# Patient Record
Sex: FEMALE | Race: WHITE | Employment: UNEMPLOYED
[De-identification: names, ages, dates, MRNs, and addresses within clinical notes are randomized per-mention and may not be internally consistent; named-entity substitution may affect disease eponyms.]

---

## 2017-02-13 ENCOUNTER — TELEPHONE (OUTPATIENT)
Dept: FAMILY MEDICINE CLINIC | Facility: CLINIC | Age: 57
End: 2017-02-13

## 2017-02-13 DIAGNOSIS — J45.21 MILD INTERMITTENT ASTHMA WITH ACUTE EXACERBATION: Primary | ICD-10-CM

## 2017-02-13 RX ORDER — ALBUTEROL SULFATE 90 UG/1
2 AEROSOL, METERED RESPIRATORY (INHALATION) EVERY 6 HOURS PRN
Qty: 18 G | Refills: 11 | Status: SHIPPED | OUTPATIENT
Start: 2017-02-13 | End: 2017-08-17 | Stop reason: SDUPTHER

## 2017-02-24 RX ORDER — ALPRAZOLAM 0.5 MG/1
0.5 TABLET ORAL 3 TIMES DAILY PRN
Qty: 60 TABLET | Refills: 3 | Status: SHIPPED | OUTPATIENT
Start: 2017-02-24 | End: 2017-06-20 | Stop reason: SDUPTHER

## 2017-03-20 RX ORDER — ENALAPRIL MALEATE AND HYDROCHLOROTHIAZIDE 10; 25 MG/1; MG/1
1 TABLET ORAL DAILY
Qty: 30 TABLET | Refills: 3 | Status: SHIPPED | OUTPATIENT
Start: 2017-03-20 | End: 2017-07-25 | Stop reason: SDUPTHER

## 2017-03-24 RX ORDER — MONTELUKAST SODIUM 10 MG/1
10 TABLET ORAL NIGHTLY
Qty: 30 TABLET | Refills: 11 | Status: SHIPPED | OUTPATIENT
Start: 2017-03-24 | End: 2017-06-20 | Stop reason: SDUPTHER

## 2017-06-15 ENCOUNTER — OFFICE VISIT (OUTPATIENT)
Dept: PULMONOLOGY | Age: 57
End: 2017-06-15
Payer: COMMERCIAL

## 2017-06-15 VITALS
RESPIRATION RATE: 16 BRPM | HEART RATE: 94 BPM | HEIGHT: 63 IN | TEMPERATURE: 97.7 F | OXYGEN SATURATION: 96 % | BODY MASS INDEX: 29.59 KG/M2 | DIASTOLIC BLOOD PRESSURE: 88 MMHG | SYSTOLIC BLOOD PRESSURE: 144 MMHG | WEIGHT: 167 LBS

## 2017-06-15 DIAGNOSIS — I10 ESSENTIAL HYPERTENSION: ICD-10-CM

## 2017-06-15 DIAGNOSIS — R09.82 POST-NASAL DRIP: ICD-10-CM

## 2017-06-15 DIAGNOSIS — K21.9 GASTROESOPHAGEAL REFLUX DISEASE, ESOPHAGITIS PRESENCE NOT SPECIFIED: ICD-10-CM

## 2017-06-15 DIAGNOSIS — J45.40 MODERATE PERSISTENT ASTHMA WITHOUT COMPLICATION: Primary | Chronic | ICD-10-CM

## 2017-06-15 PROCEDURE — S9441 ASTHMA EDUCATION: HCPCS | Performed by: INTERNAL MEDICINE

## 2017-06-15 PROCEDURE — 99245 OFF/OP CONSLTJ NEW/EST HI 55: CPT | Performed by: INTERNAL MEDICINE

## 2017-06-15 RX ORDER — FLUTICASONE PROPIONATE 50 MCG
2 SPRAY, SUSPENSION (ML) NASAL DAILY
Qty: 1 BOTTLE | Refills: 11 | Status: SHIPPED | OUTPATIENT
Start: 2017-06-15 | End: 2020-11-16

## 2017-06-20 RX ORDER — ALPRAZOLAM 0.5 MG/1
0.5 TABLET ORAL 3 TIMES DAILY PRN
Qty: 60 TABLET | Refills: 3 | Status: SHIPPED | OUTPATIENT
Start: 2017-06-20 | End: 2017-09-26 | Stop reason: SDUPTHER

## 2017-06-20 RX ORDER — ALBUTEROL SULFATE 2.5 MG/3ML
2.5 SOLUTION RESPIRATORY (INHALATION) EVERY 6 HOURS PRN
Qty: 120 EACH | Refills: 5 | Status: SHIPPED | OUTPATIENT
Start: 2017-06-20 | End: 2018-05-07 | Stop reason: SDUPTHER

## 2017-06-20 RX ORDER — MONTELUKAST SODIUM 10 MG/1
10 TABLET ORAL NIGHTLY
Qty: 30 TABLET | Refills: 11 | Status: SHIPPED | OUTPATIENT
Start: 2017-06-20 | End: 2018-07-11 | Stop reason: SDUPTHER

## 2017-06-23 ENCOUNTER — TELEPHONE (OUTPATIENT)
Dept: PULMONOLOGY | Age: 57
End: 2017-06-23

## 2017-06-24 DIAGNOSIS — J45.40 MODERATE PERSISTENT ASTHMA WITHOUT COMPLICATION: Primary | Chronic | ICD-10-CM

## 2017-06-24 RX ORDER — PREDNISONE 10 MG/1
TABLET ORAL
Qty: 30 TABLET | Refills: 1 | Status: SHIPPED | OUTPATIENT
Start: 2017-06-24 | End: 2017-07-11 | Stop reason: ALTCHOICE

## 2017-07-11 ENCOUNTER — OFFICE VISIT (OUTPATIENT)
Dept: FAMILY MEDICINE CLINIC | Age: 57
End: 2017-07-11
Payer: COMMERCIAL

## 2017-07-11 VITALS
HEART RATE: 93 BPM | SYSTOLIC BLOOD PRESSURE: 130 MMHG | DIASTOLIC BLOOD PRESSURE: 82 MMHG | HEIGHT: 64 IN | BODY MASS INDEX: 27.86 KG/M2 | WEIGHT: 163.2 LBS | OXYGEN SATURATION: 98 %

## 2017-07-11 DIAGNOSIS — R07.89 OTHER CHEST PAIN: Primary | ICD-10-CM

## 2017-07-11 PROCEDURE — 93000 ELECTROCARDIOGRAM COMPLETE: CPT | Performed by: FAMILY MEDICINE

## 2017-07-11 PROCEDURE — 99214 OFFICE O/P EST MOD 30 MIN: CPT | Performed by: FAMILY MEDICINE

## 2017-07-11 RX ORDER — NITROGLYCERIN 0.4 MG/1
0.4 TABLET SUBLINGUAL EVERY 5 MIN PRN
Qty: 25 TABLET | Refills: 3 | Status: SHIPPED | OUTPATIENT
Start: 2017-07-11 | End: 2018-11-26 | Stop reason: SDUPTHER

## 2017-07-11 ASSESSMENT — PATIENT HEALTH QUESTIONNAIRE - PHQ9
SUM OF ALL RESPONSES TO PHQ9 QUESTIONS 1 & 2: 0
SUM OF ALL RESPONSES TO PHQ QUESTIONS 1-9: 0
2. FEELING DOWN, DEPRESSED OR HOPELESS: 0
1. LITTLE INTEREST OR PLEASURE IN DOING THINGS: 0

## 2017-07-17 RX ORDER — OMEPRAZOLE 20 MG/1
20 CAPSULE, DELAYED RELEASE ORAL EVERY 12 HOURS
Qty: 90 CAPSULE | Refills: 3 | Status: SHIPPED | OUTPATIENT
Start: 2017-07-17 | End: 2018-01-02 | Stop reason: SDUPTHER

## 2017-07-25 RX ORDER — ENALAPRIL MALEATE AND HYDROCHLOROTHIAZIDE 10; 25 MG/1; MG/1
1 TABLET ORAL DAILY
Qty: 30 TABLET | Refills: 3 | Status: SHIPPED | OUTPATIENT
Start: 2017-07-25 | End: 2017-12-01 | Stop reason: SDUPTHER

## 2017-08-03 DIAGNOSIS — R07.89 OTHER CHEST PAIN: ICD-10-CM

## 2017-08-17 RX ORDER — ALBUTEROL SULFATE 90 UG/1
2 AEROSOL, METERED RESPIRATORY (INHALATION) EVERY 6 HOURS PRN
Qty: 18 G | Refills: 11 | Status: SHIPPED | OUTPATIENT
Start: 2017-08-17 | End: 2017-11-06 | Stop reason: SDUPTHER

## 2017-09-05 ENCOUNTER — TELEPHONE (OUTPATIENT)
Dept: FAMILY MEDICINE CLINIC | Age: 57
End: 2017-09-05

## 2017-09-27 RX ORDER — ALPRAZOLAM 0.5 MG/1
0.5 TABLET ORAL 2 TIMES DAILY PRN
Qty: 60 TABLET | Refills: 0 | Status: SHIPPED | OUTPATIENT
Start: 2017-09-27 | End: 2017-10-26 | Stop reason: SDUPTHER

## 2017-10-26 RX ORDER — ALPRAZOLAM 0.5 MG/1
0.5 TABLET ORAL 2 TIMES DAILY PRN
Qty: 60 TABLET | Refills: 0 | Status: SHIPPED | OUTPATIENT
Start: 2017-10-26 | End: 2017-11-24 | Stop reason: SDUPTHER

## 2017-10-26 NOTE — TELEPHONE ENCOUNTER
From: Christiana Vick  Sent: 10/26/2017 10:40 AM EDT  Subject: Medication Renewal Request    Christiana Vick would like a refill of the following medications:  ALPRAZolam Kurtis Batista) 0.5 MG tablet Shara Padilla MD]    Preferred pharmacy: [facility] [address] [place] [address] [phone] - F [phone]    Comment:

## 2017-10-26 NOTE — TELEPHONE ENCOUNTER
Lv, 07/11/2017  Oarrs, 09/27/2017    Next Visit Date:  Future Appointments  Date Time Provider Nathalia Cornell   12/14/2017 4:00 PM Doron Mueller  [address] Maintenance   Topic Date Due    Hepatitis C screen  1960    HIV screen  07/15/1976    Cervical cancer screen  07/15/1982    Breast cancer screen  07/15/2011    Colon cancer screen colonoscopy  07/15/2011    Flu vaccine (1) 09/01/2017    Lipid screen  09/15/2021    DTaP/Tdap/Td vaccine (2 - Td) 11/24/2025    Pneumococcal med risk  Completed       No results found for: LABA1C          ( goal A1C is < 7)   No results found for: LABMICR  LDL Calculated (mg/dL)   Date Value   09/15/2016 114       (goal LDL is <100)   AST (IU/L)   Date Value   07/03/2017 22     ALT (IU/L)   Date Value   07/03/2017 17     BUN (mg/dL)   Date Value   07/03/2017 14     BP Readings from Last 3 Encounters:   07/11/17 130/82   06/15/17 (!) 144/88   07/26/16 140/90          (goal 120/80)    All Future Testing planned in [facility]  Lab Frequency Next Occurrence               Patient Active Problem List:     Right sided sciatica     Asthma     Hypertension

## 2017-11-06 RX ORDER — ALBUTEROL SULFATE 90 UG/1
2 AEROSOL, METERED RESPIRATORY (INHALATION) EVERY 6 HOURS PRN
Qty: 18 G | Refills: 11 | Status: SHIPPED | OUTPATIENT
Start: 2017-11-06 | End: 2018-12-27 | Stop reason: SDUPTHER

## 2017-11-06 NOTE — TELEPHONE ENCOUNTER
From: Jonathan Capps  Sent: 11/5/2017 9:20 AM EST  Subject: Medication Renewal Request    Jonathan Capps would like a refill of the following medications:  albuterol sulfate HFA (VENTOLIN HFA) 108 (90 Base) MCG/ACT inhaler Yolande Mccormack MD]    Preferred pharmacy: [facility] [address] [phone] - F [phone]    Comment:

## 2017-11-06 NOTE — TELEPHONE ENCOUNTER
Next Visit Date:  Future Appointments  Date Time Provider Nathalia Cornell   12/14/2017 4:00 PM Anny Mina MD Resp Spec [address] Maintenance   Topic Date Due    Hepatitis C screen  1960    HIV screen  07/15/1976    Cervical cancer screen  07/15/1982    Breast cancer screen  07/15/2011    Colon cancer screen colonoscopy  07/15/2011    Lipid screen  09/15/2021    DTaP/Tdap/Td vaccine (2 - Td) 11/24/2025    Flu vaccine  Completed    Pneumococcal med risk  Completed       No results found for: LABA1C          ( goal A1C is < 7)   No results found for: LABMICR  LDL Calculated (mg/dL)   Date Value   09/15/2016 114       (goal LDL is <100)   AST (IU/L)   Date Value   07/03/2017 22     ALT (IU/L)   Date Value   07/03/2017 17     BUN (mg/dL)   Date Value   07/03/2017 14     BP Readings from Last 3 Encounters:   07/11/17 130/82   06/15/17 (!) 144/88   07/26/16 140/90          (goal 120/80)    All Future Testing planned in [facility]  Lab Frequency Next Occurrence               Patient Active Problem List:     Right sided sciatica     Asthma     Hypertension

## 2017-11-17 ENCOUNTER — TELEPHONE (OUTPATIENT)
Dept: PULMONOLOGY | Age: 57
End: 2017-11-17

## 2017-11-24 RX ORDER — ALPRAZOLAM 0.5 MG/1
0.5 TABLET ORAL 2 TIMES DAILY PRN
Qty: 60 TABLET | Refills: 0 | Status: SHIPPED | OUTPATIENT
Start: 2017-11-24 | End: 2017-12-22 | Stop reason: SDUPTHER

## 2017-11-24 NOTE — TELEPHONE ENCOUNTER
From: Renetta Reyes  Sent: 11/24/2017 9:05 AM EST  Subject: Medication Renewal Request    Renetta Reyes would like a refill of the following medications:  ALPRAZolam Apryl Roman) 0.5 MG tablet Prateek Goldman MD]    Preferred pharmacy: [address] [phone] - F [phone]    Comment:

## 2017-11-24 NOTE — TELEPHONE ENCOUNTER
Last refill 10- needs OARRS   Health Maintenance   Topic Date Due    Hepatitis C screen  1960    HIV screen  07/15/1976    Cervical cancer screen  07/15/1982    Breast cancer screen  07/15/2011    Colon cancer screen colonoscopy  07/15/2011    Lipid screen  09/15/2021    DTaP/Tdap/Td vaccine (2 - Td) 11/24/2025    Flu vaccine  Completed    Pneumococcal med risk  Completed       No results found for: LABA1C          ( goal A1C is < 7)   No results found for: LABMICR  LDL Calculated (mg/dL)   Date Value   09/15/2016 114       (goal LDL is <100)   AST (IU/L)   Date Value   07/03/2017 22     ALT (IU/L)   Date Value   07/03/2017 17     BUN (mg/dL)   Date Value   07/03/2017 14     BP Readings from Last 3 Encounters:   07/11/17 130/82   06/15/17 (!) 144/88   07/26/16 140/90          (goal 120/80)    All Future Testing planned in [facility]  Lab Frequency Next Occurrence       Next Visit Date:  Future Appointments  Date Time Provider Nathalia Cornell   12/14/2017 4:00 PM Corinne Fan, MD Resp Spec [address] [facility]            Patient Active Problem List:     Right sided sciatica     Asthma     Hypertension

## 2017-12-01 RX ORDER — ENALAPRIL MALEATE AND HYDROCHLOROTHIAZIDE 10; 25 MG/1; MG/1
1 TABLET ORAL DAILY
Qty: 30 TABLET | Refills: 3 | Status: SHIPPED | OUTPATIENT
Start: 2017-12-01 | End: 2018-04-13 | Stop reason: SDUPTHER

## 2017-12-01 NOTE — TELEPHONE ENCOUNTER
From: Gavino Bull  Sent: 12/1/2017 8:30 AM EST  Subject: Medication Renewal Request    Gavino Bull would like a refill of the following medications:  enalapril-hydrochlorothiazide (VASERETIC) 10-25 MG per tablet Loren Valero MD]    Preferred pharmacy: [address] [phone] - F [phone]    Comment:

## 2017-12-01 NOTE — TELEPHONE ENCOUNTER
Next Visit Date:  Future Appointments  Date Time Provider Nathalia Curriei   12/14/2017 4:00 PM Gareth Millan MD Resp Spec [address] Maintenance   Topic Date Due    Hepatitis C screen  1960    HIV screen  07/15/1976    Cervical cancer screen  07/15/1982    Breast cancer screen  07/15/2011    Colon cancer screen colonoscopy  07/15/2011    Lipid screen  09/15/2021    DTaP/Tdap/Td vaccine (2 - Td) 11/24/2025    Flu vaccine  Completed    Pneumococcal med risk  Completed       No results found for: LABA1C          ( goal A1C is < 7)   No results found for: LABMICR  LDL Calculated (mg/dL)   Date Value   09/15/2016 114       (goal LDL is <100)   AST (IU/L)   Date Value   07/03/2017 22     ALT (IU/L)   Date Value   07/03/2017 17     BUN (mg/dL)   Date Value   07/03/2017 14     BP Readings from Last 3 Encounters:   07/11/17 130/82   06/15/17 (!) 144/88   07/26/16 140/90          (goal 120/80)    All Future Testing planned in [facility]  Lab Frequency Next Occurrence               Patient Active Problem List:     Right sided sciatica     Asthma     Hypertension

## 2017-12-22 NOTE — TELEPHONE ENCOUNTER
From: Macarena Ferrell  Sent: 12/22/2017 5:59 AM EST  Subject: Medication Renewal Request    Macarena Ferrell would like a refill of the following medications:  ALPRAZolam Zainab Raymond) 0.5 MG tablet Konstantin Calles MD]    Preferred pharmacy: [facility] [address] [phone] - F [phone]    Comment:

## 2017-12-26 ENCOUNTER — PATIENT MESSAGE (OUTPATIENT)
Dept: FAMILY MEDICINE CLINIC | Age: 57
End: 2017-12-26

## 2017-12-26 RX ORDER — ALPRAZOLAM 0.5 MG/1
0.5 TABLET ORAL 2 TIMES DAILY PRN
Qty: 60 TABLET | Refills: 0 | Status: SHIPPED | OUTPATIENT
Start: 2017-12-26 | End: 2018-01-24 | Stop reason: SDUPTHER

## 2018-01-02 RX ORDER — OMEPRAZOLE 20 MG/1
20 CAPSULE, DELAYED RELEASE ORAL EVERY 12 HOURS
Qty: 90 CAPSULE | Refills: 3 | Status: SHIPPED | OUTPATIENT
Start: 2018-01-02 | End: 2018-07-11 | Stop reason: SDUPTHER

## 2018-01-24 RX ORDER — ALPRAZOLAM 0.5 MG/1
0.5 TABLET ORAL 2 TIMES DAILY PRN
Qty: 60 TABLET | Refills: 0 | Status: SHIPPED | OUTPATIENT
Start: 2018-01-24 | End: 2018-02-23 | Stop reason: SDUPTHER

## 2018-02-23 RX ORDER — ALPRAZOLAM 0.5 MG/1
0.5 TABLET ORAL 2 TIMES DAILY PRN
Qty: 60 TABLET | Refills: 0 | Status: SHIPPED | OUTPATIENT
Start: 2018-02-23 | End: 2018-03-27 | Stop reason: SDUPTHER

## 2018-03-28 RX ORDER — ALPRAZOLAM 0.5 MG/1
TABLET ORAL
Qty: 60 TABLET | Refills: 0 | Status: SHIPPED | OUTPATIENT
Start: 2018-03-28 | End: 2018-04-26 | Stop reason: SDUPTHER

## 2018-03-28 NOTE — TELEPHONE ENCOUNTER
Last visit: 7-11-17  Oarrs: 2-23-18    Next Visit Date:  Future Appointments  Date Time Provider Nathalia Cornell   4/5/2018 3:45 PM Elif Naranjo  [address] Maintenance   Topic Date Due    Hepatitis C screen  1960    HIV screen  07/15/1976    Cervical cancer screen  07/15/1982    Breast cancer screen  07/15/2011    Shingles Vaccine (1 of 2 - 2 Dose Series) 07/15/2011    Colon cancer screen colonoscopy  07/15/2011    Potassium monitoring  07/03/2018    Creatinine monitoring  07/03/2018    Lipid screen  09/15/2021    DTaP/Tdap/Td vaccine (2 - Td) 11/24/2025    Flu vaccine  Completed    Pneumococcal med risk  Completed       No results found for: LABA1C          ( goal A1C is < 7)   No results found for: LABMICR  LDL Calculated (mg/dL)   Date Value   09/15/2016 114       (goal LDL is <100)   AST (IU/L)   Date Value   07/03/2017 22     ALT (IU/L)   Date Value   07/03/2017 17     BUN (mg/dL)   Date Value   07/03/2017 14     BP Readings from Last 3 Encounters:   07/11/17 130/82   06/15/17 (!) 144/88   07/26/16 140/90          (goal 120/80)    All Future Testing planned in [facility]  Lab Frequency Next Occurrence               Patient Active Problem List:     Right sided sciatica     Asthma     Hypertension

## 2018-04-13 RX ORDER — ENALAPRIL MALEATE AND HYDROCHLOROTHIAZIDE 10; 25 MG/1; MG/1
1 TABLET ORAL DAILY
Qty: 30 TABLET | Refills: 3 | Status: SHIPPED | OUTPATIENT
Start: 2018-04-13 | End: 2018-09-04 | Stop reason: SDUPTHER

## 2018-04-26 RX ORDER — ALPRAZOLAM 0.5 MG/1
0.5 TABLET ORAL NIGHTLY PRN
Qty: 60 TABLET | Refills: 0 | Status: SHIPPED | OUTPATIENT
Start: 2018-04-26 | End: 2018-06-22 | Stop reason: SDUPTHER

## 2018-05-02 ENCOUNTER — OFFICE VISIT (OUTPATIENT)
Dept: FAMILY MEDICINE CLINIC | Age: 58
End: 2018-05-02
Payer: COMMERCIAL

## 2018-05-02 VITALS
HEART RATE: 101 BPM | DIASTOLIC BLOOD PRESSURE: 86 MMHG | HEIGHT: 63 IN | OXYGEN SATURATION: 96 % | BODY MASS INDEX: 28.35 KG/M2 | SYSTOLIC BLOOD PRESSURE: 138 MMHG | WEIGHT: 160 LBS

## 2018-05-02 DIAGNOSIS — Z12.31 ENCOUNTER FOR SCREENING MAMMOGRAM FOR BREAST CANCER: ICD-10-CM

## 2018-05-02 DIAGNOSIS — J45.20 MILD INTERMITTENT ASTHMA WITHOUT COMPLICATION: Primary | ICD-10-CM

## 2018-05-02 DIAGNOSIS — Z12.11 SCREENING FOR COLON CANCER: ICD-10-CM

## 2018-05-02 DIAGNOSIS — Z72.89 OTHER PROBLEMS RELATED TO LIFESTYLE: ICD-10-CM

## 2018-05-02 PROCEDURE — 99214 OFFICE O/P EST MOD 30 MIN: CPT | Performed by: FAMILY MEDICINE

## 2018-05-07 ENCOUNTER — TELEPHONE (OUTPATIENT)
Dept: FAMILY MEDICINE CLINIC | Age: 58
End: 2018-05-07

## 2018-05-07 RX ORDER — ALBUTEROL SULFATE 2.5 MG/3ML
2.5 SOLUTION RESPIRATORY (INHALATION) EVERY 6 HOURS PRN
Qty: 120 EACH | Refills: 5 | Status: SHIPPED | OUTPATIENT
Start: 2018-05-07 | End: 2018-11-12 | Stop reason: SDUPTHER

## 2018-05-07 RX ORDER — PREDNISONE 20 MG/1
TABLET ORAL
Qty: 18 TABLET | Refills: 0 | Status: SHIPPED | OUTPATIENT
Start: 2018-05-07 | End: 2018-05-17

## 2018-05-11 DIAGNOSIS — Z12.11 SCREENING FOR COLON CANCER: ICD-10-CM

## 2018-05-11 LAB
CONTROL: PRESENT
HEMOCCULT STL QL: NEGATIVE

## 2018-05-11 PROCEDURE — 82274 ASSAY TEST FOR BLOOD FECAL: CPT | Performed by: FAMILY MEDICINE

## 2018-06-22 DIAGNOSIS — F41.9 ANXIETY: Primary | ICD-10-CM

## 2018-06-22 RX ORDER — ALPRAZOLAM 0.5 MG/1
TABLET ORAL
Qty: 60 TABLET | Refills: 0 | Status: SHIPPED | OUTPATIENT
Start: 2018-06-22 | End: 2018-08-21 | Stop reason: SDUPTHER

## 2018-06-23 ENCOUNTER — HOSPITAL ENCOUNTER (OUTPATIENT)
Dept: MAMMOGRAPHY | Age: 58
Discharge: HOME | End: 2018-06-25
Payer: COMMERCIAL

## 2018-06-23 DIAGNOSIS — Z12.31 ENCOUNTER FOR SCREENING MAMMOGRAM FOR BREAST CANCER: ICD-10-CM

## 2018-06-23 PROCEDURE — 77067 SCR MAMMO BI INCL CAD: CPT

## 2018-07-09 ENCOUNTER — TELEPHONE (OUTPATIENT)
Dept: FAMILY MEDICINE CLINIC | Age: 58
End: 2018-07-09

## 2018-07-09 RX ORDER — PREDNISONE 20 MG/1
TABLET ORAL
Qty: 20 TABLET | Refills: 0 | Status: SHIPPED | OUTPATIENT
Start: 2018-07-09 | End: 2019-05-07

## 2018-07-09 NOTE — TELEPHONE ENCOUNTER
PT has a flare up of her asthma, would like prednisone bursts to help, coughing more and coughing all night long. She has an appointment with specialist august 8th.

## 2018-07-11 RX ORDER — OMEPRAZOLE 20 MG/1
20 CAPSULE, DELAYED RELEASE ORAL EVERY 12 HOURS
Qty: 90 CAPSULE | Refills: 3 | Status: SHIPPED | OUTPATIENT
Start: 2018-07-11 | End: 2019-01-18 | Stop reason: SDUPTHER

## 2018-07-11 RX ORDER — MONTELUKAST SODIUM 10 MG/1
TABLET ORAL
Qty: 30 TABLET | Refills: 11 | Status: SHIPPED | OUTPATIENT
Start: 2018-07-11 | End: 2019-07-12 | Stop reason: SDUPTHER

## 2018-07-11 NOTE — TELEPHONE ENCOUNTER
Next Visit Date:  No future appointments.     Health Maintenance   Topic Date Due    Hepatitis C screen  1960    HIV screen  07/15/1976    Cervical cancer screen  07/15/1982    Shingles Vaccine (1 of 2 - 2 Dose Series) 07/15/2011    Potassium monitoring  07/03/2018    Creatinine monitoring  07/03/2018    Flu vaccine (1) 09/01/2018    Colon Cancer Screen FIT/FOBT  05/11/2019    Breast cancer screen  06/23/2020    Lipid screen  09/15/2021    DTaP/Tdap/Td vaccine (2 - Td) 11/24/2025    Pneumococcal med risk  Completed       No results found for: LABA1C          ( goal A1C is < 7)   No results found for: LABMICR  LDL Calculated (mg/dL)   Date Value   09/15/2016 114   09/04/2015 100       (goal LDL is <100)   AST (IU/L)   Date Value   07/03/2017 22     ALT (IU/L)   Date Value   07/03/2017 17     BUN (mg/dL)   Date Value   07/03/2017 14     BP Readings from Last 3 Encounters:   05/02/18 138/86   07/11/17 130/82   06/15/17 (!) 144/88          (goal 120/80)    All Future Testing planned in [facility]  Lab Frequency Next Occurrence   HIV Screen Once 06/01/2018   Hepatitis C Antibody Once 06/01/2018               Patient Active Problem List:     Right sided sciatica     Asthma     Hypertension

## 2018-08-21 DIAGNOSIS — F41.9 ANXIETY: ICD-10-CM

## 2018-08-21 RX ORDER — ALPRAZOLAM 0.5 MG/1
TABLET ORAL
Qty: 60 TABLET | Refills: 0 | Status: SHIPPED | OUTPATIENT
Start: 2018-08-21 | End: 2018-10-22 | Stop reason: SDUPTHER

## 2018-08-21 NOTE — TELEPHONE ENCOUNTER
Please review OARRS  Last visit:5/2/18  Last Med refill:6/22/18    Next Visit Date:  No future appointments.     Health Maintenance   Topic Date Due    Hepatitis C screen  1960    HIV screen  07/15/1976    Cervical cancer screen  07/15/1982    Shingles Vaccine (1 of 2 - 2 Dose Series) 07/15/2011    Potassium monitoring  07/03/2018    Creatinine monitoring  07/03/2018    Flu vaccine (1) 09/01/2018    Colon Cancer Screen FIT/FOBT  05/11/2019    Breast cancer screen  06/23/2020    Lipid screen  09/15/2021    DTaP/Tdap/Td vaccine (2 - Td) 11/24/2025    Pneumococcal med risk  Completed       No results found for: LABA1C          ( goal A1C is < 7)   No results found for: LABMICR  LDL Calculated (mg/dL)   Date Value   09/15/2016 114   09/04/2015 100       (goal LDL is <100)   AST (IU/L)   Date Value   07/03/2017 22     ALT (IU/L)   Date Value   07/03/2017 17     BUN (mg/dL)   Date Value   07/03/2017 14     BP Readings from Last 3 Encounters:   05/02/18 138/86   07/11/17 130/82   06/15/17 (!) 144/88          (goal 120/80)    All Future Testing planned in [facility]  Lab Frequency Next Occurrence   HIV Screen Once 06/01/2018   Hepatitis C Antibody Once 06/01/2018               Patient Active Problem List:     Right sided sciatica     Asthma     Hypertension

## 2018-09-04 RX ORDER — ENALAPRIL MALEATE AND HYDROCHLOROTHIAZIDE 10; 25 MG/1; MG/1
1 TABLET ORAL DAILY
Qty: 30 TABLET | Refills: 3 | Status: SHIPPED | OUTPATIENT
Start: 2018-09-04 | End: 2019-01-18 | Stop reason: SDUPTHER

## 2018-09-04 NOTE — TELEPHONE ENCOUNTER
From: Khurram Triplett  Sent: 9/3/2018 3:37 PM EDT  Subject: Medication Renewal Request    Khurram Triplett would like a refill of the following medications:     enalapril-hydrochlorothiazide (VASERETIC) 10-25 MG per tablet Yesy Angeles MD]    Preferred pharmacy: [facility] [address] [place] [address] [phone]

## 2018-10-22 DIAGNOSIS — F41.9 ANXIETY: ICD-10-CM

## 2018-10-22 RX ORDER — ALPRAZOLAM 0.5 MG/1
0.5 TABLET ORAL NIGHTLY PRN
Qty: 60 TABLET | Refills: 0 | Status: SHIPPED | OUTPATIENT
Start: 2018-10-22 | End: 2018-12-21 | Stop reason: SDUPTHER

## 2018-11-12 RX ORDER — ALBUTEROL SULFATE 2.5 MG/3ML
2.5 SOLUTION RESPIRATORY (INHALATION) EVERY 6 HOURS PRN
Qty: 120 EACH | Refills: 5 | Status: SHIPPED | OUTPATIENT
Start: 2018-11-12 | End: 2019-07-23 | Stop reason: SDUPTHER

## 2018-11-12 NOTE — TELEPHONE ENCOUNTER
From: Malik Oas  Sent: 11/11/2018 2:13 PM EST  Subject: Medication Renewal Request    Malik Oas would like a refill of the following medications:     albuterol (PROVENTIL) (2.5 MG/3ML) 0.083% nebulizer solution Florin Malik MD]    Preferred pharmacy: [facility] [address] [place] [address] [address] - F [phone]

## 2018-11-26 RX ORDER — NITROGLYCERIN 0.4 MG/1
0.4 TABLET SUBLINGUAL EVERY 5 MIN PRN
Qty: 25 TABLET | Refills: 3 | Status: SHIPPED | OUTPATIENT
Start: 2018-11-26 | End: 2020-11-16

## 2018-12-21 DIAGNOSIS — F41.9 ANXIETY: ICD-10-CM

## 2018-12-21 RX ORDER — ALPRAZOLAM 0.5 MG/1
0.5 TABLET ORAL NIGHTLY PRN
Qty: 60 TABLET | Refills: 0 | Status: SHIPPED | OUTPATIENT
Start: 2018-12-21 | End: 2019-02-18 | Stop reason: SDUPTHER

## 2018-12-27 RX ORDER — ALBUTEROL SULFATE 90 UG/1
2 AEROSOL, METERED RESPIRATORY (INHALATION) EVERY 6 HOURS PRN
Qty: 18 G | Refills: 5 | Status: SHIPPED | OUTPATIENT
Start: 2018-12-27 | End: 2019-06-13 | Stop reason: SDUPTHER

## 2019-01-18 RX ORDER — ENALAPRIL MALEATE AND HYDROCHLOROTHIAZIDE 10; 25 MG/1; MG/1
1 TABLET ORAL DAILY
Qty: 30 TABLET | Refills: 3 | Status: SHIPPED | OUTPATIENT
Start: 2019-01-18 | End: 2019-05-31 | Stop reason: SDUPTHER

## 2019-01-18 RX ORDER — OMEPRAZOLE 20 MG/1
20 CAPSULE, DELAYED RELEASE ORAL EVERY 12 HOURS
Qty: 90 CAPSULE | Refills: 3 | Status: SHIPPED | OUTPATIENT
Start: 2019-01-18 | End: 2019-08-07 | Stop reason: SDUPTHER

## 2019-02-18 DIAGNOSIS — F41.9 ANXIETY: ICD-10-CM

## 2019-02-18 RX ORDER — ALPRAZOLAM 0.5 MG/1
TABLET ORAL
Qty: 60 TABLET | Refills: 0 | Status: SHIPPED | OUTPATIENT
Start: 2019-02-18 | End: 2019-04-16 | Stop reason: SDUPTHER

## 2019-02-26 ENCOUNTER — OFFICE VISIT (OUTPATIENT)
Dept: FAMILY MEDICINE CLINIC | Age: 59
End: 2019-02-26
Payer: COMMERCIAL

## 2019-02-26 VITALS
WEIGHT: 170 LBS | DIASTOLIC BLOOD PRESSURE: 78 MMHG | BODY MASS INDEX: 30.12 KG/M2 | SYSTOLIC BLOOD PRESSURE: 140 MMHG | HEIGHT: 63 IN

## 2019-02-26 DIAGNOSIS — I10 ESSENTIAL HYPERTENSION: Primary | ICD-10-CM

## 2019-02-26 DIAGNOSIS — J45.909 MODERATE ASTHMA WITHOUT COMPLICATION, UNSPECIFIED WHETHER PERSISTENT: ICD-10-CM

## 2019-02-26 PROCEDURE — 99214 OFFICE O/P EST MOD 30 MIN: CPT | Performed by: FAMILY MEDICINE

## 2019-02-26 ASSESSMENT — PATIENT HEALTH QUESTIONNAIRE - PHQ9
SUM OF ALL RESPONSES TO PHQ QUESTIONS 1-9: 0
SUM OF ALL RESPONSES TO PHQ9 QUESTIONS 1 & 2: 0
2. FEELING DOWN, DEPRESSED OR HOPELESS: 0
SUM OF ALL RESPONSES TO PHQ QUESTIONS 1-9: 0
1. LITTLE INTEREST OR PLEASURE IN DOING THINGS: 0

## 2019-05-07 ENCOUNTER — OFFICE VISIT (OUTPATIENT)
Dept: FAMILY MEDICINE CLINIC | Age: 59
End: 2019-05-07
Payer: COMMERCIAL

## 2019-05-07 VITALS — OXYGEN SATURATION: 95 % | DIASTOLIC BLOOD PRESSURE: 86 MMHG | HEART RATE: 100 BPM | SYSTOLIC BLOOD PRESSURE: 136 MMHG

## 2019-05-07 DIAGNOSIS — J45.909 MODERATE ASTHMA WITHOUT COMPLICATION, UNSPECIFIED WHETHER PERSISTENT: Primary | ICD-10-CM

## 2019-05-07 DIAGNOSIS — I10 ESSENTIAL HYPERTENSION: ICD-10-CM

## 2019-05-07 PROCEDURE — 99214 OFFICE O/P EST MOD 30 MIN: CPT | Performed by: FAMILY MEDICINE

## 2019-05-07 RX ORDER — PREDNISONE 10 MG/1
TABLET ORAL DAILY
Refills: 0 | COMMUNITY
Start: 2019-05-01 | End: 2019-10-11 | Stop reason: ALTCHOICE

## 2019-05-07 NOTE — PROGRESS NOTES
Subjective:  Michel Garza presents for   Chief Complaint   Patient presents with   [address] from [facility] for asthma. still on prednisone. x-ray and labs are done. Was in the hosp on 2 days. Was dc'd 7 days ago. Fluids are good    Tapered odwn on prednisone in 2 days. feels back to normal now, but uon further questioning she is not to her baseline. Patient Active Problem List   Diagnosis    Right sided sciatica    Asthma    Hypertension       Review of Systems:  · General: no significant weight changes. · Respiratory: no cough, pleuritic chest pain, dyspnea, or wheezing  · Cardiovascular: no pain, MCGRATH, orthopnea, palpitations, or claudication  · Gastrointestinal: no chronic nausea, vomiting, heartburn, diarrhea, constipation, bloating, or abdominal pain. No bloody or black stools. Objective:  Physical Exam   Vitals:   Vitals:    05/07/19 0943   BP: 136/86   Pulse: 100   SpO2: 95%     Wt Readings from Last 3 Encounters:   02/26/19 170 lb (77.1 kg)   05/02/18 160 lb (72.6 kg)   07/11/17 163 lb 3.2 oz (74 kg)     Ht Readings from Last 3 Encounters:   02/26/19 5' 2.99\" (1.6 m)   05/02/18 5' 3\" (1.6 m)   07/11/17 5' 3.75\" (1.619 m)     There is no height or weight on file to calculate BMI. Constitutional: She is oriented to person, place, and time. She appears well-developed and well-nourished and in no acute distress. Answers all my questions appropriately. Head: Normocephalic and atraumatic. Eyes:conjunctiva appear normal.  Right Ear: External ear normal. TM is clear  Left Ear: External ear normal. TM is clear  Nose: pink, non-edematous mucosa. No polyps. No septal deviation  Throat: no erythema, tonsillar hypertrophy or exudate. No ulcerations noted. Lips/Teeth/Gums all appear normal.  Neck: Normal range of motion. Neck supple. No tracheal deviation present. No abnormal lymphadenopathy. No JVD noted. Carotids are clear bilaterally. No thyroid masses noted.   Heart: RRR without murmur. No S3, S4, or gallop noted. Chest: decreasedbreath sounds noted. With forced expriation   No respiratory retractions noted. Wall has symmetrical movement with respirations. Assessment:   Encounter Diagnoses   Name Primary?  Moderate asthma without complication, unspecified whether persistent Yes    Essential hypertension          Plan:   Medications Discontinued During This Encounter   Medication Reason    aspirin 81 MG tablet     predniSONE (DELTASONE) 20 MG tablet     tiotropium (SPIRIVA RESPIMAT) 1.25 MCG/ACT AERS inhaler      THE ABOVE NOTED DISCONTINUED MEDS MAY ONLY BE FROM 'CLEANING UP' THE MED LIST AND WERE NOT ACTUALLY CANCELED;  SEE CHART FOR DETAILS! No orders of the defined types were placed in this encounter. No orders of the defined types were placed in this encounter. No follow-ups on file. There are no Patient Instructions on file for this visit.   Data Unavailable      FU with the pulmonaolgist    Avoid allergens

## 2019-05-07 NOTE — PROGRESS NOTES
Visit Information    Have you changed or started any medications since your last visit including any over-the-counter medicines, vitamins, or herbal medicines? no   Have you stopped taking any of your medications? Is so, why? -  no  Are you having any side effects from any of your medications? - no    Have you seen any other physician or provider since your last visit? yes -    Have you had any other diagnostic tests since your last visit?  no   Have you been seen in the emergency room and/or had an admission in a hospital since we last saw you?  yes -    Have you had your routine dental cleaning in the past 6 months? Do you have an active MyChart account? If no, what is the barrier?   Yes    Patient Care Team:  Joon Carrasco MD as PCP - Laura Flores MD as PCP - S Attributed Provider  Vita Russell MD (Pulmonology)  Dale Liu MD as Consulting Physician (Pulmonology)    Medical History Review  Past Medical, Family, and Social History reviewed and  contribute to the patient presenting condition    Health Maintenance   Topic Date Due    Hepatitis C screen  1960    HIV screen  07/15/1976    Cervical cancer screen  07/15/1982    Diabetes screen  07/15/2001    Shingles Vaccine (1 of 2) 07/15/2011    Colon Cancer Screen FIT/FOBT  05/11/2019    Potassium monitoring  05/01/2020    Creatinine monitoring  05/01/2020    Breast cancer screen  06/23/2020    Lipid screen  09/15/2021    DTaP/Tdap/Td vaccine (2 - Td) 11/24/2025    Flu vaccine  Completed    Pneumococcal 0-64 years Vaccine  Completed

## 2019-05-09 ENCOUNTER — TELEPHONE (OUTPATIENT)
Dept: FAMILY MEDICINE CLINIC | Age: 59
End: 2019-05-09

## 2019-05-09 RX ORDER — DOXYCYCLINE 100 MG/1
100 CAPSULE ORAL 2 TIMES DAILY WITH MEALS
Qty: 20 CAPSULE | Refills: 0 | Status: SHIPPED | OUTPATIENT
Start: 2019-05-09 | End: 2019-10-11 | Stop reason: ALTCHOICE

## 2019-05-09 RX ORDER — PREDNISONE 20 MG/1
TABLET ORAL
Qty: 18 TABLET | Refills: 0 | Status: SHIPPED | OUTPATIENT
Start: 2019-05-09 | End: 2019-05-19

## 2019-05-09 NOTE — TELEPHONE ENCOUNTER
Patient said she was supposed to call you today. She saw you on the 7th and she still has hoarse voice, cough, bronchial spasms, inflammation is still there.   [facility]

## 2019-06-03 RX ORDER — ENALAPRIL MALEATE AND HYDROCHLOROTHIAZIDE 10; 25 MG/1; MG/1
TABLET ORAL
Qty: 30 TABLET | Refills: 5 | Status: SHIPPED | OUTPATIENT
Start: 2019-06-03 | End: 2019-12-20

## 2019-06-04 ENCOUNTER — TELEPHONE (OUTPATIENT)
Dept: FAMILY MEDICINE CLINIC | Age: 59
End: 2019-06-04

## 2019-06-04 RX ORDER — PREDNISONE 20 MG/1
TABLET ORAL
Qty: 18 TABLET | Refills: 0 | Status: SHIPPED | OUTPATIENT
Start: 2019-06-04 | End: 2021-03-25 | Stop reason: SDUPTHER

## 2019-06-04 NOTE — TELEPHONE ENCOUNTER
Patient called and said she is having a asthma flare up and would like to know if you can send some prednisone in or do you want to see her?  It started 2 days ago

## 2019-06-13 DIAGNOSIS — F41.9 ANXIETY: ICD-10-CM

## 2019-06-13 NOTE — TELEPHONE ENCOUNTER
OARRS reviewed 4/16/19  Last visit: 5/7/19  Last Med refill: 4/17/19  Does patient have enough medication for 72 hours:     Next Visit Date:  Future Appointments   Date Time Provider Nathalia Aneta   8/26/2019  3:40 PM Doris Huddleston  [address] [facility] Maintenance   Topic Date Due    Hepatitis C screen  1960    HIV screen  07/15/1976    Cervical cancer screen  07/15/1982    Diabetes screen  07/15/2001    Shingles Vaccine (1 of 2) 07/15/2011    Colon Cancer Screen FIT/FOBT  05/11/2019    Potassium monitoring  05/01/2020    Creatinine monitoring  05/01/2020    Breast cancer screen  06/23/2020    Lipid screen  09/15/2021    DTaP/Tdap/Td vaccine (2 - Td) 11/24/2025    Flu vaccine  Completed    Pneumococcal 0-64 years Vaccine  Completed       No results found for: LABA1C          ( goal A1C is < 7)   No results found for: LABMICR  LDL Calculated (mg/dL)   Date Value   09/15/2016 114   09/04/2015 100       (goal LDL is <100)   AST (IU/L)   Date Value   07/03/2017 22     ALT (IU/L)   Date Value   07/03/2017 17     BUN (mg/dL)   Date Value   05/01/2019 11     BP Readings from Last 3 Encounters:   05/07/19 136/86   02/26/19 (!) 140/78   05/02/18 138/86          (goal 120/80)    All Future Testing planned in [facility]  Lab Frequency Next Occurrence   CBC Auto Differential Once 02/26/2019   Comprehensive Metabolic Panel Once 64/31/1480   Lipid Panel Once 02/26/2019   TSH with Reflex Once 02/26/2019               Patient Active Problem List:     Right sided sciatica     Asthma     Hypertension

## 2019-06-14 RX ORDER — ALPRAZOLAM 0.5 MG/1
TABLET ORAL
Qty: 60 TABLET | Refills: 0 | Status: SHIPPED | OUTPATIENT
Start: 2019-06-14 | End: 2019-08-12 | Stop reason: SDUPTHER

## 2019-06-17 ENCOUNTER — TELEPHONE (OUTPATIENT)
Dept: FAMILY MEDICINE CLINIC | Age: 59
End: 2019-06-17

## 2019-07-02 ENCOUNTER — OFFICE VISIT (OUTPATIENT)
Dept: FAMILY MEDICINE CLINIC | Age: 59
End: 2019-07-02
Payer: COMMERCIAL

## 2019-07-02 ENCOUNTER — HOSPITAL ENCOUNTER (OUTPATIENT)
Age: 59
Setting detail: SPECIMEN
Discharge: HOME | End: 2019-07-02
Payer: COMMERCIAL

## 2019-07-02 VITALS — HEART RATE: 122 BPM | DIASTOLIC BLOOD PRESSURE: 84 MMHG | SYSTOLIC BLOOD PRESSURE: 146 MMHG | OXYGEN SATURATION: 95 %

## 2019-07-02 DIAGNOSIS — R53.83 FATIGUE, UNSPECIFIED TYPE: ICD-10-CM

## 2019-07-02 DIAGNOSIS — K57.92 DIVERTICULITIS: Primary | ICD-10-CM

## 2019-07-02 DIAGNOSIS — K57.92 DIVERTICULITIS: ICD-10-CM

## 2019-07-02 LAB
ABSOLUTE EOS #: 0.69 K/UL (ref 0–0.44)
ABSOLUTE IMMATURE GRANULOCYTE: 0.19 K/UL (ref 0–0.3)
ABSOLUTE LYMPH #: 2.67 K/UL (ref 1.1–3.7)
ABSOLUTE MONO #: 1.47 K/UL (ref 0.1–1.2)
ALBUMIN SERPL-MCNC: 4.5 G/DL (ref 3.5–5.2)
ALBUMIN/GLOBULIN RATIO: 1.6 (ref 1–2.5)
ALP BLD-CCNC: 75 U/L (ref 35–104)
ALT SERPL-CCNC: 23 U/L (ref 5–33)
ANION GAP SERPL CALCULATED.3IONS-SCNC: 22 MMOL/L (ref 9–17)
AST SERPL-CCNC: 22 U/L
BASOPHILS # BLD: 1 % (ref 0–2)
BASOPHILS ABSOLUTE: 0.16 K/UL (ref 0–0.2)
BILIRUB SERPL-MCNC: 0.39 MG/DL (ref 0.3–1.2)
BUN BLDV-MCNC: 10 MG/DL (ref 6–20)
BUN/CREAT BLD: ABNORMAL (ref 9–20)
CALCIUM SERPL-MCNC: 9.8 MG/DL (ref 8.6–10.4)
CHLORIDE BLD-SCNC: 86 MMOL/L (ref 98–107)
CO2: 20 MMOL/L (ref 20–31)
CREAT SERPL-MCNC: 0.54 MG/DL (ref 0.5–0.9)
DIFFERENTIAL TYPE: ABNORMAL
EOSINOPHILS RELATIVE PERCENT: 4 % (ref 1–4)
GFR AFRICAN AMERICAN: >60 ML/MIN
GFR NON-AFRICAN AMERICAN: >60 ML/MIN
GFR SERPL CREATININE-BSD FRML MDRD: ABNORMAL ML/MIN/{1.73_M2}
GFR SERPL CREATININE-BSD FRML MDRD: ABNORMAL ML/MIN/{1.73_M2}
GLUCOSE BLD-MCNC: 78 MG/DL (ref 70–99)
HCT VFR BLD CALC: 44.3 % (ref 36.3–47.1)
HEMOGLOBIN: 15.2 G/DL (ref 11.9–15.1)
IMMATURE GRANULOCYTES: 1 %
LYMPHOCYTES # BLD: 14 % (ref 24–43)
MCH RBC QN AUTO: 31.3 PG (ref 25.2–33.5)
MCHC RBC AUTO-ENTMCNC: 34.3 G/DL (ref 28.4–34.8)
MCV RBC AUTO: 91.3 FL (ref 82.6–102.9)
MONOCYTES # BLD: 8 % (ref 3–12)
NRBC AUTOMATED: 0 PER 100 WBC
PDW BLD-RTO: 12.2 % (ref 11.8–14.4)
PLATELET # BLD: 514 K/UL (ref 138–453)
PLATELET ESTIMATE: ABNORMAL
PMV BLD AUTO: 9 FL (ref 8.1–13.5)
POTASSIUM SERPL-SCNC: 4.1 MMOL/L (ref 3.7–5.3)
RBC # BLD: 4.85 M/UL (ref 3.95–5.11)
RBC # BLD: ABNORMAL 10*6/UL
SEG NEUTROPHILS: 72 % (ref 36–65)
SEGMENTED NEUTROPHILS ABSOLUTE COUNT: 14.48 K/UL (ref 1.5–8.1)
SODIUM BLD-SCNC: 128 MMOL/L (ref 135–144)
TOTAL PROTEIN: 7.3 G/DL (ref 6.4–8.3)
TSH SERPL DL<=0.05 MIU/L-ACNC: 1.74 MIU/L (ref 0.3–5)
WBC # BLD: 19.7 K/UL (ref 3.5–11.3)
WBC # BLD: ABNORMAL 10*3/UL

## 2019-07-02 PROCEDURE — 99213 OFFICE O/P EST LOW 20 MIN: CPT | Performed by: FAMILY MEDICINE

## 2019-07-02 RX ORDER — ONDANSETRON 4 MG/1
TABLET, ORALLY DISINTEGRATING ORAL 2 TIMES DAILY PRN
Refills: 0 | COMMUNITY
Start: 2019-06-17 | End: 2021-01-11

## 2019-07-02 NOTE — PROGRESS NOTES
Subjective:  [facility] presents for   Chief Complaint   Patient presents with    Abdominal Pain     6/17/19 went to [facility] urgent care for abdominal pain. STAT CT was done and was dx with sigmoid diverticulitis.  Nausea & Vomiting     still having sx. on liquid diet. drinking lots of water.  Bloated     abdominal swelling left. Was given cipro and is done with that. The pharmacy did not give the flagly until recentl    No fevers. No blood in stools    Is feeling poorly overall, [facility] consider applying for SS disbility    Patient Active Problem List   Diagnosis    Right sided sciatica    Asthma    Hypertension       Review of Systems:  · General: no significant weight changes. · Respiratory: no cough, pleuritic chest pain, dyspnea, or wheezing  · Cardiovascular: no pain, MCGRATH, orthopnea, palpitations, or claudication  · Gastrointestinal: no chronic nausea, vomiting, heartburn, diarrhea, constipation, bloating, or abdominal pain. No bloody or black stools. Objective:  Physical Exam   Vitals:   Vitals:    07/02/19 0822   BP: (!) 150/100   Pulse: 122   SpO2: 95%     Wt Readings from Last 3 Encounters:   02/26/19 170 lb (77.1 kg)   05/02/18 160 lb (72.6 kg)   07/11/17 163 lb 3.2 oz (74 kg)     Ht Readings from Last 3 Encounters:   02/26/19 5' 2.99\" (1.6 m)   05/02/18 5' 3\" (1.6 m)   07/11/17 5' 3.75\" (1.619 m)     There is no height or weight on file to calculate BMI. Constitutional: She is oriented to person, place, and time. She appears well-developed and well-nourished and in no acute distress. Answers all my questions appropriately. Head: Normocephalic and atraumatic. Eyes:conjunctiva appear normal.  Neck: Normal range of motion. Neck supple. No tracheal deviation present. No abnormal lymphadenopathy. No JVD noted. Carotids are clear bilaterally. No thyroid masses noted. Heart: RRR without murmur. No S3, S4, or gallop noted. Chest: Clear to auscultation bilaterally.   Good breath sounds noted. No rales, wheezes, or rhonchi noted. No respiratory retractions noted. Wall has symmetrical movement with respirations. Abdomen: No distension noted.  + bowel sounds in all quadrants which are normoactive. No bruits noted. No masses could be palpated. No unusual pulsatile masses noted. To deep palpation, patient denied any significant pain. No rebound, guarding or rigidity noted to my exam.          Assessment:   Encounter Diagnoses   Name Primary?  Diverticulitis Yes    Fatigue, unspecified type          Plan:   There are no discontinued medications. THE ABOVE NOTED DISCONTINUED MEDS MAY ONLY BE FROM 'CLEANING UP' THE MED LIST AND WERE NOT ACTUALLY CANCELED;  SEE CHART FOR DETAILS! No orders of the defined types were placed in this encounter. Orders Placed This Encounter   Procedures    CBC Auto Differential     Standing Status:   Future     Standing Expiration Date:   7/2/2020    Comprehensive Metabolic Panel     Standing Status:   Future     Standing Expiration Date:   7/2/2020    TSH With Reflex Ft4     Standing Status:   Future     Standing Expiration Date:   7/2/2020    Amb External Referral To Gastroenterology     Referral Priority:   Urgent     Referral Reason:   Specialty Services Required     Referred to Provider:   Tram Dunbar DO     Requested Specialty:   Gastroenterology     Number of Visits Requested:   1     No follow-ups on file. There are no Patient Instructions on file for this visit.   Data Unavailable      Push fluids

## 2019-07-03 ENCOUNTER — HOSPITAL ENCOUNTER (OUTPATIENT)
Dept: CT IMAGING | Facility: CLINIC | Age: 59
Discharge: HOME | End: 2019-07-05
Payer: COMMERCIAL

## 2019-07-03 DIAGNOSIS — K57.92 DIVERTICULITIS: ICD-10-CM

## 2019-07-03 DIAGNOSIS — K57.92 DIVERTICULITIS: Primary | ICD-10-CM

## 2019-07-03 DIAGNOSIS — N32.89 BLADDER WALL THICKENING: ICD-10-CM

## 2019-07-03 DIAGNOSIS — E87.1 LOW SODIUM LEVELS: Primary | ICD-10-CM

## 2019-07-03 PROCEDURE — 74176 CT ABD & PELVIS W/O CONTRAST: CPT

## 2019-07-08 ENCOUNTER — HOSPITAL ENCOUNTER (OUTPATIENT)
Age: 59
Setting detail: SPECIMEN
Discharge: HOME | End: 2019-07-08
Payer: COMMERCIAL

## 2019-07-08 DIAGNOSIS — N32.89 BLADDER WALL THICKENING: ICD-10-CM

## 2019-07-08 DIAGNOSIS — E87.1 LOW SODIUM LEVELS: ICD-10-CM

## 2019-07-08 LAB
BILIRUBIN URINE: NEGATIVE
COLOR: YELLOW
COMMENT UA: NORMAL
GLUCOSE URINE: NEGATIVE
KETONES, URINE: NEGATIVE
LEUKOCYTE ESTERASE, URINE: NEGATIVE
NITRITE, URINE: NEGATIVE
PH UA: 5.5 (ref 5–8)
PROTEIN UA: NEGATIVE
SPECIFIC GRAVITY UA: 1.02 (ref 1–1.03)
TURBIDITY: CLEAR
URINE HGB: NEGATIVE
UROBILINOGEN, URINE: NORMAL

## 2019-07-09 LAB
ANION GAP SERPL CALCULATED.3IONS-SCNC: 12 MMOL/L (ref 9–17)
CHLORIDE BLD-SCNC: 101 MMOL/L (ref 98–107)
CO2: 27 MMOL/L (ref 20–31)
CULTURE: NORMAL
Lab: NORMAL
POTASSIUM SERPL-SCNC: 4 MMOL/L (ref 3.7–5.3)
SODIUM BLD-SCNC: 140 MMOL/L (ref 135–144)
SPECIMEN DESCRIPTION: NORMAL

## 2019-07-12 ENCOUNTER — TELEPHONE (OUTPATIENT)
Dept: FAMILY MEDICINE CLINIC | Age: 59
End: 2019-07-12

## 2019-07-12 RX ORDER — IPRATROPIUM BROMIDE AND ALBUTEROL SULFATE 2.5; .5 MG/3ML; MG/3ML
1 SOLUTION RESPIRATORY (INHALATION) EVERY 4 HOURS
Qty: 60 VIAL | Refills: 11 | Status: SHIPPED | OUTPATIENT
Start: 2019-07-12 | End: 2020-09-15

## 2019-07-12 RX ORDER — MONTELUKAST SODIUM 10 MG/1
TABLET ORAL
Qty: 30 TABLET | Refills: 11 | Status: SHIPPED | OUTPATIENT
Start: 2019-07-12 | End: 2020-07-27

## 2019-07-23 RX ORDER — ALBUTEROL SULFATE 2.5 MG/3ML
SOLUTION RESPIRATORY (INHALATION)
Qty: 300 ML | Refills: 5 | Status: SHIPPED | OUTPATIENT
Start: 2019-07-23 | End: 2020-11-16

## 2019-08-08 RX ORDER — OMEPRAZOLE 20 MG/1
20 CAPSULE, DELAYED RELEASE ORAL EVERY 12 HOURS
Qty: 90 CAPSULE | Refills: 3 | Status: SHIPPED | OUTPATIENT
Start: 2019-08-08 | End: 2020-08-13

## 2019-09-01 ENCOUNTER — PATIENT MESSAGE (OUTPATIENT)
Dept: FAMILY MEDICINE CLINIC | Age: 59
End: 2019-09-01

## 2019-09-03 ENCOUNTER — OFFICE VISIT (OUTPATIENT)
Dept: FAMILY MEDICINE CLINIC | Age: 59
End: 2019-09-03
Payer: COMMERCIAL

## 2019-09-03 VITALS
BODY MASS INDEX: 28.35 KG/M2 | OXYGEN SATURATION: 93 % | SYSTOLIC BLOOD PRESSURE: 150 MMHG | DIASTOLIC BLOOD PRESSURE: 90 MMHG | HEART RATE: 112 BPM | WEIGHT: 160 LBS

## 2019-09-03 DIAGNOSIS — R06.02 SOB (SHORTNESS OF BREATH): Primary | ICD-10-CM

## 2019-09-03 DIAGNOSIS — J45.909 MODERATE ASTHMA WITHOUT COMPLICATION, UNSPECIFIED WHETHER PERSISTENT: ICD-10-CM

## 2019-09-03 DIAGNOSIS — J90 PLEURAL EFFUSION, LEFT: ICD-10-CM

## 2019-09-03 PROCEDURE — 99214 OFFICE O/P EST MOD 30 MIN: CPT | Performed by: FAMILY MEDICINE

## 2019-09-03 RX ORDER — DEXTROMETHORPHAN HYDROBROMIDE AND PROMETHAZINE HYDROCHLORIDE 15; 6.25 MG/5ML; MG/5ML
SOLUTION ORAL EVERY 4 HOURS PRN
Refills: 0 | COMMUNITY
Start: 2019-08-16 | End: 2019-10-11 | Stop reason: ALTCHOICE

## 2019-09-03 RX ORDER — AZITHROMYCIN 500 MG/1
500 TABLET, FILM COATED ORAL DAILY
COMMUNITY
Start: 2019-09-02 | End: 2019-09-06

## 2019-09-03 NOTE — PROGRESS NOTES
tracheal deviation present. No abnormal lymphadenopathy. No JVD noted. Carotids are clear bilaterally. No thyroid masses noted. Heart: RRR without murmur. No S3, S4, or gallop noted. Chest: good breath sounds noted, except in left base. No rales, wheezes, or rhonchi noted. No respiratory retractions noted. Wall has symmetrical movement with respirations. Assessment:   Encounter Diagnoses   Name Primary?  SOB (shortness of breath) Yes    Moderate asthma without complication, unspecified whether persistent     Pleural effusion, left          Plan:   There are no discontinued medications. THE ABOVE NOTED DISCONTINUED MEDS MAY ONLY BE FROM 'CLEANING UP' THE MED LIST AND WERE NOT ACTUALLY CANCELED;  SEE CHART FOR DETAILS! No orders of the defined types were placed in this encounter. Orders Placed This Encounter   Procedures    CT CHEST WO CONTRAST     Standing Status:   Future     Standing Expiration Date:   9/3/2020   [address] Jagdish Siddiqui DO, Pulmonology, [place]     Referral Priority:   Routine     Referral Type:   Eval and Treat     Referral Reason:   Specialty Services Required     Referred to Provider:   Traci Blank DO     Requested Specialty:   Pulmonology     Number of Visits Requested:   1     Return in about 3 months (around 12/3/2019). There are no Patient Instructions on file for this visit.   Data Unavailable      She is allergic to contrast.

## 2019-09-09 ENCOUNTER — HOSPITAL ENCOUNTER (OUTPATIENT)
Dept: CT IMAGING | Facility: CLINIC | Age: 59
Discharge: HOME | End: 2019-09-11
Payer: COMMERCIAL

## 2019-09-09 DIAGNOSIS — J90 PLEURAL EFFUSION, LEFT: ICD-10-CM

## 2019-09-09 DIAGNOSIS — R06.02 SOB (SHORTNESS OF BREATH): ICD-10-CM

## 2019-09-09 PROCEDURE — 71250 CT THORAX DX C-: CPT

## 2019-09-10 RX ORDER — DOXYCYCLINE 100 MG/1
100 CAPSULE ORAL 2 TIMES DAILY WITH MEALS
Qty: 20 CAPSULE | Refills: 0 | Status: SHIPPED | OUTPATIENT
Start: 2019-09-10 | End: 2019-10-11 | Stop reason: ALTCHOICE

## 2019-09-11 ENCOUNTER — TELEPHONE (OUTPATIENT)
Dept: FAMILY MEDICINE CLINIC | Age: 59
End: 2019-09-11

## 2019-10-10 DIAGNOSIS — F41.9 ANXIETY: ICD-10-CM

## 2019-10-10 DIAGNOSIS — Z87.898 HISTORY OF BENZODIAZEPINE USE: Primary | ICD-10-CM

## 2019-10-10 RX ORDER — ALPRAZOLAM 0.5 MG/1
TABLET ORAL
Qty: 30 TABLET | Refills: 0 | Status: SHIPPED | OUTPATIENT
Start: 2019-10-10 | End: 2019-10-16 | Stop reason: ALTCHOICE

## 2019-10-11 ENCOUNTER — HOSPITAL ENCOUNTER (OUTPATIENT)
Age: 59
Setting detail: SPECIMEN
Discharge: HOME | End: 2019-10-11
Payer: COMMERCIAL

## 2019-10-11 ENCOUNTER — OFFICE VISIT (OUTPATIENT)
Dept: PULMONOLOGY | Age: 59
End: 2019-10-11
Payer: COMMERCIAL

## 2019-10-11 VITALS
HEIGHT: 64 IN | RESPIRATION RATE: 16 BRPM | BODY MASS INDEX: 28.34 KG/M2 | OXYGEN SATURATION: 96 % | DIASTOLIC BLOOD PRESSURE: 89 MMHG | HEART RATE: 122 BPM | SYSTOLIC BLOOD PRESSURE: 138 MMHG | WEIGHT: 166 LBS

## 2019-10-11 DIAGNOSIS — J67.9 HYPERSENSITIVITY PNEUMONITIS (HCC): ICD-10-CM

## 2019-10-11 DIAGNOSIS — J45.909 ASTHMA, UNSPECIFIED ASTHMA SEVERITY, UNSPECIFIED WHETHER COMPLICATED, UNSPECIFIED WHETHER PERSISTENT: Primary | ICD-10-CM

## 2019-10-11 DIAGNOSIS — I10 ESSENTIAL HYPERTENSION: ICD-10-CM

## 2019-10-11 DIAGNOSIS — J68.3 REACTIVE AIRWAYS DYSFUNCTION SYNDROME (HCC): ICD-10-CM

## 2019-10-11 DIAGNOSIS — E66.01 CLASS 2 SEVERE OBESITY DUE TO EXCESS CALORIES WITH SERIOUS COMORBIDITY IN ADULT, UNSPECIFIED BMI (HCC): ICD-10-CM

## 2019-10-11 DIAGNOSIS — F41.9 ANXIETY: ICD-10-CM

## 2019-10-11 DIAGNOSIS — J90 PLEURAL EFFUSION: ICD-10-CM

## 2019-10-11 DIAGNOSIS — Z87.898 HISTORY OF BENZODIAZEPINE USE: ICD-10-CM

## 2019-10-11 DIAGNOSIS — J82.89 PNEUMONIA ALLERGIC (HCC): ICD-10-CM

## 2019-10-11 LAB
AMPHETAMINE SCREEN URINE: NEGATIVE
BARBITURATE SCREEN URINE: NEGATIVE
BENZODIAZEPINE SCREEN, URINE: NEGATIVE
BUPRENORPHINE URINE: NORMAL
CANNABINOID SCREEN URINE: NEGATIVE
COCAINE METABOLITE, URINE: NEGATIVE
MDMA URINE: NORMAL
METHADONE SCREEN, URINE: NEGATIVE
METHAMPHETAMINE, URINE: NORMAL
OPIATES, URINE: NEGATIVE
OXYCODONE SCREEN URINE: NEGATIVE
PHENCYCLIDINE, URINE: NEGATIVE
PROPOXYPHENE, URINE: NORMAL
TEST INFORMATION: NORMAL
TRICYCLIC ANTIDEPRESSANTS, UR: NORMAL

## 2019-10-11 PROCEDURE — 99214 OFFICE O/P EST MOD 30 MIN: CPT | Performed by: INTERNAL MEDICINE

## 2019-10-11 PROCEDURE — 94726 PLETHYSMOGRAPHY LUNG VOLUMES: CPT | Performed by: INTERNAL MEDICINE

## 2019-10-11 PROCEDURE — 94729 DIFFUSING CAPACITY: CPT | Performed by: INTERNAL MEDICINE

## 2019-10-11 PROCEDURE — 94010 BREATHING CAPACITY TEST: CPT | Performed by: INTERNAL MEDICINE

## 2019-10-11 RX ORDER — PREDNISONE 20 MG/1
40 TABLET ORAL DAILY
Qty: 60 TABLET | Refills: 3 | Status: SHIPPED | OUTPATIENT
Start: 2019-10-11 | End: 2019-11-10

## 2019-10-16 PROBLEM — R89.2 ABNORMAL DRUG SCREEN: Chronic | Status: ACTIVE | Noted: 2019-10-16

## 2019-12-06 ENCOUNTER — HOSPITAL ENCOUNTER (OUTPATIENT)
Age: 59
Discharge: HOME | End: 2019-12-06
Payer: COMMERCIAL

## 2019-12-06 ENCOUNTER — OFFICE VISIT (OUTPATIENT)
Dept: PULMONOLOGY | Age: 59
End: 2019-12-06
Payer: COMMERCIAL

## 2019-12-06 VITALS
RESPIRATION RATE: 14 BRPM | HEIGHT: 64 IN | DIASTOLIC BLOOD PRESSURE: 74 MMHG | OXYGEN SATURATION: 98 % | WEIGHT: 168 LBS | BODY MASS INDEX: 28.68 KG/M2 | SYSTOLIC BLOOD PRESSURE: 116 MMHG | HEART RATE: 91 BPM

## 2019-12-06 DIAGNOSIS — E66.01 CLASS 2 SEVERE OBESITY DUE TO EXCESS CALORIES WITH SERIOUS COMORBIDITY IN ADULT, UNSPECIFIED BMI (HCC): ICD-10-CM

## 2019-12-06 DIAGNOSIS — J45.31 MILD PERSISTENT ASTHMA WITH ACUTE EXACERBATION: ICD-10-CM

## 2019-12-06 DIAGNOSIS — J45.909 ASTHMA, UNSPECIFIED ASTHMA SEVERITY, UNSPECIFIED WHETHER COMPLICATED, UNSPECIFIED WHETHER PERSISTENT: ICD-10-CM

## 2019-12-06 DIAGNOSIS — J90 PLEURAL EFFUSION: ICD-10-CM

## 2019-12-06 DIAGNOSIS — I10 ESSENTIAL HYPERTENSION: Primary | ICD-10-CM

## 2019-12-06 LAB
ABSOLUTE EOS #: 0.38 K/UL (ref 0–0.44)
ABSOLUTE IMMATURE GRANULOCYTE: 0.04 K/UL (ref 0–0.3)
ABSOLUTE LYMPH #: 2.1 K/UL (ref 1.1–3.7)
ABSOLUTE MONO #: 0.82 K/UL (ref 0.1–1.2)
BASOPHILS # BLD: 1 % (ref 0–2)
BASOPHILS ABSOLUTE: 0.08 K/UL (ref 0–0.2)
DIFFERENTIAL TYPE: ABNORMAL
EOSINOPHILS RELATIVE PERCENT: 4 % (ref 1–4)
HCT VFR BLD CALC: 42 % (ref 36.3–47.1)
HEMOGLOBIN: 14.2 G/DL (ref 11.9–15.1)
IMMATURE GRANULOCYTES: 1 %
LYMPHOCYTES # BLD: 24 % (ref 24–43)
MCH RBC QN AUTO: 30.5 PG (ref 25.2–33.5)
MCHC RBC AUTO-ENTMCNC: 33.8 G/DL (ref 28.4–34.8)
MCV RBC AUTO: 90.1 FL (ref 82.6–102.9)
MONOCYTES # BLD: 9 % (ref 3–12)
NRBC AUTOMATED: 0 PER 100 WBC
PDW BLD-RTO: 15.7 % (ref 11.8–14.4)
PLATELET # BLD: 375 K/UL (ref 138–453)
PLATELET ESTIMATE: ABNORMAL
PMV BLD AUTO: 8.6 FL (ref 8.1–13.5)
RBC # BLD: 4.66 M/UL (ref 3.95–5.11)
RBC # BLD: ABNORMAL 10*6/UL
SEG NEUTROPHILS: 61 % (ref 36–65)
SEGMENTED NEUTROPHILS ABSOLUTE COUNT: 5.38 K/UL (ref 1.5–8.1)
WBC # BLD: 8.8 K/UL (ref 3.5–11.3)
WBC # BLD: ABNORMAL 10*3/UL

## 2019-12-06 PROCEDURE — 99214 OFFICE O/P EST MOD 30 MIN: CPT | Performed by: INTERNAL MEDICINE

## 2019-12-06 PROCEDURE — 85025 COMPLETE CBC W/AUTO DIFF WBC: CPT

## 2019-12-06 PROCEDURE — 86003 ALLG SPEC IGE CRUDE XTRC EA: CPT

## 2019-12-06 PROCEDURE — 36415 COLL VENOUS BLD VENIPUNCTURE: CPT

## 2019-12-06 RX ORDER — ALBUTEROL SULFATE 90 UG/1
2 AEROSOL, METERED RESPIRATORY (INHALATION) EVERY 6 HOURS PRN
Qty: 1 INHALER | Refills: 3 | Status: SHIPPED | OUTPATIENT
Start: 2019-12-06 | End: 2020-04-07

## 2019-12-09 LAB — P. NOTATUM: <0.34 KU/L (ref 0–0.34)

## 2019-12-17 RX ORDER — PREDNISONE 20 MG/1
TABLET ORAL
Refills: 0 | COMMUNITY
Start: 2019-12-06 | End: 2020-05-29

## 2019-12-20 RX ORDER — ENALAPRIL MALEATE AND HYDROCHLOROTHIAZIDE 10; 25 MG/1; MG/1
TABLET ORAL
Qty: 30 TABLET | Refills: 5 | Status: SHIPPED | OUTPATIENT
Start: 2019-12-20 | End: 2020-06-19 | Stop reason: SDUPTHER

## 2019-12-26 ENCOUNTER — TELEPHONE (OUTPATIENT)
Dept: PULMONOLOGY | Age: 59
End: 2019-12-26

## 2019-12-26 NOTE — TELEPHONE ENCOUNTER
The patient called and stated that she was taking prednisone and is still coughing and wheezing no color phlegm and SOB please advise

## 2020-03-24 RX ORDER — ALBUTEROL SULFATE 2.5 MG/3ML
2.5 SOLUTION RESPIRATORY (INHALATION) EVERY 6 HOURS PRN
Qty: 120 EACH | Refills: 5 | Status: ON HOLD | OUTPATIENT
Start: 2020-03-24 | End: 2021-03-31 | Stop reason: HOSPADM

## 2020-04-07 RX ORDER — PREDNISONE 10 MG/1
10 TABLET ORAL DAILY
Qty: 30 TABLET | Refills: 1 | Status: SHIPPED | OUTPATIENT
Start: 2020-04-07 | End: 2020-05-07

## 2020-04-07 RX ORDER — PREDNISONE 20 MG/1
TABLET ORAL
Qty: 60 TABLET | Refills: 3 | OUTPATIENT
Start: 2020-04-07

## 2020-04-07 RX ORDER — PREDNISONE 20 MG/1
TABLET ORAL
Qty: 35 TABLET | Refills: 0 | Status: SHIPPED | OUTPATIENT
Start: 2020-04-07 | End: 2020-10-12 | Stop reason: SDUPTHER

## 2020-04-07 NOTE — TELEPHONE ENCOUNTER
Dr Rossi Tripathi, patient is current and due in for an appointment on 6/19/20. Per last dictation:     patient clinically is better. She will continue the bronchodilators every 4 including steroids. Please sign for refill if ok. Thank you.

## 2020-05-29 ENCOUNTER — TELEMEDICINE (OUTPATIENT)
Dept: PULMONOLOGY | Age: 60
End: 2020-05-29

## 2020-05-29 VITALS — BODY MASS INDEX: 27.14 KG/M2 | TEMPERATURE: 97.8 F | WEIGHT: 159 LBS | HEIGHT: 64 IN

## 2020-05-29 PROCEDURE — 99214 OFFICE O/P EST MOD 30 MIN: CPT | Performed by: INTERNAL MEDICINE

## 2020-05-29 RX ORDER — PREDNISONE 20 MG/1
40 TABLET ORAL DAILY
Qty: 20 TABLET | Refills: 0 | Status: SHIPPED | OUTPATIENT
Start: 2020-05-29 | End: 2020-06-08

## 2020-05-29 RX ORDER — M-VIT,TX,IRON,MINS/CALC/FOLIC 27MG-0.4MG
1 TABLET ORAL DAILY
COMMUNITY

## 2020-05-29 NOTE — PROGRESS NOTES
CNP   fluticasone-salmeterol (ADVAIR) 500-50 MCG/DOSE diskus inhaler INHALE 1 PUFF INTO THE LUNGS EVERY 12 HOURS Yes Danica Snow MD   enalapril-hydrochlorothiazide (VASERETIC) 10-25 MG per tablet take 1 tablet by mouth once daily Yes Danica Snow MD   omeprazole (PRILOSEC) 20 MG delayed release capsule take 1 capsule by mouth every 12 hours Yes Danica Snow MD   albuterol (PROVENTIL) (2.5 MG/3ML) 0.083% nebulizer solution inhale contents of 1 vial in nebulizer every 6 hours if needed for wheezing or shortness of breath Yes Danica Snow MD   montelukast (SINGULAIR) 10 MG tablet take 1 tablet by mouth every evening Yes Danica Snow MD   ipratropium-albuterol (DUONEB) 0.5-2.5 (3) MG/3ML SOLN nebulizer solution Inhale 3 mLs into the lungs every 4 hours Yes Danica Snow MD   ondansetron (ZOFRAN-ODT) 4 MG disintegrating tablet Take by mouth 2 times daily as needed Yes Historical Provider, MD   VENTOLIN  (90 Base) MCG/ACT inhaler inhale 2 puffs by mouth every 6 hours if needed for wheezing Yes Danica Snow MD   nitroGLYCERIN (NITROSTAT) 0.4 MG SL tablet Place 1 tablet under the tongue every 5 minutes as needed for Chest pain Yes Danica Snow MD   acetaminophen (APAP EXTRA STRENGTH) 500 MG tablet Take 500 mg by mouth every 6 hours as needed for Pain. Yes Historical Provider, MD   Loratadine (CLARITIN) 10 MG CAPS Take  by mouth.  Yes Historical Provider, MD   Nebulizers (COMPRESSOR/NEBULIZER) MISC 1 each by Does not apply route 4 times daily for 14 days Use with medications as directed  Danica Snow MD   fluticasone (FLONASE) 50 MCG/ACT nasal spray 2 sprays by Nasal route daily  Tri Vargas MD       Social History     Tobacco Use    Smoking status: Former Smoker     Packs/day: 0.25     Years: 0.50     Pack years: 0.12     Last attempt to quit: 2011     Years since quittin.4    Smokeless tobacco: Never Used   Substance Use Topics    Alcohol use: No    Drug use: No            RECORD REVIEW: Previous medical records were reviewed at today's visit. Wt Readings from Last 3 Encounters:   05/29/20 159 lb (72.1 kg)   12/06/19 168 lb (76.2 kg)   10/11/19 166 lb (75.3 kg)       Results for orders placed or performed during the hospital encounter of 12/06/19   Allergen Fungi & Molds Penicillium Notat   Result Value Ref Range    P. Notatum <0.34 0.00 - 0.34 kU/L   CBC Auto Differential   Result Value Ref Range    WBC 8.8 3.5 - 11.3 k/uL    RBC 4.66 3.95 - 5.11 m/uL    Hemoglobin 14.2 11.9 - 15.1 g/dL    Hematocrit 42.0 36.3 - 47.1 %    MCV 90.1 82.6 - 102.9 fL    MCH 30.5 25.2 - 33.5 pg    MCHC 33.8 28.4 - 34.8 g/dL    RDW 15.7 (H) 11.8 - 14.4 %    Platelets 948 036 - 439 k/uL    MPV 8.6 8.1 - 13.5 fL    NRBC Automated 0.0 0.0 per 100 WBC    Differential Type NOT REPORTED     Seg Neutrophils 61 36 - 65 %    Lymphocytes 24 24 - 43 %    Monocytes 9 3 - 12 %    Eosinophils % 4 1 - 4 %    Basophils 1 0 - 2 %    Immature Granulocytes 1 (H) 0 %    Segs Absolute 5.38 1.50 - 8.10 k/uL    Absolute Lymph # 2.10 1.10 - 3.70 k/uL    Absolute Mono # 0.82 0.10 - 1.20 k/uL    Absolute Eos # 0.38 0.00 - 0.44 k/uL    Basophils Absolute 0.08 0.00 - 0.20 k/uL    Absolute Immature Granulocyte 0.04 0.00 - 0.30 k/uL    WBC Morphology NOT REPORTED     RBC Morphology ANISOCYTOSIS PRESENT     Platelet Estimate NOT REPORTED        No results found. PHYSICAL EXAMINATION:  Due to this being a TeleHealth encounter, evaluation of the following organ systems is limited: Vitals/Constitutional/EENT/Resp/CV/GI//MS/Neuro/Skin/Heme-Lymph-Imm. Constitutional: [x] Appears well-developed and well-nourished.      [x] Abnormal obesity mental status  [x] Alert and awake  [x] Oriented to person/place/time [x]Able to follow commands    [x] No apparent distress      Eyes:  EOM    [x]  Normal  [] Abnormal-  Sclera  [x]  Normal  [] Abnormal -         Discharge [x]  None visible  [] Abnormal -    HENT:   [x] Normocephalic, atraumatic. [] Abnormal shaped head   [x] Mouth/Throat: Mucous membranes are moist.     Ears [x] Normal  [] Abnormal-    Neck: [x] Normal range of motion [x] Supple [x] No visualized mass. Pulmonary/Chest: [x] Respiratory effort normal.  [x] No visualized signs of difficulty breathing or respiratory distress not using any accessory muscles      [] Abnormal      Musculoskeletal:   [x] Normal range of motion. [x] Normal gait with no signs of ataxia. [x]  No signs of cyanosis of the peripheral portions of extremities. [] Abnormal       Neurological:        [x] Normal cranial nerve (limited exam to video visit) [x] No focal weakness observed       [] Abnormal          Speech       [x] Normal   [] Abnormal     Skin:        [x] No rash on visible skin  [x] Normal  [] Abnormal     Psychiatric:       [x] Normal  [] Abnormal        [x] Normal Mood  [] Anxious appearing        Other Pertinent Exam Findings:       ASSESSMENT:  Bronchial asthma moderate persistent. Systemic hypertension   #3 obesity    Plan:     Patient advised to continue present bronchodilator therapy including increasing including inhaled steroid long-acting beta agonist.    She will continue the use of Singulair. He uses a short acting beta agonist on as-needed basis. A prescription for prednisone was called in for the patient and advised to keep it with her and use it for 5 days 40 mg daily in case she gets exacerbation of her asthma. Continues antihistaminics on as-needed basis. Continue her efforts to lose weight    Her blood pressure is under good control and she will continue same treatment. She already had Pneumovax    She has been influenza vaccine. She is not a candidate for lung cancer screening    Dictated by Dr. Quinten Coffman MD dictation over thank you  1. An  electronic signature was used to authenticate this note.     --Valentin Rivera MD on 5/29/2020 at 1:49 PM    9}    Pursuant to the emergency declaration under the [address] [place] [address], [facility] waiver authority and the [employer] Act, this Virtual  Visit was conducted, with patient's consent, to reduce the patient's risk of exposure to COVID-19 and provide continuity of care for an established patient.     Services were provided through a video synchronous discussion virtually to substitute for in-person clinic visit.     _____________________________________________________________________________________________

## 2020-06-09 RX ORDER — ALBUTEROL SULFATE 90 UG/1
AEROSOL, METERED RESPIRATORY (INHALATION)
Qty: 18 G | Refills: 5 | Status: SHIPPED | OUTPATIENT
Start: 2020-06-09 | End: 2021-01-04 | Stop reason: SDUPTHER

## 2020-06-15 ENCOUNTER — TELEPHONE (OUTPATIENT)
Dept: PULMONOLOGY | Age: 60
End: 2020-06-15

## 2020-06-15 RX ORDER — PREDNISONE 20 MG/1
20 TABLET ORAL DAILY
Qty: 5 TABLET | Refills: 0 | Status: SHIPPED | OUTPATIENT
Start: 2020-06-15 | End: 2020-06-20

## 2020-06-15 NOTE — TELEPHONE ENCOUNTER
Original message deleted in error -   Message was sent to Dr Dinesh Chaparro this morning, he was asked to review. Pt is requesting 3rd Rx for prednisone. She is still feeling SOB, WHEEZING. Vitals are normal.     Toby Felipe - will you please review and refill if you feel appropriate.

## 2020-06-18 ENCOUNTER — TELEMEDICINE (OUTPATIENT)
Dept: PULMONOLOGY | Age: 60
End: 2020-06-18
Payer: COMMERCIAL

## 2020-06-18 PROCEDURE — 99214 OFFICE O/P EST MOD 30 MIN: CPT | Performed by: INTERNAL MEDICINE

## 2020-06-18 NOTE — PROGRESS NOTES
EDUARDO Wright CNP   albuterol (PROVENTIL) (2.5 MG/3ML) 0.083% nebulizer solution Take 3 mLs by nebulization every 6 hours as needed for Wheezing Yes EDUARDO Church CNP   fluticasone-salmeterol (ADVAIR) 500-50 MCG/DOSE diskus inhaler INHALE 1 PUFF INTO THE LUNGS EVERY 12 HOURS Yes Italo Thompson MD   enalapril-hydrochlorothiazide (VASERETIC) 10-25 MG per tablet take 1 tablet by mouth once daily Yes Italo Thompson MD   omeprazole (PRILOSEC) 20 MG delayed release capsule take 1 capsule by mouth every 12 hours Yes Italo Thompson MD   albuterol (PROVENTIL) (2.5 MG/3ML) 0.083% nebulizer solution inhale contents of 1 vial in nebulizer every 6 hours if needed for wheezing or shortness of breath Yes Italo Thompson MD   montelukast (SINGULAIR) 10 MG tablet take 1 tablet by mouth every evening Yes Italo Thompson MD   ipratropium-albuterol (DUONEB) 0.5-2.5 (3) MG/3ML SOLN nebulizer solution Inhale 3 mLs into the lungs every 4 hours Yes Italo Thompson MD   ondansetron (ZOFRAN-ODT) 4 MG disintegrating tablet Take by mouth 2 times daily as needed Yes Historical Provider, MD   nitroGLYCERIN (NITROSTAT) 0.4 MG SL tablet Place 1 tablet under the tongue every 5 minutes as needed for Chest pain Yes Italo Thompson MD   acetaminophen (APAP EXTRA STRENGTH) 500 MG tablet Take 500 mg by mouth every 6 hours as needed for Pain. Yes Historical Provider, MD   Loratadine (CLARITIN) 10 MG CAPS Take  by mouth.  Yes Historical Provider, MD   Nebulizers (COMPRESSOR/NEBULIZER) MISC 1 each by Does not apply route 4 times daily for 14 days Use with medications as directed  Italo Thompson MD   fluticasone (FLONASE) 50 MCG/ACT nasal spray 2 sprays by Nasal route daily  Sandra Lama MD       Social History     Tobacco Use    Smoking status: Former Smoker     Packs/day: 0.25     Years: 0.50     Pack years: 0.12     Last attempt to quit: 2012     Years since quittin.5    Smokeless tobacco: Never Used   Substance Use Abnormal -    HENT:   [x] Normocephalic, atraumatic. [] Abnormal shaped head   [x] Mouth/Throat: Mucous membranes are moist.     Ears [x] Normal  [] Abnormal-    Neck: [x] Normal range of motion [x] Supple [x] No visualized mass. Pulmonary/Chest: [x] Respiratory effort normal.  [x] No visualized signs of difficulty breathing or respiratory distress        [] Abnormal      Musculoskeletal:   [x] Normal range of motion. [x] Normal gait with no signs of ataxia. [x]  No signs of cyanosis of the peripheral portions of extremities. [] Abnormal       Neurological:        [x] Normal cranial nerve (limited exam to video visit) [x] No focal weakness observed       [] Abnormal          Speech       [x] Normal   [] Abnormal     Skin:        [x] No rash on visible skin  [x] Normal  [] Abnormal     Psychiatric:       [x] Normal  [] Abnormal        [x] Normal Mood  [] Anxious appearing        Other Pertinent Exam Findings:       ASSESSMENT:  Bronchial asthma    Obesity    Hypertension  Nasopharyngeal allergies   plan:   1. Patient has been doing very well I advised her to continue the same treatment as before. 2.   3. This includes long-acting beta agonist and short-acting beta agonist inhaled steroids and Singulair. She does have very good control of her asthma and so I did not feel the need to give any anti-eosinophilic medication. 4.   5. He was encouraged to lose weight  6.   7. She already had Pneumovax  8.   9. She already had flu vaccine. Blood pressure is under good control. 10.   11. No acute exacerbation is asthma. 12.   13. She is not a candidate for lung cancer screening  14.   15. She does have prednisone at home and she can use it if he gets acute exacerbation and at that time she will let her office know also. 12.   17. Dictated by Dr. Tyler Malave MD dictation over thank you  18. An  electronic signature was used to authenticate this note.     --Renny George MD on 6/18/2020 at 11:39

## 2020-06-19 ENCOUNTER — PATIENT MESSAGE (OUTPATIENT)
Dept: FAMILY MEDICINE CLINIC | Age: 60
End: 2020-06-19

## 2020-06-19 RX ORDER — ENALAPRIL MALEATE AND HYDROCHLOROTHIAZIDE 10; 25 MG/1; MG/1
1 TABLET ORAL DAILY
Qty: 30 TABLET | Refills: 0 | Status: SHIPPED | OUTPATIENT
Start: 2020-06-19 | End: 2020-07-18 | Stop reason: SDUPTHER

## 2020-07-20 RX ORDER — ENALAPRIL MALEATE AND HYDROCHLOROTHIAZIDE 10; 25 MG/1; MG/1
1 TABLET ORAL DAILY
Qty: 30 TABLET | Refills: 0 | Status: SHIPPED | OUTPATIENT
Start: 2020-07-20 | End: 2020-08-21

## 2020-07-27 RX ORDER — MONTELUKAST SODIUM 10 MG/1
TABLET ORAL
Qty: 30 TABLET | Refills: 11 | Status: SHIPPED | OUTPATIENT
Start: 2020-07-27 | End: 2020-11-02 | Stop reason: SDUPTHER

## 2020-07-28 ENCOUNTER — TELEPHONE (OUTPATIENT)
Dept: PULMONOLOGY | Age: 60
End: 2020-07-28

## 2020-07-28 NOTE — TELEPHONE ENCOUNTER
Pt complaining of Asthma exacerbation x 2 wks - she is using rescue inhaler and Nebulizer. Finished a 20mg dose of Prednisone, however she didn't take AD and has no relief. Pt questioning if she needs higher dose?         pt last seen 6/18-  Per your note:   A prescription for prednisone was called in for the patient and advised to keep it with her and use it for 5 days 40 mg daily in case she gets exacerbation of her asthma.

## 2020-08-03 RX ORDER — PREDNISONE 10 MG/1
TABLET ORAL
Qty: 40 TABLET | Refills: 0 | OUTPATIENT
Start: 2020-08-03 | End: 2021-02-15

## 2020-08-13 RX ORDER — OMEPRAZOLE 20 MG/1
20 CAPSULE, DELAYED RELEASE ORAL EVERY 12 HOURS
Qty: 360 CAPSULE | Refills: 0 | Status: SHIPPED | OUTPATIENT
Start: 2020-08-13 | End: 2020-12-03 | Stop reason: SDUPTHER

## 2020-08-21 ENCOUNTER — TELEPHONE (OUTPATIENT)
Dept: FAMILY MEDICINE CLINIC | Age: 60
End: 2020-08-21

## 2020-08-21 RX ORDER — ENALAPRIL MALEATE AND HYDROCHLOROTHIAZIDE 10; 25 MG/1; MG/1
TABLET ORAL
Qty: 30 TABLET | Refills: 0 | Status: SHIPPED | OUTPATIENT
Start: 2020-08-21 | End: 2020-09-21

## 2020-08-21 NOTE — TELEPHONE ENCOUNTER
Ramakrishna Wills from [facility] is requesting medical records for patient. Would like a call back @ [phone]. Fax [phone].  Thanks    Last O/V 12/17/19

## 2020-08-24 ENCOUNTER — TELEMEDICINE (OUTPATIENT)
Dept: PULMONOLOGY | Age: 60
End: 2020-08-24
Payer: COMMERCIAL

## 2020-08-24 PROCEDURE — 99214 OFFICE O/P EST MOD 30 MIN: CPT | Performed by: INTERNAL MEDICINE

## 2020-08-24 RX ORDER — FLUTICASONE PROPIONATE 50 MCG
1 SPRAY, SUSPENSION (ML) NASAL DAILY
COMMUNITY

## 2020-08-24 RX ORDER — PREDNISONE 20 MG/1
40 TABLET ORAL DAILY
Qty: 20 TABLET | Refills: 0 | Status: SHIPPED | OUTPATIENT
Start: 2020-08-24 | End: 2020-09-03

## 2020-08-24 ASSESSMENT — SLEEP AND FATIGUE QUESTIONNAIRES
HOW LIKELY ARE YOU TO NOD OFF OR FALL ASLEEP WHILE WATCHING TV: 0
HOW LIKELY ARE YOU TO NOD OFF OR FALL ASLEEP WHILE SITTING QUIETLY AFTER LUNCH WITHOUT ALCOHOL: 0
HOW LIKELY ARE YOU TO NOD OFF OR FALL ASLEEP IN A CAR, WHILE STOPPED FOR A FEW MINUTES IN TRAFFIC: 0
HOW LIKELY ARE YOU TO NOD OFF OR FALL ASLEEP WHILE SITTING INACTIVE IN A PUBLIC PLACE: 0
HOW LIKELY ARE YOU TO NOD OFF OR FALL ASLEEP WHILE LYING DOWN TO REST IN THE AFTERNOON WHEN CIRCUMSTANCES PERMIT: 0
HOW LIKELY ARE YOU TO NOD OFF OR FALL ASLEEP WHEN YOU ARE A PASSENGER IN A CAR FOR AN HOUR WITHOUT A BREAK: 0
HOW LIKELY ARE YOU TO NOD OFF OR FALL ASLEEP WHILE SITTING AND READING: 0
HOW LIKELY ARE YOU TO NOD OFF OR FALL ASLEEP WHILE SITTING AND TALKING TO SOMEONE: 0
ESS TOTAL SCORE: 0

## 2020-08-24 NOTE — PROGRESS NOTES
inhaler inhale 2 puffs by mouth every 6 hours if needed for wheezing Yes Jenna Suazo MD   Multiple Vitamins-Minerals (THERAPEUTIC MULTIVITAMIN-MINERALS) tablet Take 1 tablet by mouth daily Yes Historical Provider, MD   predniSONE (DELTASONE) 20 MG tablet Take 30mgs (1 1/2 tabs) daily for two weeks, then 20mgs (1 tab) daily for two weeks then proceed to different script for 10mgs daily until seen in office Yes EDUARDO Walker CNP   albuterol (PROVENTIL) (2.5 MG/3ML) 0.083% nebulizer solution Take 3 mLs by nebulization every 6 hours as needed for Wheezing Yes EDUARDO Walker CNP   fluticasone-salmeterol (ADVAIR) 500-50 MCG/DOSE diskus inhaler INHALE 1 PUFF INTO THE LUNGS EVERY 12 HOURS Yes Vick Frederick MD   albuterol (PROVENTIL) (2.5 MG/3ML) 0.083% nebulizer solution inhale contents of 1 vial in nebulizer every 6 hours if needed for wheezing or shortness of breath Yes Vick Frederick MD   ipratropium-albuterol (DUONEB) 0.5-2.5 (3) MG/3ML SOLN nebulizer solution Inhale 3 mLs into the lungs every 4 hours Yes Vick Frederick MD   acetaminophen (APAP EXTRA STRENGTH) 500 MG tablet Take 500 mg by mouth every 6 hours as needed for Pain. Yes Historical Provider, MD   Loratadine (CLARITIN) 10 MG CAPS Take  by mouth.  Yes Historical Provider, MD   ondansetron (ZOFRAN-ODT) 4 MG disintegrating tablet Take by mouth 2 times daily as needed  Historical Provider, MD   Nebulizers (COMPRESSOR/NEBULIZER) MISC 1 each by Does not apply route 4 times daily for 14 days Use with medications as directed  Vick Frederick MD   nitroGLYCERIN (NITROSTAT) 0.4 MG SL tablet Place 1 tablet under the tongue every 5 minutes as needed for Chest pain  Patient not taking: Reported on 8/24/2020  Vick Frederick MD   fluticasone (FLONASE) 50 MCG/ACT nasal spray 2 sprays by Nasal route daily  Catalina Gutierrez MD       Social History     Tobacco Use    Smoking status: Former Smoker     Packs/day: 0.25     Years: 0.50     Pack years: 0.12     Last attempt to quit: 2012     Years since quittin.6    Smokeless tobacco: Never Used   Substance Use Topics    Alcohol use: No    Drug use: No            RECORD REVIEW: Previous medical records were reviewed at today's visit.     Wt Readings from Last 3 Encounters:   20 159 lb (72.1 kg)   19 168 lb (76.2 kg)   10/11/19 166 lb (75.3 kg)       Results for orders placed or performed in visit on 20   CBC with Differential   Result Value Ref Range    WBC 9.61 4.00 - 10.60 10*3/uL    RBC 4.49 3.80 - 5.00 10*6/uL    Hemoglobin 14.3 12.0 - 15.0 g/dL    Hematocrit 39.4 36.0 - 45.0 %    MCV 87.8 82.0 - 98.0 fL    MCH 31.8 27.0 - 33.0 pg    MCHC 36.3 (H) 32.0 - 35.0 g/dL    RDW 12.2 11.5 - 15.0 %    Platelets 151 202 - 737 10*3/uL    Neutrophils % 50.8 40.0 - 72.0 %    Immature Granulocytes 0.3 0.0 - 1.0 %    Lymphocytes % 24.7 20.0 - 45.0 %    Monocytes % 12.1 (H) 5.0 - 12.0 %    Eosinophils % 10.9 (H) 0.0 - 6.0 %    Basophils % 1.2 (H) 0.0 - 1.0 %    Neutrophils Absolute 4.9 1.6 - 7.6 10*3/uL    Absolute Immature Granulocyte 0.0 0.0 - 0.2 10*3/uL    Lymphocytes Absolute 2.4 1.2 - 4.0 10*3/uL    Monocytes Absolute 1.2 (H) 0.1 - 1.0 10*3/uL    Eosinophils Absolute 1.1 (H) 0.0 - 0.5 10*3/uL    Basophils Absolute 0.1 0.0 - 0.2 10*3/uL    nRBC 0 0 - 0 %   D-Dimer, Quantitative   Result Value Ref Range    D-Dimer, Quant 0.35 0.01 - 0.49 mcg/mL FEU   APTT   Result Value Ref Range    aPTT 26.4 25.0 - 35.0 sec   PROTHROMBIN TIME (PT)   Result Value Ref Range    PT 12.0 (L) 12.3 - 14.8 sec    INR 0.89 (L) 0.91 - 1.16   Troponin   Result Value Ref Range    Troponin I 0.01 0.00 - 0.04 ng/mL   Basic Metabolic Panel   Result Value Ref Range    Glucose 117 (H) 70 - 100 mg/dL    BUN 17 7 - 25 mg/dL    CREATININE 0.66 0.60 - 1.20 mg/dL    Sodium 134 (L) 136 - 145 meq/L    Potassium 4.0 3.5 - 5.1 meq/L    Chloride 101 98 - 107 meq/L    CO2 25 21 - 31 meq/L    Calcium 9.4 8.6 - 10.3 mg/dL    EGFR IF NonAfrican American >60 >60 ml/min/1.73sq m    eGFR African American >60 >60 ml/min/1.73sq m   Brain Natriuretic Peptide   Result Value Ref Range    BNP 16 0 - 100 pg/mL   Urinalysis   Result Value Ref Range    Color, UA YELLOW YELLOW    Appearance CLEAR CLEAR    Specific Gravity, Urine 1.015 1.015 - 1.02    pH 7.0 5.0 - 8.0    Protein, Urine NEGATIVE NEGATIVE mg/dL    Glucose, UA NEGATIVE NEGATIVE mg/dL    Ketones, Urine NEGATIVE NEGATIVE mg/dL    Bilirubin Urine NEGATIVE NEGATIVE    Blood, Urine SMALL (A) NEGATIVE    Nitrite, Urine NEGATIVE NEGATIVE    Leukocyte Esterase, Urine TRACE (A) NEGATIVE    WBC, UA 0-2 (A) NONE SEEN /HPF    RBC 0-2 (A) NONE SEEN /HPF    Epithelial Cells, UA OCC FEW,OCC,NO /LPF   COVID-19    Specimen: Brain   Result Value Ref Range    SARS-CoV-2 Not Detected Not Detect   CBC with Differential   Result Value Ref Range    WBC 10.05 4.00 - 10.60 10*3/uL    RBC 4.21 3.80 - 5.00 10*6/uL    Hemoglobin 13.2 12.0 - 15.0 g/dL    Hematocrit 38.3 36.0 - 45.0 %    MCV 91.0 82.0 - 98.0 fL    MCH 31.4 27.0 - 33.0 pg    MCHC 34.5 32.0 - 35.0 g/dL    RDW 12.5 11.5 - 15.0 %    Platelets 977 294 - 372 10*3/uL    Neutrophils % 85.4 (H) 40.0 - 72.0 %    Immature Granulocytes 0.7 0.0 - 1.0 %    Lymphocytes % 7.5 (L) 20.0 - 45.0 %    Monocytes % 6.2 5.0 - 12.0 %    Eosinophils % 0.0 0.0 - 6.0 %    Basophils % 0.2 0.0 - 1.0 %    Neutrophils Absolute 8.6 (H) 1.6 - 7.6 10*3/uL    Absolute Immature Granulocyte 0.1 0.0 - 0.2 10*3/uL    Lymphocytes Absolute 0.8 (L) 1.2 - 4.0 10*3/uL    Monocytes Absolute 0.6 0.1 - 1.0 10*3/uL    Eosinophils Absolute 0.0 0.0 - 0.5 10*3/uL    Basophils Absolute 0.0 0.0 - 0.2 10*3/uL    nRBC 0 0 - 0 %   Basic Metabolic Panel   Result Value Ref Range    Glucose 136 (H) 70 - 100 mg/dL    BUN 11 7 - 25 mg/dL    CREATININE 0.55 (L) 0.60 - 1.20 mg/dL    Sodium 134 (L) 136 - 145 meq/L    Potassium 3.9 3.5 - 5.1 meq/L    Chloride 99 98 - 107 meq/L    CO2 25 21 - 31 meq/L    Calcium 9.5 8.6 - 10.3 mg/dL    EGFR IF NonAfrican American >60 >60 ml/min/1.73sq m    eGFR African American >60 >60 ml/min/1.73sq m   Magnesium, RBC   Result Value Ref Range    Magnesium 2.1 1.9 - 2.7 mg/dL   Phosphorus   Result Value Ref Range    Phosphorus 4.0 2.5 - 5.0 mg/dL   POC Glucose Fingerstick   Result Value Ref Range    POC Glucose 193 (H) 70 - 100 mg/dL   POC Glucose Fingerstick   Result Value Ref Range    POC Glucose 161 (H) 70 - 100 mg/dL   POC Glucose Fingerstick   Result Value Ref Range    POC Glucose 134 (H) 70 - 100 mg/dL   POC Glucose Fingerstick   Result Value Ref Range    POC Glucose 122 (H) 70 - 100 mg/dL   POC Glucose Fingerstick   Result Value Ref Range    POC Glucose 129 (H) 70 - 100 mg/dL   POC Glucose Fingerstick   Result Value Ref Range    POC Glucose 140 (H) 70 - 100 mg/dL       No results found. PHYSICAL EXAMINATION:  Due to this being a TeleHealth encounter, evaluation of the following organ systems is limited: Vitals/Constitutional/EENT/Resp/CV/GI//MS/Neuro/Skin/Heme-Lymph-Imm. Constitutional: [x] Appears well-developed and well-nourished. [x] Abnormal  Mental status  [x] Alert and awake  [x] Oriented to person/place/time [x]Able to follow commands    [x] No apparent distress      Eyes:  EOM    [x]  Normal  [] Abnormal-  Sclera  [x]  Normal  [] Abnormal -         Discharge [x]  None visible  [] Abnormal -    HENT:   [x] Normocephalic, atraumatic. [] Abnormal shaped head   [x] Mouth/Throat: Mucous membranes are moist.     Ears [x] Normal  [] Abnormal-    Neck: [x] Normal range of motion [x] Supple [x] No visualized mass. Pulmonary/Chest: [x] Respiratory effort normal.  [x] No visualized signs of difficulty breathing or respiratory distress        [] Abnormal      Musculoskeletal:   [x] Normal range of motion. [x] Normal gait with no signs of ataxia. [x]  No signs of cyanosis of the peripheral portions of extremities.          [] Abnormal       Neurological:        [x] Normal cranial nerve (limited exam to video visit) [x] No focal weakness observed       [] Abnormal          Speech       [x] Normal   [] Abnormal     Skin:        [x] No rash on visible skin  [x] Normal  [] Abnormal     Psychiatric:       [x] Normal  [] Abnormal        [x] Normal Mood  [] Anxious appearing        Other Pertinent Exam Findings:       ASSESSMENT:  Bronchial asthma    Obesity    Hypertension  Nasopharyngeal allergies   plan:   1. Patient has been doing very well I advised her to continue the same treatment as before. 2.   3. This includes long-acting beta agonist and short-acting beta agonist inhaled steroids and Singulair. She does have very good control of her asthma and so I did not feel the need to give any anti-eosinophilic medication. 4.   5. He was encouraged to lose weight  6.   7. She already had Pneumovax  8.   9. She already had flu vaccine. Blood pressure is under good control. 10.   11. No acute exacerbation is asthma. 12.   13. She is not a candidate for lung cancer screening  14.   15. She does have prednisone at home and she can use it if he gets acute exacerbation and at that time she will let her office know also. 12.   17. Dictated by Dr. Christian Finch MD dictation over thank you  18. An  electronic signature was used to authenticate this note. --Tiffanie Vaughn MD on 8/24/2020 at 11:40 AM    9}    Pursuant to the emergency declaration under the [facility], [facility] waiver authority and the [employer] Act, this Virtual  Visit was conducted, with patient's consent, to reduce the patient's risk of exposure to COVID-19 and provide continuity of care for an established patient. Services were provided through a video synchronous discussion virtually to substitute for in-person clinic visit. ___________________________________________________________________________________________________________________call serves to triage the patient into an appointment for the relevant concern

## 2020-08-24 NOTE — TELEPHONE ENCOUNTER
Called and was on hold forever, they can fax a request and a release form from patient to give records

## 2020-09-15 RX ORDER — IPRATROPIUM BROMIDE AND ALBUTEROL SULFATE 2.5; .5 MG/3ML; MG/3ML
SOLUTION RESPIRATORY (INHALATION)
Qty: 180 ML | Refills: 3 | Status: SHIPPED | OUTPATIENT
Start: 2020-09-15 | End: 2021-02-08

## 2020-09-21 RX ORDER — ENALAPRIL MALEATE AND HYDROCHLOROTHIAZIDE 10; 25 MG/1; MG/1
TABLET ORAL
Qty: 30 TABLET | Refills: 0 | Status: SHIPPED | OUTPATIENT
Start: 2020-09-21 | End: 2020-10-22

## 2020-10-09 ENCOUNTER — TELEPHONE (OUTPATIENT)
Dept: PULMONOLOGY | Age: 60
End: 2020-10-09

## 2020-10-12 RX ORDER — PREDNISONE 20 MG/1
TABLET ORAL
Qty: 35 TABLET | Refills: 0 | Status: SHIPPED | OUTPATIENT
Start: 2020-10-12 | End: 2020-10-27

## 2020-10-22 ENCOUNTER — E-VISIT (OUTPATIENT)
Dept: FAMILY MEDICINE CLINIC | Age: 60
End: 2020-10-22

## 2020-10-22 ENCOUNTER — TELEPHONE (OUTPATIENT)
Dept: FAMILY MEDICINE CLINIC | Age: 60
End: 2020-10-22

## 2020-10-22 RX ORDER — ENALAPRIL MALEATE AND HYDROCHLOROTHIAZIDE 10; 25 MG/1; MG/1
TABLET ORAL
Qty: 30 TABLET | Refills: 0 | Status: SHIPPED | OUTPATIENT
Start: 2020-10-22 | End: 2020-12-03 | Stop reason: SDUPTHER

## 2020-10-22 NOTE — TELEPHONE ENCOUNTER
Scheduled patient appt.  She stated it would need to be in 3-4 weeks out due to her taking prednisone

## 2020-10-22 NOTE — TELEPHONE ENCOUNTER
This patient has no kidney disease. She needs to be here for a regular office visit.     She should wear her mask

## 2020-10-27 NOTE — TELEPHONE ENCOUNTER
Dr Savage Huerta, patient is current but not scheduled for an appointment at this time. I sent a note to pharmacy asking that patient call for an appointment. Per last dictation has prednisone at home to use if gets acute exacerbation. Please sign for refill if ok. Thank you.

## 2020-10-30 RX ORDER — PREDNISONE 20 MG/1
40 TABLET ORAL DAILY
Qty: 20 TABLET | Refills: 0 | Status: SHIPPED | OUTPATIENT
Start: 2020-10-30 | End: 2020-12-29

## 2020-11-02 RX ORDER — MONTELUKAST SODIUM 10 MG/1
10 TABLET ORAL NIGHTLY
Qty: 30 TABLET | Refills: 11 | Status: SHIPPED | OUTPATIENT
Start: 2020-11-02

## 2020-11-12 ENCOUNTER — TELEPHONE (OUTPATIENT)
Dept: FAMILY MEDICINE CLINIC | Age: 60
End: 2020-11-12

## 2020-11-12 NOTE — TELEPHONE ENCOUNTER
Yes since she is on blood pressure lowering medication, her lower BP is most likely from the medication. As long as she is asymptomatic, just continue with current meds and monitoring.

## 2020-11-12 NOTE — TELEPHONE ENCOUNTER
/63, Patient has noticed it low and wants to know if this is ok. She wants to know if it's from the medication.    No Sx

## 2020-11-16 ENCOUNTER — TELEMEDICINE (OUTPATIENT)
Dept: PULMONOLOGY | Age: 60
End: 2020-11-16
Payer: COMMERCIAL

## 2020-11-16 PROCEDURE — 99214 OFFICE O/P EST MOD 30 MIN: CPT | Performed by: INTERNAL MEDICINE

## 2020-11-16 NOTE — PROGRESS NOTES
2 puffs by mouth every 6 hours if needed for wheezing  Dorian Singh MD   Multiple Vitamins-Minerals (THERAPEUTIC MULTIVITAMIN-MINERALS) tablet Take 1 tablet by mouth daily  Historical Provider, MD   albuterol (PROVENTIL) (2.5 MG/3ML) 0.083% nebulizer solution Take 3 mLs by nebulization every 6 hours as needed for Wheezing  Brie Cortez APRN - [facility]   fluticasone-salmeterol (ADVAIR) 500-50 MCG/DOSE diskus inhaler INHALE 1 PUFF INTO THE LUNGS EVERY 12 HOURS  Kishore Troncoso MD   ondansetron (ZOFRAN-ODT) 4 MG disintegrating tablet Take by mouth 2 times daily as needed  Historical Provider, MD   Nebulizers (COMPRESSOR/NEBULIZER) MISC 1 each by Does not apply route 4 times daily for 14 days Use with medications as directed  Kishore Troncoso MD   acetaminophen (APAP EXTRA STRENGTH) 500 MG tablet Take 500 mg by mouth every 6 hours as needed for Pain. Historical Provider, MD   Loratadine (CLARITIN) 10 MG CAPS Take  by mouth. Historical Provider, MD       Social History     Tobacco Use    Smoking status: Former Smoker     Packs/day: 0.25     Years: 0.50     Pack years: 0.12     Last attempt to quit: 2012     Years since quittin.9    Smokeless tobacco: Never Used   Substance Use Topics    Alcohol use: No    Drug use: No            RECORD REVIEW: Previous medical records were reviewed at today's visit.     Wt Readings from Last 3 Encounters:   20 159 lb (72.1 kg)   19 168 lb (76.2 kg)   10/11/19 166 lb (75.3 kg)       Results for orders placed or performed in visit on 20   CBC with Differential   Result Value Ref Range    WBC 9.61 4.00 - 10.60 10*3/uL    RBC 4.49 3.80 - 5.00 10*6/uL    Hemoglobin 14.3 12.0 - 15.0 g/dL    Hematocrit 39.4 36.0 - 45.0 %    MCV 87.8 82.0 - 98.0 fL    MCH 31.8 27.0 - 33.0 pg    MCHC 36.3 (H) 32.0 - 35.0 g/dL    RDW 12.2 11.5 - 15.0 %    Platelets 203 683 - 565 10*3/uL    Neutrophils % 50.8 40.0 - 72.0 %    Immature Granulocytes 0.3 0.0 - 1.0 %    Lymphocytes % 24.7 20.0 - 45.0 %    Monocytes % 12.1 (H) 5.0 - 12.0 %    Eosinophils % 10.9 (H) 0.0 - 6.0 %    Basophils % 1.2 (H) 0.0 - 1.0 %    Neutrophils Absolute 4.9 1.6 - 7.6 10*3/uL    Absolute Immature Granulocyte 0.0 0.0 - 0.2 10*3/uL    Lymphocytes Absolute 2.4 1.2 - 4.0 10*3/uL    Monocytes Absolute 1.2 (H) 0.1 - 1.0 10*3/uL    Eosinophils Absolute 1.1 (H) 0.0 - 0.5 10*3/uL    Basophils Absolute 0.1 0.0 - 0.2 10*3/uL    nRBC 0 0 - 0 %   D-Dimer, Quantitative   Result Value Ref Range    D-Dimer, Quant 0.35 0.01 - 0.49 mcg/mL FEU   APTT   Result Value Ref Range    aPTT 26.4 25.0 - 35.0 sec   PROTHROMBIN TIME (PT)   Result Value Ref Range    PT 12.0 (L) 12.3 - 14.8 sec    INR 0.89 (L) 0.91 - 1.16   Troponin   Result Value Ref Range    Troponin I 0.01 0.00 - 0.04 ng/mL   Basic Metabolic Panel   Result Value Ref Range    Glucose 117 (H) 70 - 100 mg/dL    BUN 17 7 - 25 mg/dL    CREATININE 0.66 0.60 - 1.20 mg/dL    Sodium 134 (L) 136 - 145 meq/L    Potassium 4.0 3.5 - 5.1 meq/L    Chloride 101 98 - 107 meq/L    CO2 25 21 - 31 meq/L    Calcium 9.4 8.6 - 10.3 mg/dL    EGFR IF NonAfrican American >60 >60 ml/min/1.73sq m    eGFR African American >60 >60 ml/min/1.73sq m   Brain Natriuretic Peptide   Result Value Ref Range    BNP 16 0 - 100 pg/mL   Urinalysis   Result Value Ref Range    Color, UA YELLOW YELLOW    Appearance CLEAR CLEAR    Specific Gravity, Urine 1.015 1.015 - 1.02    pH 7.0 5.0 - 8.0    Protein, Urine NEGATIVE NEGATIVE mg/dL    Glucose, UA NEGATIVE NEGATIVE mg/dL    Ketones, Urine NEGATIVE NEGATIVE mg/dL    Bilirubin Urine NEGATIVE NEGATIVE    Blood, Urine SMALL (A) NEGATIVE    Nitrite, Urine NEGATIVE NEGATIVE    Leukocyte Esterase, Urine TRACE (A) NEGATIVE    WBC, UA 0-2 (A) NONE SEEN /HPF    RBC 0-2 (A) NONE SEEN /HPF    Epithelial Cells, UA OCC FEW,OCC,NO /LPF   COVID-19    Specimen: Brain   Result Value Ref Range    SARS-CoV-2 Not Detected Not Detect   CBC with Differential   Result Value Ref Range    WBC 10.05 4.00 - 10.60 10*3/uL    RBC 4.21 3.80 - 5.00 10*6/uL    Hemoglobin 13.2 12.0 - 15.0 g/dL    Hematocrit 38.3 36.0 - 45.0 %    MCV 91.0 82.0 - 98.0 fL    MCH 31.4 27.0 - 33.0 pg    MCHC 34.5 32.0 - 35.0 g/dL    RDW 12.5 11.5 - 15.0 %    Platelets 276 693 - 845 10*3/uL    Neutrophils % 85.4 (H) 40.0 - 72.0 %    Immature Granulocytes 0.7 0.0 - 1.0 %    Lymphocytes % 7.5 (L) 20.0 - 45.0 %    Monocytes % 6.2 5.0 - 12.0 %    Eosinophils % 0.0 0.0 - 6.0 %    Basophils % 0.2 0.0 - 1.0 %    Neutrophils Absolute 8.6 (H) 1.6 - 7.6 10*3/uL    Absolute Immature Granulocyte 0.1 0.0 - 0.2 10*3/uL    Lymphocytes Absolute 0.8 (L) 1.2 - 4.0 10*3/uL    Monocytes Absolute 0.6 0.1 - 1.0 10*3/uL    Eosinophils Absolute 0.0 0.0 - 0.5 10*3/uL    Basophils Absolute 0.0 0.0 - 0.2 10*3/uL    nRBC 0 0 - 0 %   Basic Metabolic Panel   Result Value Ref Range    Glucose 136 (H) 70 - 100 mg/dL    BUN 11 7 - 25 mg/dL    CREATININE 0.55 (L) 0.60 - 1.20 mg/dL    Sodium 134 (L) 136 - 145 meq/L    Potassium 3.9 3.5 - 5.1 meq/L    Chloride 99 98 - 107 meq/L    CO2 25 21 - 31 meq/L    Calcium 9.5 8.6 - 10.3 mg/dL    EGFR IF NonAfrican American >60 >60 ml/min/1.73sq m    eGFR African American >60 >60 ml/min/1.73sq m   Magnesium, RBC   Result Value Ref Range    Magnesium 2.1 1.9 - 2.7 mg/dL   Phosphorus   Result Value Ref Range    Phosphorus 4.0 2.5 - 5.0 mg/dL   POC Glucose Fingerstick   Result Value Ref Range    POC Glucose 193 (H) 70 - 100 mg/dL   POC Glucose Fingerstick   Result Value Ref Range    POC Glucose 161 (H) 70 - 100 mg/dL   POC Glucose Fingerstick   Result Value Ref Range    POC Glucose 134 (H) 70 - 100 mg/dL   POC Glucose Fingerstick   Result Value Ref Range    POC Glucose 122 (H) 70 - 100 mg/dL   POC Glucose Fingerstick   Result Value Ref Range    POC Glucose 129 (H) 70 - 100 mg/dL   POC Glucose Fingerstick   Result Value Ref Range    POC Glucose 140 (H) 70 - 100 mg/dL       No results found.     PHYSICAL EXAMINATION:  Due to this being a An  electronic signature was used to authenticate this note. --Jeanette Montgomery MD on 11/16/2020 at 11:25 AM    9}    Pursuant to the emergency declaration under the [address] [place] [address] waiver authority and the [employer] Act, this Virtual  Visit was conducted, with patient's consent, to reduce the patient's risk of exposure to COVID-19 and provide continuity of care for an established patient. Services were provided through a video synchronous discussion virtually to substitute for in-person clinic visit.     _______________________________________________________________________________________________________________________________________________  FOR TELEPHONE VISITS PLEASE COMPLETE THE FOLLOWING      Consent:  She and/or health care decision maker is aware that that she may receive a bill for this telephone service, depending on her insurance coverage, and has provided verbal consent to proceed: Yes      I affirm this is a Patient Initiated Episode with an Established Patient who has not had a related appointment within my department in the past 7 days or scheduled within the next 24 hours.     Total Time: minutes: 21-30 minutes    Note: not billable if this call serves to triage the patient into an appointment for the relevant concern

## 2020-11-19 ENCOUNTER — TELEPHONE (OUTPATIENT)
Dept: ADMINISTRATIVE | Age: 60
End: 2020-11-19

## 2020-12-03 ENCOUNTER — OFFICE VISIT (OUTPATIENT)
Dept: FAMILY MEDICINE CLINIC | Age: 60
End: 2020-12-03
Payer: COMMERCIAL

## 2020-12-03 VITALS
HEIGHT: 64 IN | HEART RATE: 119 BPM | DIASTOLIC BLOOD PRESSURE: 98 MMHG | BODY MASS INDEX: 28.17 KG/M2 | TEMPERATURE: 97.3 F | SYSTOLIC BLOOD PRESSURE: 188 MMHG | WEIGHT: 165 LBS | OXYGEN SATURATION: 98 %

## 2020-12-03 PROCEDURE — 99214 OFFICE O/P EST MOD 30 MIN: CPT | Performed by: FAMILY MEDICINE

## 2020-12-03 RX ORDER — GUAIFENESIN 600 MG/1
600 TABLET, EXTENDED RELEASE ORAL 2 TIMES DAILY
Qty: 30 TABLET | Refills: 0 | Status: SHIPPED | OUTPATIENT
Start: 2020-12-03 | End: 2020-12-18

## 2020-12-03 RX ORDER — ENALAPRIL MALEATE AND HYDROCHLOROTHIAZIDE 10; 25 MG/1; MG/1
TABLET ORAL
Qty: 90 TABLET | Refills: 3 | Status: SHIPPED | OUTPATIENT
Start: 2020-12-03

## 2020-12-03 RX ORDER — OMEPRAZOLE 20 MG/1
20 CAPSULE, DELAYED RELEASE ORAL DAILY
Qty: 90 CAPSULE | Refills: 3 | Status: ON HOLD | OUTPATIENT
Start: 2020-12-03 | End: 2021-03-29

## 2020-12-03 ASSESSMENT — PATIENT HEALTH QUESTIONNAIRE - PHQ9
1. LITTLE INTEREST OR PLEASURE IN DOING THINGS: 0
SUM OF ALL RESPONSES TO PHQ QUESTIONS 1-9: 0
SUM OF ALL RESPONSES TO PHQ9 QUESTIONS 1 & 2: 0
SUM OF ALL RESPONSES TO PHQ QUESTIONS 1-9: 0
2. FEELING DOWN, DEPRESSED OR HOPELESS: 0
SUM OF ALL RESPONSES TO PHQ QUESTIONS 1-9: 0

## 2020-12-03 NOTE — PROGRESS NOTES
Subjective:  Bailey Morillo presents for   Chief Complaint   Patient presents with    Annual Exam     Lost 18 lbs over 2 months    Medication Refill    Arthritis     Bilat thumb base and R great toe     Weight loss is on purpose      bp at is about 130/75 at home. Both thumbs bother her . Hand is ok    Heat helps a little.,  Ice is used with extreme pain    Had broken the right big toe in the past      Patient Active Problem List   Diagnosis    Right sided sciatica    Asthma    Hypertension    Abnormal drug screen         · s. Objective:  Physical Exam   Vitals:   Vitals:    12/03/20 1326   BP: (!) 162/100   Pulse: 119   Temp: 97.3 °F (36.3 °C)   SpO2: 98%   Weight: 165 lb (74.8 kg)   Height: 5' 4\" (1.626 m)     Vitals:    12/03/20 1326   BP: (!) 162/100   Pulse: 119   Temp: 97.3 °F (36.3 °C)   SpO2: 98%   Weight: 165 lb (74.8 kg)   Height: 5' 4\" (1.626 m)       Wt Readings from Last 3 Encounters:   12/03/20 165 lb (74.8 kg)   05/29/20 159 lb (72.1 kg)   12/06/19 168 lb (76.2 kg)     Ht Readings from Last 3 Encounters:   12/03/20 5' 4\" (1.626 m)   05/29/20 5' 4\" (1.626 m)   12/06/19 5' 4\" (1.626 m)     Body mass index is 28.32 kg/m². Constitutional: She is oriented to person, place, and time. She appears well-developed and well-nourished and in no acute distress. Answers all my questions appropriately. Head: Normocephalic and atraumatic. Eyes:conjunctiva appear normal.  Nose: pink, non-edematous mucosa. No polyps. No septal deviation  Throat: no erythema, tonsillar hypertrophy or exudate. No ulcerations noted. Lips/Teeth/Gums all appear normal.  Neck: Normal range of motion. Neck supple. No tracheal deviation present. No abnormal lymphadenopathy. No JVD noted. Carotids are clear bilaterally. No thyroid masses noted. Heart: RRR without murmur. No S3, S4, or gallop noted. Chest: Clear to auscultation bilaterally. Good breath sounds noted. No rales, wheezes, or rhonchi noted.     No respiratory retractions noted. Wall has symmetrical movement with respirations. Has some swelling enalrgement and tnederesnn of the right tumb. Weaker hand grasp copared to the lef.t    Has a right bunion  Assessment:   Encounter Diagnoses   Name Primary?  Essential hypertension Yes    Arthritis     Right foot pain     Bunion of right foot          Plan:     Medications Discontinued During This Encounter   Medication Reason    enalapril-hydroCHLOROthiazide (VASERETIC) 10-25 MG per tablet REORDER    omeprazole (PRILOSEC) 20 MG delayed release capsule REORDER     THE ABOVE NOTED DISCONTINUED MEDS MAY ONLY BE FROM 'CLEANING UP' THE MED LIST AND WERE NOT ACTUALLY CANCELED;  SEE CHART FOR DETAILS! Orders Placed This Encounter   Medications    enalapril-hydroCHLOROthiazide (VASERETIC) 10-25 MG per tablet     Sig: take 1 tablet by mouth once daily     Dispense:  90 tablet     Refill:  3    guaiFENesin (MUCINEX) 600 MG extended release tablet     Sig: Take 1 tablet by mouth 2 times daily for 15 days     Dispense:  30 tablet     Refill:  0    omeprazole (PRILOSEC) 20 MG delayed release capsule     Sig: Take 1 capsule by mouth Daily     Dispense:  90 capsule     Refill:  3     Orders Placed This Encounter   Procedures    [facility] Blood Pressure Flowsheet     Order Specific Question:   After how many days would you like to receive a notification of this     Answer:   27     Order Specific Question:   After how many readings would you like to receive a notification of this patient's flowsheet's entries? Answer:   27     Order Specific Question:   What is the highest normal systolic value for this patient? Answer:   180     Order Specific Question:   What is the lowest normal systolic value for this patient? Answer:   80     Order Specific Question:   What is the highest normal diastolic value for this patient?      Answer:   80     Order Specific Question:   What is the lowest normal diastolic value for this patient? Answer:   52    XR HAND LEFT (MIN 3 VIEWS)     Standing Status:   Future     Standing Expiration Date:   12/3/2021     Order Specific Question:   Reason for exam:     Answer:   See ICDM-10 code attached    XR HAND RIGHT (MIN 3 VIEWS)     Standing Status:   Future     Standing Expiration Date:   12/3/2021     Order Specific Question:   Reason for exam:     Answer:   See ICDM-10 code attached    CBC Auto Differential     Standing Status:   Future     Standing Expiration Date:   12/3/2021    Comprehensive Metabolic Panel     Standing Status:   Future     Standing Expiration Date:   12/3/2021    Lipid Panel     Standing Status:   Future     Standing Expiration Date:   12/3/2021     Order Specific Question:   Is Patient Fasting?/# of Hours     Answer:   8    TSH with Reflex     Standing Status:   Future     Standing Expiration Date:   12/3/2021    Sedimentation rate, automated     Standing Status:   Future     Standing Expiration Date:   12/3/2021    TRAV - Tasneem Bazzi DPM, Podiatry, [place]     Referral Priority:   Routine     Referral Type:   Eval and Treat     Referral Reason:   Specialty Services Required     Requested Specialty:   Podiatry     Number of Visits Requested:   1     Return in about 6 months (around 6/3/2021). There are no Patient Instructions on file for this visit. Data Unavailable      Use tylenol at it's max dose for 2 weeks  And let me know how it works    continue checking bp at home    Her montior was checked today and is similar in readings . . her home readings will be scanned into media - and they  Look fine

## 2020-12-04 ENCOUNTER — PATIENT MESSAGE (OUTPATIENT)
Dept: FAMILY MEDICINE CLINIC | Age: 60
End: 2020-12-04

## 2020-12-04 NOTE — TELEPHONE ENCOUNTER
From: Leticia Cordero  To: Yakov Dai MD  Sent: 12/4/2020 11:01 AM EST  Subject: Non-Urgent Medical Question    It completely slipped my mind yesterday.  I want orders for a mammogram and a pelvic exam.    Thx-  S

## 2020-12-04 NOTE — TELEPHONE ENCOUNTER
From: Elsie Darling  To: Dejan Payton MD  Sent: 12/4/2020 7:13 AM EST  Subject: Visit Follow-Up Question    Thx-

## 2020-12-04 NOTE — TELEPHONE ENCOUNTER
From: Jerry Saravia MD  To: Tracey Muller  Sent: 12/4/2020 5:57 AM EST  Subject: bp    These look good.  Keep checking        Adelso Mike M.D.  [address]    (L) [phone]  (L) [phone]    Electronically signed on 12/4/2020 at 5:57 AM

## 2020-12-07 ENCOUNTER — PATIENT MESSAGE (OUTPATIENT)
Dept: FAMILY MEDICINE CLINIC | Age: 60
End: 2020-12-07

## 2020-12-07 NOTE — TELEPHONE ENCOUNTER
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 Would it stand to reason I would be also?  He was diagnosed on 12/2/2020  Thx-

## 2020-12-07 NOTE — TELEPHONE ENCOUNTER
ok for the kati.    I don't see that we have ever taken care of her paps, refer her back to her gyne

## 2020-12-28 NOTE — TELEPHONE ENCOUNTER
The patient stated that she usually have this medication on hand.  She is a little congested and feel that her asthma is starting to [employer] up\" she wants to stop this before she gets worse Please advise if she is supposed to be on this medication

## 2020-12-29 RX ORDER — PREDNISONE 20 MG/1
40 TABLET ORAL DAILY
Qty: 20 TABLET | Refills: 0 | Status: SHIPPED | OUTPATIENT
Start: 2020-12-29 | End: 2021-01-14 | Stop reason: SDUPTHER

## 2020-12-30 ENCOUNTER — PATIENT MESSAGE (OUTPATIENT)
Dept: PULMONOLOGY | Age: 60
End: 2020-12-30

## 2021-01-04 RX ORDER — ALBUTEROL SULFATE 90 UG/1
AEROSOL, METERED RESPIRATORY (INHALATION)
Qty: 1 INHALER | Refills: 10 | Status: SHIPPED | OUTPATIENT
Start: 2021-01-04

## 2021-01-04 NOTE — TELEPHONE ENCOUNTER
Dr Padmaja Dean, patient is current but not scheduled for an appointment at this time. I sent a note to pharmacy asking that patient call for an appointment. Please sign for refill if ok. Thank you.

## 2021-01-11 ENCOUNTER — TELEMEDICINE (OUTPATIENT)
Dept: PULMONOLOGY | Age: 61
End: 2021-01-11
Payer: COMMERCIAL

## 2021-01-11 DIAGNOSIS — J45.41 MODERATE PERSISTENT ASTHMA WITH ACUTE EXACERBATION: Primary | ICD-10-CM

## 2021-01-11 PROCEDURE — 99214 OFFICE O/P EST MOD 30 MIN: CPT | Performed by: INTERNAL MEDICINE

## 2021-01-11 NOTE — PROGRESS NOTES
2021    TELEHEALTH EVALUATION -- Audio/Visual (During [id number] public health emergency)    Patient and physician are located in their individual locations. This is visit is completed via Apprion application [x]/ Doxy. me[] / Telephone []     HPI:    Alcario Dakins (:  1960) has requested an audio/video evaluation for the following concern(s):    Respiratory not have bronchial asthma which is moderate persistent which is treated with bronchodilators including inhaled steroid. No long-acting beta agonist.  Does not have to use her short-acting beta agonist very frequently recently she had a upper respiratory infection with residual sore throat some wheezing no excessive cough no sputum production sputum was not yellow no fever or chills no chest pain    He been treated with steroid with mostly symptoms are resolved. Her hypertension is under good control    Weight is under good control    No symptoms of acid reflux. She already had flu vaccine. She is suggested to get the Covid vaccine. Review of Systems    Prior to Visit Medications    Medication Sig Taking?  Authorizing Provider   albuterol sulfate HFA (VENTOLIN HFA) 108 (90 Base) MCG/ACT inhaler inhale 2 puffs by mouth every 6 hours if needed for wheezing Yes Gissell Estevez MD   enalapril-hydroCHLOROthiazide (VASERETIC) 10-25 MG per tablet take 1 tablet by mouth once daily Yes Rashid Tovar MD   omeprazole (PRILOSEC) 20 MG delayed release capsule Take 1 capsule by mouth Daily Yes Rashid Tovar MD   montelukast (SINGULAIR) 10 MG tablet Take 1 tablet by mouth nightly take 1 tablet by mouth every evening Yes Rashid Tovar MD   ipratropium-albuterol (DUONEB) 0.5-2.5 (3) MG/3ML SOLN nebulizer solution inhale contents of 1 vial every 4 hours in nebulizer Yes Rashid Tovar MD   fluticasone (FLONASE) 50 MCG/ACT nasal spray 1 spray by Each Nostril route daily Yes Historical Provider, MD   predniSONE (DELTASONE) 10 MG tablet 40 mg po x 5 days Yes Yvonne Fernandez MD   Multiple Vitamins-Minerals (THERAPEUTIC MULTIVITAMIN-MINERALS) tablet Take 1 tablet by mouth daily Yes Historical Provider, MD   albuterol (PROVENTIL) (2.5 MG/3ML) 0.083% nebulizer solution Take 3 mLs by nebulization every 6 hours as needed for Wheezing Yes EDUARDO Reyez CNP   fluticasone-salmeterol (ADVAIR) 500-50 MCG/DOSE diskus inhaler INHALE 1 PUFF INTO THE LUNGS EVERY 12 HOURS Yes Cleveland Rhodes MD   acetaminophen (APAP EXTRA STRENGTH) 500 MG tablet Take 500 mg by mouth every 6 hours as needed for Pain. Yes Historical Provider, MD   Loratadine (CLARITIN) 10 MG CAPS Take  by mouth. Yes Historical Provider, MD   Nebulizers (COMPRESSOR/NEBULIZER) MISC 1 each by Does not apply route 4 times daily for 14 days Use with medications as directed  Cleveland Rhodes MD       Social History     Tobacco Use    Smoking status: Former Smoker     Packs/day: 0.25     Years: 0.50     Pack years: 0.12     Quit date: 2012     Years since quittin.0    Smokeless tobacco: Never Used   Substance Use Topics    Alcohol use: No    Drug use: No            RECORD REVIEW: Previous medical records were reviewed at today's visit.     Wt Readings from Last 3 Encounters:   20 165 lb (74.8 kg)   20 159 lb (72.1 kg)   19 168 lb (76.2 kg)       Results for orders placed or performed in visit on 20   CBC with Differential   Result Value Ref Range    WBC 9.61 4.00 - 10.60 10*3/uL    RBC 4.49 3.80 - 5.00 10*6/uL    Hemoglobin 14.3 12.0 - 15.0 g/dL    Hematocrit 39.4 36.0 - 45.0 %    MCV 87.8 82.0 - 98.0 fL    MCH 31.8 27.0 - 33.0 pg    MCHC 36.3 (H) 32.0 - 35.0 g/dL    RDW 12.2 11.5 - 15.0 %    Platelets 301 118 - 955 10*3/uL    Neutrophils % 50.8 40.0 - 72.0 %    Immature Granulocytes 0.3 0.0 - 1.0 %    Lymphocytes % 24.7 20.0 - 45.0 %    Monocytes % 12.1 (H) 5.0 - 12.0 %    Eosinophils % 10.9 (H) 0.0 - 6.0 %    Basophils % 1.2 (H) 0.0 - 1.0 %    Neutrophils Absolute 4.9 1.6 - 7.6 10*3/uL    Absolute Immature Granulocyte 0.0 0.0 - 0.2 10*3/uL    Lymphocytes Absolute 2.4 1.2 - 4.0 10*3/uL    Monocytes Absolute 1.2 (H) 0.1 - 1.0 10*3/uL    Eosinophils Absolute 1.1 (H) 0.0 - 0.5 10*3/uL    Basophils Absolute 0.1 0.0 - 0.2 10*3/uL    nRBC 0 0 - 0 %   D-Dimer, Quantitative   Result Value Ref Range    D-Dimer, Quant 0.35 0.01 - 0.49 mcg/mL FEU   APTT   Result Value Ref Range    aPTT 26.4 25.0 - 35.0 sec   PROTHROMBIN TIME (PT)   Result Value Ref Range    PT 12.0 (L) 12.3 - 14.8 sec    INR 0.89 (L) 0.91 - 1.16   Troponin   Result Value Ref Range    Troponin I 0.01 0.00 - 0.04 ng/mL   Basic Metabolic Panel   Result Value Ref Range    Glucose 117 (H) 70 - 100 mg/dL    BUN 17 7 - 25 mg/dL    CREATININE 0.66 0.60 - 1.20 mg/dL    Sodium 134 (L) 136 - 145 meq/L    Potassium 4.0 3.5 - 5.1 meq/L    Chloride 101 98 - 107 meq/L    CO2 25 21 - 31 meq/L    Calcium 9.4 8.6 - 10.3 mg/dL    EGFR IF NonAfrican American >60 >60 ml/min/1.73sq m    eGFR African American >60 >60 ml/min/1.73sq m   Brain Natriuretic Peptide   Result Value Ref Range    BNP 16 0 - 100 pg/mL   Urinalysis   Result Value Ref Range    Color, UA YELLOW YELLOW    Appearance CLEAR CLEAR    Specific Gravity, Urine 1.015 1.015 - 1.02    pH 7.0 5.0 - 8.0    Protein, Urine NEGATIVE NEGATIVE mg/dL    Glucose, UA NEGATIVE NEGATIVE mg/dL    Ketones, Urine NEGATIVE NEGATIVE mg/dL    Bilirubin Urine NEGATIVE NEGATIVE    Blood, Urine SMALL (A) NEGATIVE    Nitrite, Urine NEGATIVE NEGATIVE    Leukocyte Esterase, Urine TRACE (A) NEGATIVE    WBC, UA 0-2 (A) NONE SEEN /HPF    RBC 0-2 (A) NONE SEEN /HPF    Epithelial Cells, UA OCC FEW,OCC,NO /LPF   COVID-19    Specimen: Brain   Result Value Ref Range    SARS-CoV-2 Not Detected Not Detect   CBC with Differential   Result Value Ref Range    WBC 10.05 4.00 - 10.60 10*3/uL    RBC 4.21 3.80 - 5.00 10*6/uL    Hemoglobin 13.2 12.0 - 15.0 g/dL    Hematocrit 38.3 36.0 - 45.0 %    MCV 91.0 82.0 - 98.0 fL MCH 31.4 27.0 - 33.0 pg    MCHC 34.5 32.0 - 35.0 g/dL    RDW 12.5 11.5 - 15.0 %    Platelets 487 783 - 723 10*3/uL    Neutrophils % 85.4 (H) 40.0 - 72.0 %    Immature Granulocytes 0.7 0.0 - 1.0 %    Lymphocytes % 7.5 (L) 20.0 - 45.0 %    Monocytes % 6.2 5.0 - 12.0 %    Eosinophils % 0.0 0.0 - 6.0 %    Basophils % 0.2 0.0 - 1.0 %    Neutrophils Absolute 8.6 (H) 1.6 - 7.6 10*3/uL    Absolute Immature Granulocyte 0.1 0.0 - 0.2 10*3/uL    Lymphocytes Absolute 0.8 (L) 1.2 - 4.0 10*3/uL    Monocytes Absolute 0.6 0.1 - 1.0 10*3/uL    Eosinophils Absolute 0.0 0.0 - 0.5 10*3/uL    Basophils Absolute 0.0 0.0 - 0.2 10*3/uL    nRBC 0 0 - 0 %   Basic Metabolic Panel   Result Value Ref Range    Glucose 136 (H) 70 - 100 mg/dL    BUN 11 7 - 25 mg/dL    CREATININE 0.55 (L) 0.60 - 1.20 mg/dL    Sodium 134 (L) 136 - 145 meq/L    Potassium 3.9 3.5 - 5.1 meq/L    Chloride 99 98 - 107 meq/L    CO2 25 21 - 31 meq/L    Calcium 9.5 8.6 - 10.3 mg/dL    EGFR IF NonAfrican American >60 >60 ml/min/1.73sq m    eGFR African American >60 >60 ml/min/1.73sq m   Magnesium, RBC   Result Value Ref Range    Magnesium 2.1 1.9 - 2.7 mg/dL   Phosphorus   Result Value Ref Range    Phosphorus 4.0 2.5 - 5.0 mg/dL   POC Glucose Fingerstick   Result Value Ref Range    POC Glucose 193 (H) 70 - 100 mg/dL   POC Glucose Fingerstick   Result Value Ref Range    POC Glucose 161 (H) 70 - 100 mg/dL   POC Glucose Fingerstick   Result Value Ref Range    POC Glucose 134 (H) 70 - 100 mg/dL   POC Glucose Fingerstick   Result Value Ref Range    POC Glucose 122 (H) 70 - 100 mg/dL   POC Glucose Fingerstick   Result Value Ref Range    POC Glucose 129 (H) 70 - 100 mg/dL   POC Glucose Fingerstick   Result Value Ref Range    POC Glucose 140 (H) 70 - 100 mg/dL       No results found. PHYSICAL EXAMINATION:  Due to this being a TeleHealth encounter, evaluation of the following organ systems is limited: Vitals/Constitutional/EENT/Resp/CV/GI//MS/Neuro/Skin/Heme-Lymph-Imm. Constitutional: [x] Appears well-developed and well-nourished. [x] Abnormal  Mental status  [x] Alert and awake  [x] Oriented to person/place/time [x]Able to follow commands    [x] No apparent distress      Eyes:  EOM    [x]  Normal  [] Abnormal-  Sclera  [x]  Normal  [] Abnormal -         Discharge [x]  None visible  [] Abnormal -    HENT:   [x] Normocephalic, atraumatic. [] Abnormal shaped head   [x] Mouth/Throat: Mucous membranes are moist.     Ears [x] Normal  [] Abnormal-    Neck: [x] Normal range of motion [x] Supple [x] No visualized mass. Pulmonary/Chest: [x] Respiratory effort normal.  [x] No visualized signs of difficulty breathing or respiratory distress        [] Abnormal      Musculoskeletal:   [x] Normal range of motion. [x] Normal gait with no signs of ataxia. [x]  No signs of cyanosis of the peripheral portions of extremities. [] Abnormal       Neurological:        [x] Normal cranial nerve (limited exam to video visit) [x] No focal weakness observed       [] Abnormal          Speech       [x] Normal   [] Abnormal     Skin:        [x] No rash on visible skin  [x] Normal  [] Abnormal     Psychiatric:       [x] Normal  [] Abnormal        [x] Normal Mood  [] Anxious appearing        Other Pertinent Exam Findings:       ASSESSMENT:    Bronchial asthma moderate persistent   systemic hypertension    Plan  Patient to call for a second call. She probably had an upper respiratory infection which has resolved with the use of steroids. There does not seem to be any evidence of acute exacerbation of asthma or evidence of active chest infection no need for antibiotics  He already had flu vaccine    She is scheduled to get Covid vaccine. Patient advised to contact the office if the symptoms recur or get any worse. She will continue her bronchodilators as before.   Patient was reassured

## 2021-01-13 ENCOUNTER — PATIENT MESSAGE (OUTPATIENT)
Dept: PULMONOLOGY | Age: 61
End: 2021-01-13

## 2021-01-14 RX ORDER — PREDNISONE 20 MG/1
40 TABLET ORAL DAILY
Qty: 10 TABLET | Refills: 0 | OUTPATIENT
Start: 2021-01-14 | End: 2021-01-19

## 2021-01-14 NOTE — TELEPHONE ENCOUNTER
I spoke with Dr Padmaja Dean and informed him of patient's my chart message, last prescription date for prednisone and of his last dictation from 1/11/21. He asked that I send in prednisone 40mg daily for five days. I called script into [facility]. I spoke with Raman Salgado.

## 2021-01-14 NOTE — TELEPHONE ENCOUNTER
Dr Joseline Powers, please see my chart message from patient. She was just seen on 1/11/21. Per patient she is requesting steroids for exacerbation. Prednisone last prescribed on 12/29/20 for 10 days. I have pended another script for prednisone for your signature should you agree. Thank you.

## 2021-01-14 NOTE — TELEPHONE ENCOUNTER
From: Cali Isidro  To: Hansa Reeder MD  Sent: 1/13/2021 5:31 PM EST  Subject: Prescription Question    Hi - will check on the Prednisone Dr. Josh Hitchcock was ok'ing for refill yesterday, please. I went through [address] [place] [address] as a follow-up and it's not done. Please let me know I'm doing a burst for 10 days for this exacerbation and I have been without since yesterday. Thank you very much.     Jazmyn Hilliard

## 2021-01-20 ENCOUNTER — TELEPHONE (OUTPATIENT)
Dept: FAMILY MEDICINE CLINIC | Age: 61
End: 2021-01-20

## 2021-01-27 ENCOUNTER — TELEPHONE (OUTPATIENT)
Dept: FAMILY MEDICINE CLINIC | Age: 61
End: 2021-01-27

## 2021-01-27 NOTE — TELEPHONE ENCOUNTER
This is day 10 of following CDC guidelines,  Pt has no symptoms, vitals are good.   She just wants you to know

## 2021-02-08 ENCOUNTER — HOSPITAL ENCOUNTER (OUTPATIENT)
Age: 61
Discharge: HOME | End: 2021-02-08
Payer: COMMERCIAL

## 2021-02-08 ENCOUNTER — TELEPHONE (OUTPATIENT)
Dept: FAMILY MEDICINE CLINIC | Age: 61
End: 2021-02-08

## 2021-02-08 ENCOUNTER — OFFICE VISIT (OUTPATIENT)
Dept: FAMILY MEDICINE CLINIC | Age: 61
End: 2021-02-08
Payer: COMMERCIAL

## 2021-02-08 ENCOUNTER — HOSPITAL ENCOUNTER (OUTPATIENT)
Dept: CT IMAGING | Age: 61
Discharge: HOME | End: 2021-02-10
Payer: COMMERCIAL

## 2021-02-08 VITALS
OXYGEN SATURATION: 97 % | TEMPERATURE: 97.7 F | HEART RATE: 110 BPM | DIASTOLIC BLOOD PRESSURE: 82 MMHG | SYSTOLIC BLOOD PRESSURE: 136 MMHG

## 2021-02-08 DIAGNOSIS — R10.32 LEFT LOWER QUADRANT ABDOMINAL PAIN: ICD-10-CM

## 2021-02-08 DIAGNOSIS — E87.1 HYPONATREMIA: ICD-10-CM

## 2021-02-08 DIAGNOSIS — R10.32 LEFT LOWER QUADRANT ABDOMINAL PAIN: Primary | ICD-10-CM

## 2021-02-08 DIAGNOSIS — R00.0 TACHYCARDIA: ICD-10-CM

## 2021-02-08 DIAGNOSIS — K57.92 ACUTE DIVERTICULITIS: ICD-10-CM

## 2021-02-08 DIAGNOSIS — R35.0 URINARY FREQUENCY: ICD-10-CM

## 2021-02-08 DIAGNOSIS — J18.9 PNEUMONIA OF RIGHT LOWER LOBE DUE TO INFECTIOUS ORGANISM: ICD-10-CM

## 2021-02-08 DIAGNOSIS — R11.0 NAUSEA: ICD-10-CM

## 2021-02-08 LAB
-: ABNORMAL
ABSOLUTE EOS #: 0.63 K/UL (ref 0–0.4)
ABSOLUTE IMMATURE GRANULOCYTE: 0 K/UL (ref 0–0.3)
ABSOLUTE LYMPH #: 1.88 K/UL (ref 1–4.8)
ABSOLUTE MONO #: 1.73 K/UL (ref 0.2–0.8)
ALBUMIN SERPL-MCNC: 4.3 G/DL (ref 3.5–5.2)
ALBUMIN/GLOBULIN RATIO: ABNORMAL (ref 1–2.5)
ALP BLD-CCNC: 80 U/L (ref 35–104)
ALT SERPL-CCNC: 20 U/L (ref 5–33)
AMORPHOUS: ABNORMAL
AMYLASE: 37 U/L (ref 28–100)
ANION GAP SERPL CALCULATED.3IONS-SCNC: 13 MMOL/L (ref 9–17)
AST SERPL-CCNC: 17 U/L
BACTERIA: ABNORMAL
BASOPHILS # BLD: 1 %
BASOPHILS ABSOLUTE: 0.16 K/UL (ref 0–0.2)
BILIRUB SERPL-MCNC: 0.48 MG/DL (ref 0.3–1.2)
BILIRUBIN URINE: NEGATIVE
BUN BLDV-MCNC: 11 MG/DL (ref 6–20)
BUN/CREAT BLD: 22 (ref 9–20)
CALCIUM SERPL-MCNC: 9.7 MG/DL (ref 8.6–10.4)
CASTS UA: ABNORMAL /LPF (ref 0–8)
CHLORIDE BLD-SCNC: 92 MMOL/L (ref 98–107)
CO2: 24 MMOL/L (ref 20–31)
COLOR: YELLOW
CREAT SERPL-MCNC: 0.5 MG/DL (ref 0.5–0.9)
CRYSTALS, UA: ABNORMAL /HPF
DIFFERENTIAL TYPE: ABNORMAL
EOSINOPHILS RELATIVE PERCENT: 4 % (ref 1–4)
EPITHELIAL CELLS UA: ABNORMAL /HPF (ref 0–5)
GFR AFRICAN AMERICAN: >60 ML/MIN
GFR NON-AFRICAN AMERICAN: >60 ML/MIN
GFR SERPL CREATININE-BSD FRML MDRD: ABNORMAL ML/MIN/{1.73_M2}
GFR SERPL CREATININE-BSD FRML MDRD: ABNORMAL ML/MIN/{1.73_M2}
GLUCOSE BLD-MCNC: 120 MG/DL (ref 70–99)
GLUCOSE URINE: NEGATIVE
HCT VFR BLD CALC: 40 % (ref 36.3–47.1)
HEMOGLOBIN: 14.1 G/DL (ref 11.9–15.1)
IMMATURE GRANULOCYTES: 0 %
KETONES, URINE: ABNORMAL
LACTIC ACID, WHOLE BLOOD: NORMAL MMOL/L (ref 0.7–2.1)
LACTIC ACID: 0.7 MMOL/L (ref 0.5–2.2)
LEUKOCYTE ESTERASE, URINE: ABNORMAL
LIPASE: 19 U/L (ref 13–60)
LYMPHOCYTES # BLD: 12 % (ref 24–44)
MCH RBC QN AUTO: 31.9 PG (ref 25.2–33.5)
MCHC RBC AUTO-ENTMCNC: 35.3 G/DL (ref 28.4–34.8)
MCV RBC AUTO: 90.5 FL (ref 82.6–102.9)
MONOCYTES # BLD: 11 % (ref 1–7)
MUCUS: ABNORMAL
NITRITE, URINE: NEGATIVE
NRBC AUTOMATED: 0 PER 100 WBC
OTHER OBSERVATIONS UA: ABNORMAL
PDW BLD-RTO: 11.5 % (ref 11.8–14.4)
PH UA: 8 (ref 5–8)
PLATELET # BLD: 398 K/UL (ref 138–453)
PLATELET ESTIMATE: ABNORMAL
PMV BLD AUTO: 8.6 FL (ref 8.1–13.5)
POTASSIUM SERPL-SCNC: 3.9 MMOL/L (ref 3.7–5.3)
PROTEIN UA: NEGATIVE
RBC # BLD: 4.42 M/UL (ref 3.95–5.11)
RBC # BLD: ABNORMAL 10*6/UL
RBC UA: ABNORMAL /HPF (ref 0–4)
RENAL EPITHELIAL, UA: ABNORMAL /HPF
SEG NEUTROPHILS: 72 % (ref 36–66)
SEGMENTED NEUTROPHILS ABSOLUTE COUNT: 11.3 K/UL (ref 1.8–7.7)
SODIUM BLD-SCNC: 129 MMOL/L (ref 135–144)
SPECIFIC GRAVITY UA: 1.01 (ref 1–1.03)
TOTAL PROTEIN: 7 G/DL (ref 6.4–8.3)
TRICHOMONAS: ABNORMAL
TURBIDITY: CLEAR
URINE HGB: NEGATIVE
UROBILINOGEN, URINE: NORMAL
WBC # BLD: 15.7 K/UL (ref 3.5–11.3)
WBC # BLD: ABNORMAL 10*3/UL
WBC UA: ABNORMAL /HPF (ref 0–5)
YEAST: ABNORMAL

## 2021-02-08 PROCEDURE — 36415 COLL VENOUS BLD VENIPUNCTURE: CPT

## 2021-02-08 PROCEDURE — 83605 ASSAY OF LACTIC ACID: CPT

## 2021-02-08 PROCEDURE — 85025 COMPLETE CBC W/AUTO DIFF WBC: CPT

## 2021-02-08 PROCEDURE — 74176 CT ABD & PELVIS W/O CONTRAST: CPT

## 2021-02-08 PROCEDURE — 87086 URINE CULTURE/COLONY COUNT: CPT

## 2021-02-08 PROCEDURE — 81001 URINALYSIS AUTO W/SCOPE: CPT

## 2021-02-08 PROCEDURE — 99215 OFFICE O/P EST HI 40 MIN: CPT | Performed by: NURSE PRACTITIONER

## 2021-02-08 PROCEDURE — 83690 ASSAY OF LIPASE: CPT

## 2021-02-08 PROCEDURE — 2500000003 HC RX 250 WO HCPCS: Performed by: NURSE PRACTITIONER

## 2021-02-08 PROCEDURE — 82150 ASSAY OF AMYLASE: CPT

## 2021-02-08 PROCEDURE — 80053 COMPREHEN METABOLIC PANEL: CPT

## 2021-02-08 RX ORDER — METRONIDAZOLE 500 MG/1
500 TABLET ORAL 3 TIMES DAILY
Qty: 30 TABLET | Refills: 0 | Status: SHIPPED | OUTPATIENT
Start: 2021-02-08 | End: 2021-02-18

## 2021-02-08 RX ORDER — CIPROFLOXACIN 500 MG/1
500 TABLET, FILM COATED ORAL 2 TIMES DAILY
Qty: 20 TABLET | Refills: 0 | Status: SHIPPED | OUTPATIENT
Start: 2021-02-08 | End: 2021-02-18

## 2021-02-08 RX ORDER — ONDANSETRON 4 MG/1
4 TABLET, ORALLY DISINTEGRATING ORAL EVERY 8 HOURS PRN
Qty: 30 TABLET | Refills: 0 | Status: SHIPPED | OUTPATIENT
Start: 2021-02-08 | End: 2021-02-08 | Stop reason: SDUPTHER

## 2021-02-08 RX ORDER — ONDANSETRON 4 MG/1
4 TABLET, ORALLY DISINTEGRATING ORAL EVERY 8 HOURS PRN
Qty: 30 TABLET | Refills: 0 | Status: ON HOLD | OUTPATIENT
Start: 2021-02-08 | End: 2021-03-29

## 2021-02-08 RX ADMIN — BARIUM SULFATE 450 ML: 20 SUSPENSION ORAL at 16:23

## 2021-02-08 SDOH — ECONOMIC STABILITY: TRANSPORTATION INSECURITY
IN THE PAST 12 MONTHS, HAS THE LACK OF TRANSPORTATION KEPT YOU FROM MEDICAL APPOINTMENTS OR FROM GETTING MEDICATIONS?: NO

## 2021-02-08 SDOH — ECONOMIC STABILITY: INCOME INSECURITY: HOW HARD IS IT FOR YOU TO PAY FOR THE VERY BASICS LIKE FOOD, HOUSING, MEDICAL CARE, AND HEATING?: NOT HARD AT ALL

## 2021-02-08 ASSESSMENT — ENCOUNTER SYMPTOMS
DIARRHEA: 0
ANAL BLEEDING: 0
ABDOMINAL PAIN: 1
RESPIRATORY NEGATIVE: 1
HEMATOCHEZIA: 1
SHORTNESS OF BREATH: 0
NAUSEA: 1
BACK PAIN: 1
COLOR CHANGE: 0
SORE THROAT: 0
ABDOMINAL DISTENTION: 1
COUGH: 0
ALLERGIC/IMMUNOLOGIC NEGATIVE: 1
BLOOD IN STOOL: 1
VOMITING: 0
CONSTIPATION: 1
EYES NEGATIVE: 1

## 2021-02-08 ASSESSMENT — CROHNS DISEASE ACTIVITY INDEX (CDAI): CDAI SCORE: 0

## 2021-02-08 ASSESSMENT — PATIENT HEALTH QUESTIONNAIRE - PHQ9
SUM OF ALL RESPONSES TO PHQ QUESTIONS 1-9: 0
SUM OF ALL RESPONSES TO PHQ QUESTIONS 1-9: 0

## 2021-02-08 NOTE — PROGRESS NOTES
PUFF INTO THE LUNGS EVERY 12 HOURS 1 Inhaler 11    acetaminophen (APAP EXTRA STRENGTH) 500 MG tablet Take 500 mg by mouth every 6 hours as needed for Pain.  Loratadine (CLARITIN) 10 MG CAPS Take  by mouth.  Nebulizers (COMPRESSOR/NEBULIZER) MISC 1 each by Does not apply route 4 times daily for 14 days Use with medications as directed 1 each 0     No current facility-administered medications for this visit. Allergies   Allergen Reactions    Clindamycin/Lincomycin     Codeine     Dye [Iodides]     Iodine     Levofloxacin     Levofloxacin     Pcn [Penicillins]     Pollen Extract Itching    Proair Respiclick [Albuterol Sulfate]      Per patient \"allergic to the propellant\"    Sulfa Antibiotics     Sulfites          HPI:     Abdominal Pain  This is a new problem. The current episode started in the past 7 days (Friday (4 days)). The problem occurs constantly. The problem has been gradually worsening. The pain is located in the suprapubic region and LLQ. The pain is at a severity of 4/10 (4-9/10). The quality of the pain is cramping and a sensation of fullness (spasms ). Associated symptoms include anorexia, constipation (started today), frequency, hematochezia (maroon type stool) and nausea. Pertinent negatives include no arthralgias, diarrhea, dysuria, fever, hematuria, melena, myalgias or vomiting (dry heaves ). Nothing aggravates the pain. The pain is relieved by nothing. Treatments tried: water, heat. The treatment provided mild relief. There is no history of abdominal surgery, colon cancer, Crohn's disease, gallstones, GERD, irritable bowel syndrome, pancreatitis, PUD or ulcerative colitis. H/o diverticulosis      Hasn't eaten since yesterday  Able to tolerate PO fluids   Had a 'head cold' yesterday which resolved historically     Health Maintenance:      Subjective:     Review of Systems   Constitutional: Positive for appetite change (decreased ), chills and fatigue.  Negative for diaphoresis and fever. HENT: Negative. Negative for congestion, ear pain and sore throat. Eyes: Negative. Respiratory: Negative. Negative for cough and shortness of breath. Cardiovascular: Positive for palpitations. Gastrointestinal: Positive for abdominal distention, abdominal pain, anorexia, blood in stool (spots in stool since yesterday, rust colored ), constipation (started today), hematochezia (maroon type stool) and nausea. Negative for anal bleeding, diarrhea, melena and vomiting (dry heaves ). Endocrine: Negative. Genitourinary: Positive for frequency. Negative for difficulty urinating, dysuria and hematuria. Musculoskeletal: Positive for back pain. Negative for arthralgias and myalgias. Skin: Negative. Negative for color change, pallor, rash and wound. Allergic/Immunologic: Negative. Neurological: Positive for weakness (generalized ). Negative for seizures, syncope and facial asymmetry. Hematological: Negative. Psychiatric/Behavioral: Negative. Objective:     Vitals:    02/08/21 1332   BP: 136/82   Pulse: 110   Temp: 97.7 °F (36.5 °C)   SpO2: 97%           Physical Exam  Constitutional:       General: She is not in acute distress. Appearance: She is well-developed. She is ill-appearing. She is not toxic-appearing or diaphoretic. HENT:      Head: Normocephalic and atraumatic. Right Ear: External ear normal.      Left Ear: External ear normal.      Nose: Nose normal.   Eyes:      General: No scleral icterus. Right eye: No discharge. Left eye: No discharge. Conjunctiva/sclera: Conjunctivae normal.      Pupils: Pupils are equal, round, and reactive to light. Neck:      Musculoskeletal: Normal range of motion and neck supple. Trachea: No tracheal deviation. Cardiovascular:      Rate and Rhythm: Regular rhythm. Tachycardia present. Heart sounds: Normal heart sounds. No murmur. No friction rub. No gallop.     Pulmonary: Effort: Pulmonary effort is normal. No tachypnea, accessory muscle usage or respiratory distress. Breath sounds: Normal breath sounds. No stridor. No decreased breath sounds, wheezing, rhonchi or rales. Abdominal:      General: Abdomen is flat. Bowel sounds are decreased. There is abdominal bruit. There is no distension. Palpations: Abdomen is soft. Tenderness: There is abdominal tenderness in the left upper quadrant and left lower quadrant. There is no right CVA tenderness or left CVA tenderness. Genitourinary:     Rectum: Guaiac result negative. External hemorrhoid present. Musculoskeletal: Normal range of motion. General: No tenderness or deformity. Skin:     General: Skin is warm and dry. Coloration: Skin is not pale. Findings: No erythema or rash. Neurological:      Mental Status: She is alert and oriented to person, place, and time. GCS: GCS eye subscore is 4. GCS verbal subscore is 5. GCS motor subscore is 6. Sensory: No sensory deficit. Gait: Gait normal.   Psychiatric:         Speech: Speech normal.         Behavior: Behavior normal.         Thought Content: Thought content normal.         Judgment: Judgment normal.           Assessment:         1. Left lower quadrant abdominal pain    2. Nausea    3. Urinary frequency    4. Tachycardia    5. Acute diverticulitis    6. Pneumonia of right lower lobe due to infectious organism    7. Hyponatremia        Plan:     1. Left lower quadrant abdominal pain    - CBC Auto Differential; Future  - Comprehensive Metabolic Panel; Future  - Lipase; Future  - Amylase; Future  - CT ABDOMEN PELVIS WO CONTRAST; Future  - Urinalysis With Microscopic; Future  - Culture, Urine; Future  - Lactic Acid, Plasma; Future  - Blood Occult Stool #1; Future    STAT testing as ordered to r/o acute abdomen  ER precautions discussed    Follow-up pending results     2. Nausea    - CT ABDOMEN PELVIS WO CONTRAST; Future    3.  Urinary frequency    - CT ABDOMEN PELVIS WO CONTRAST; Future  - Urinalysis With Microscopic; Future  - Culture, Urine; Future    4. Tachycardia    - CBC Auto Differential; Future  - Comprehensive Metabolic Panel; Future  - Lactic Acid, Plasma; Future    Likely secondary to infectious process  Testing as ordered above     5. Acute diverticulitis    - metroNIDAZOLE (FLAGYL) 500 MG tablet; Take 1 tablet by mouth 3 times daily for 10 days  Dispense: 30 tablet; Refill: 0  - ciprofloxacin (CIPRO) 500 MG tablet; Take 1 tablet by mouth 2 times daily for 10 days  Dispense: 20 tablet; Refill: 0    Spoke with patient personally regarding CT scan results  TX with Cipro/Flagyl which she has tolerated before  F/u with DD in 2-5 days  ER if worsened pain, vomiting, inability to tolerate PO intake, or fevers  GI follow-up in 1 month for colonoscopy     6. Pneumonia of right lower lobe due to infectious organism    - ciprofloxacin (CIPRO) 500 MG tablet; Take 1 tablet by mouth 2 times daily for 10 days  Dispense: 20 tablet; Refill: 0    Viral vs bacterial  URI symptoms resolved     7. Hyponatremia     Secondary to above  ER if worsened, PCP follow-up within 1 week     Discussed use, benefit, and side effects of prescribed medications. All patient questions answered. Pt voiced understanding. Reviewed health maintenance. Instructed to continue current medications, diet and exercise. Patient agreedwith treatment plan. Follow up as directed.      Electronically signed by EDUARDO Davis CNP on 2/8/2021

## 2021-02-09 ENCOUNTER — TELEPHONE (OUTPATIENT)
Dept: FAMILY MEDICINE CLINIC | Age: 61
End: 2021-02-09

## 2021-02-09 LAB
CULTURE: NORMAL
Lab: NORMAL
SPECIMEN DESCRIPTION: NORMAL

## 2021-02-12 ENCOUNTER — TELEPHONE (OUTPATIENT)
Dept: FAMILY MEDICINE CLINIC | Age: 61
End: 2021-02-12

## 2021-02-12 NOTE — TELEPHONE ENCOUNTER
Finish the antibioitcs    Push fluids. She mus t be drinking so much that she is peeing a lot    Any fevers? How is the belly pain?

## 2021-02-12 NOTE — TELEPHONE ENCOUNTER
Patient taking cipro flagyl for pneumonia and diverticulitis. Patient states she has been feeling \"whoozie\" and \"washed out\". She checked her BP and it was bp 105/62 then upon standing it was 92/64. After walking around a bit it was 101/70 and finally 118/75 just before she called us.

## 2021-02-15 ENCOUNTER — OFFICE VISIT (OUTPATIENT)
Dept: FAMILY MEDICINE CLINIC | Age: 61
End: 2021-02-15
Payer: COMMERCIAL

## 2021-02-15 VITALS
WEIGHT: 161.5 LBS | DIASTOLIC BLOOD PRESSURE: 74 MMHG | OXYGEN SATURATION: 98 % | BODY MASS INDEX: 27.72 KG/M2 | TEMPERATURE: 97.2 F | HEART RATE: 102 BPM | SYSTOLIC BLOOD PRESSURE: 110 MMHG

## 2021-02-15 DIAGNOSIS — I10 ESSENTIAL HYPERTENSION: ICD-10-CM

## 2021-02-15 DIAGNOSIS — K57.92 ACUTE DIVERTICULITIS: Primary | ICD-10-CM

## 2021-02-15 PROCEDURE — 99213 OFFICE O/P EST LOW 20 MIN: CPT | Performed by: FAMILY MEDICINE

## 2021-02-15 ASSESSMENT — PATIENT HEALTH QUESTIONNAIRE - PHQ9
SUM OF ALL RESPONSES TO PHQ QUESTIONS 1-9: 0
SUM OF ALL RESPONSES TO PHQ9 QUESTIONS 1 & 2: 0
1. LITTLE INTEREST OR PLEASURE IN DOING THINGS: 0
2. FEELING DOWN, DEPRESSED OR HOPELESS: 0

## 2021-02-15 NOTE — PROGRESS NOTES
Subjective:  Esteban Bowers presents for   Chief Complaint   Patient presents with    Diverticulitis    Hypertension    Asthma   belly feels fine now    Has an appt with gi for FU next month    No sob      doing well    tolerating the meds    Patient Active Problem List   Diagnosis    Right sided sciatica    Asthma    Hypertension    Abnormal drug screen    Acute diverticulitis    Pneumonia of right lower lobe due to infectious organism    Hyponatremia         Review of Systems:  · General: no significant weight changes. · Respiratory: no cough, pleuritic chest pain, dyspnea, or wheezing  · Cardiovascular: no pain, MCGRATH, orthopnea, palpitations or claudication  · Gastrointestinal: no chronic nausea, vomiting, heartburn, diarrhea, constipation, bloating, or abdominal pain. No bloody or black stools. Objective:  Physical Exam   Vitals:   Vitals:    02/15/21 0936   BP: 110/74   Pulse: 102   Temp: 97.2 °F (36.2 °C)   TempSrc: Temporal   SpO2: 98%   Weight: 161 lb 8 oz (73.3 kg)     Wt Readings from Last 3 Encounters:   02/15/21 161 lb 8 oz (73.3 kg)   12/03/20 165 lb (74.8 kg)   05/29/20 159 lb (72.1 kg)     Ht Readings from Last 3 Encounters:   12/03/20 5' 4\" (1.626 m)   05/29/20 5' 4\" (1.626 m)   12/06/19 5' 4\" (1.626 m)     Body mass index is 27.72 kg/m². Constitutional: She is oriented to person, place, and time. She appears well-developed and well-nourished and in no acute distress. Answers all my questions appropriately. Head: Normocephalic and atraumatic. Eyes:conjunctiva appear normal.  Nose: pink, non-edematous mucosa. No polyps. No septal deviation  Throat: no erythema, tonsillar hypertrophy or exudate. No ulcerations noted. Lips/Teeth/Gums all appear normal.  Neck: Normal range of motion. Neck supple. No tracheal deviation present. No abnormal lymphadenopathy. No JVD noted. Carotids are clear bilaterally. No thyroid masses noted. Heart: RRR without murmur. No S3, S4, or gallop noted. Chest: Clear to auscultation bilaterally. Good breath sounds noted. No rales, wheezes, or rhonchi noted. No respiratory retractions noted. Wall has symmetrical movement with respirations. Abdomen: No distension noted.  + bowel sounds in all quadrants which are normoactive. No bruits noted. No masses could be palpated. No unusual pulsatile masses noted. To deep palpation, patient denied any significant pain. No rebound, guarding or rigidity noted to my exam.        Assessment:   No diagnosis found. Encounter Diagnoses   Name Primary?  Acute diverticulitis Yes    Essential hypertension        Plan:   There are no discontinued medications. THE ABOVE NOTED DISCONTINUED MEDS MAY ONLY BE FROM 'CLEANING UP' THE MED LIST AND WERE NOT ACTUALLY CANCELED;  SEE CHART FOR DETAILS! No orders of the defined types were placed in this encounter. No orders of the defined types were placed in this encounter. No follow-ups on file. There are no Patient Instructions on file for this visit.   Data Unavailable        Push fluids    Finish the antibitiocs

## 2021-02-18 ENCOUNTER — TELEPHONE (OUTPATIENT)
Dept: FAMILY MEDICINE CLINIC | Age: 61
End: 2021-02-18

## 2021-02-18 NOTE — TELEPHONE ENCOUNTER
Allen Barkley is calling to request a refill on the following medication(s):    Medication Request:  Requested Prescriptions     Pending Prescriptions Disp Refills    fluticasone-salmeterol (ADVAIR) 500-50 MCG/DOSE diskus inhaler 1 Inhaler 11     Sig: Inhale 1 puff into the lungs every 12 hours       Last Visit Date (If Applicable):  9/06/2058    Next Visit Date:    6/2/2021

## 2021-02-18 NOTE — TELEPHONE ENCOUNTER
Pt was seen by you last Monday. She said it was a regular visit. She is having pain in back arms and legs. She is really tired and is having long lasting headaches. It goes away when she goes to sleep. .  She has been taking otc tylenol. She said she just feels worse as the day goes on. She just finished the cipro and has 2 more doses of flagyl.

## 2021-03-25 RX ORDER — PREDNISONE 20 MG/1
TABLET ORAL
Qty: 18 TABLET | Refills: 0 | Status: ON HOLD | OUTPATIENT
Start: 2021-03-25 | End: 2021-03-31 | Stop reason: HOSPADM

## 2021-03-25 NOTE — TELEPHONE ENCOUNTER
Patient called and asked if she can get an RX for a Prednisone burst.  She is wheezing. This has happened before and prednisone burst helped.

## 2021-03-29 ENCOUNTER — TELEPHONE (OUTPATIENT)
Dept: PULMONOLOGY | Age: 61
End: 2021-03-29

## 2021-03-29 ENCOUNTER — HOSPITAL ENCOUNTER (INPATIENT)
Age: 61
LOS: 2 days | Discharge: HOME | DRG: 202 | End: 2021-03-31
Attending: EMERGENCY MEDICINE | Admitting: INTERNAL MEDICINE
Payer: COMMERCIAL

## 2021-03-29 ENCOUNTER — APPOINTMENT (OUTPATIENT)
Dept: GENERAL RADIOLOGY | Age: 61
DRG: 202 | End: 2021-03-29
Payer: COMMERCIAL

## 2021-03-29 DIAGNOSIS — J45.41 MODERATE PERSISTENT ASTHMA WITH ACUTE EXACERBATION: Primary | ICD-10-CM

## 2021-03-29 PROBLEM — J45.901 ACUTE ASTHMA EXACERBATION: Status: ACTIVE | Noted: 2021-03-29

## 2021-03-29 LAB
ABSOLUTE EOS #: 0 K/UL (ref 0–0.4)
ABSOLUTE IMMATURE GRANULOCYTE: 0 K/UL (ref 0–0.3)
ABSOLUTE LYMPH #: 0.35 K/UL (ref 1–4.8)
ABSOLUTE MONO #: 0.17 K/UL (ref 0.1–0.8)
ADENOVIRUS PCR: NOT DETECTED
ALLEN TEST: ABNORMAL
ANION GAP SERPL CALCULATED.3IONS-SCNC: 15 MMOL/L (ref 9–17)
BASOPHILS # BLD: 0 % (ref 0–2)
BASOPHILS ABSOLUTE: 0 K/UL (ref 0–0.2)
BORDETELLA PARAPERTUSSIS: NOT DETECTED
BORDETELLA PERTUSSIS PCR: NOT DETECTED
BUN BLDV-MCNC: 13 MG/DL (ref 6–20)
BUN/CREAT BLD: ABNORMAL (ref 9–20)
CALCIUM SERPL-MCNC: 9.6 MG/DL (ref 8.6–10.4)
CARBOXYHEMOGLOBIN: 2.3 % (ref 0–5)
CHLAMYDIA PNEUMONIAE BY PCR: NOT DETECTED
CHLORIDE BLD-SCNC: 99 MMOL/L (ref 98–107)
CO2: 21 MMOL/L (ref 20–31)
CORONAVIRUS 229E PCR: NOT DETECTED
CORONAVIRUS HKU1 PCR: NOT DETECTED
CORONAVIRUS NL63 PCR: NOT DETECTED
CORONAVIRUS OC43 PCR: NOT DETECTED
CREAT SERPL-MCNC: 0.86 MG/DL (ref 0.5–0.9)
DIFFERENTIAL TYPE: ABNORMAL
EOSINOPHILS RELATIVE PERCENT: 0 % (ref 1–4)
FIO2: ABNORMAL
GFR AFRICAN AMERICAN: >60 ML/MIN
GFR NON-AFRICAN AMERICAN: >60 ML/MIN
GFR SERPL CREATININE-BSD FRML MDRD: ABNORMAL ML/MIN/{1.73_M2}
GFR SERPL CREATININE-BSD FRML MDRD: ABNORMAL ML/MIN/{1.73_M2}
GLUCOSE BLD-MCNC: 152 MG/DL (ref 70–99)
HCO3 VENOUS: 23.4 MMOL/L (ref 24–30)
HCT VFR BLD CALC: 38 % (ref 36.3–47.1)
HEMOGLOBIN: 12.7 G/DL (ref 11.9–15.1)
HUMAN METAPNEUMOVIRUS PCR: NOT DETECTED
IMMATURE GRANULOCYTES: 0 %
INFLUENZA A BY PCR: NOT DETECTED
INFLUENZA A H1 (2009) PCR: ABNORMAL
INFLUENZA A H1 PCR: ABNORMAL
INFLUENZA A H3 PCR: ABNORMAL
INFLUENZA B BY PCR: NOT DETECTED
LYMPHOCYTES # BLD: 4 % (ref 24–44)
MCH RBC QN AUTO: 31.1 PG (ref 25.2–33.5)
MCHC RBC AUTO-ENTMCNC: 33.4 G/DL (ref 28.4–34.8)
MCV RBC AUTO: 93.1 FL (ref 82.6–102.9)
METHEMOGLOBIN: ABNORMAL % (ref 0–1.5)
MODE: ABNORMAL
MONOCYTES # BLD: 2 % (ref 1–7)
MORPHOLOGY: NORMAL
MYCOPLASMA PNEUMONIAE PCR: NOT DETECTED
NEGATIVE BASE EXCESS, VEN: ABNORMAL MMOL/L (ref 0–2)
NOTIFICATION TIME: ABNORMAL
NOTIFICATION: ABNORMAL
NRBC AUTOMATED: 0 PER 100 WBC
O2 DEVICE/FLOW/%: ABNORMAL
O2 SAT, VEN: 97.6 % (ref 60–85)
OXYHEMOGLOBIN: ABNORMAL % (ref 95–98)
PARAINFLUENZA 1 PCR: NOT DETECTED
PARAINFLUENZA 2 PCR: NOT DETECTED
PARAINFLUENZA 3 PCR: NOT DETECTED
PARAINFLUENZA 4 PCR: NOT DETECTED
PATIENT TEMP: 37
PCO2, VEN, TEMP ADJ: ABNORMAL MMHG (ref 39–55)
PCO2, VEN: 35.5 (ref 39–55)
PDW BLD-RTO: 12.8 % (ref 11.8–14.4)
PEEP/CPAP: ABNORMAL
PH VENOUS: 7.43 (ref 7.32–7.42)
PH, VEN, TEMP ADJ: ABNORMAL (ref 7.32–7.42)
PLATELET # BLD: 361 K/UL (ref 138–453)
PLATELET ESTIMATE: ABNORMAL
PMV BLD AUTO: 9.2 FL (ref 8.1–13.5)
PO2, VEN, TEMP ADJ: ABNORMAL MMHG (ref 30–50)
PO2, VEN: 96 (ref 30–50)
POSITIVE BASE EXCESS, VEN: 0.1 MMOL/L (ref 0–2)
POTASSIUM SERPL-SCNC: 4.5 MMOL/L (ref 3.7–5.3)
PSV: ABNORMAL
PT. POSITION: ABNORMAL
RBC # BLD: 4.08 M/UL (ref 3.95–5.11)
RBC # BLD: ABNORMAL 10*6/UL
RESP SYNCYTIAL VIRUS PCR: NOT DETECTED
RESPIRATORY RATE: ABNORMAL
RHINO/ENTEROVIRUS PCR: DETECTED
SAMPLE SITE: ABNORMAL
SARS-COV-2, PCR: NOT DETECTED
SARS-COV-2, RAPID: NOT DETECTED
SEG NEUTROPHILS: 94 % (ref 36–66)
SEGMENTED NEUTROPHILS ABSOLUTE COUNT: 8.18 K/UL (ref 1.8–7.7)
SET RATE: ABNORMAL
SODIUM BLD-SCNC: 135 MMOL/L (ref 135–144)
SPECIMEN DESCRIPTION: ABNORMAL
SPECIMEN DESCRIPTION: NORMAL
TEXT FOR RESPIRATORY: ABNORMAL
TOTAL HB: ABNORMAL G/DL (ref 12–16)
TOTAL RATE: ABNORMAL
TROPONIN INTERP: NORMAL
TROPONIN T: NORMAL NG/ML
TROPONIN, HIGH SENSITIVITY: <6 NG/L (ref 0–14)
VT: ABNORMAL
WBC # BLD: 8.7 K/UL (ref 3.5–11.3)
WBC # BLD: ABNORMAL 10*3/UL

## 2021-03-29 PROCEDURE — 85025 COMPLETE CBC W/AUTO DIFF WBC: CPT

## 2021-03-29 PROCEDURE — 36415 COLL VENOUS BLD VENIPUNCTURE: CPT

## 2021-03-29 PROCEDURE — 6370000000 HC RX 637 (ALT 250 FOR IP): Performed by: STUDENT IN AN ORGANIZED HEALTH CARE EDUCATION/TRAINING PROGRAM

## 2021-03-29 PROCEDURE — 96365 THER/PROPH/DIAG IV INF INIT: CPT

## 2021-03-29 PROCEDURE — 2580000003 HC RX 258: Performed by: STUDENT IN AN ORGANIZED HEALTH CARE EDUCATION/TRAINING PROGRAM

## 2021-03-29 PROCEDURE — 93005 ELECTROCARDIOGRAM TRACING: CPT | Performed by: STUDENT IN AN ORGANIZED HEALTH CARE EDUCATION/TRAINING PROGRAM

## 2021-03-29 PROCEDURE — 82805 BLOOD GASES W/O2 SATURATION: CPT

## 2021-03-29 PROCEDURE — 94640 AIRWAY INHALATION TREATMENT: CPT

## 2021-03-29 PROCEDURE — 1200000000 HC SEMI PRIVATE

## 2021-03-29 PROCEDURE — 6360000002 HC RX W HCPCS: Performed by: STUDENT IN AN ORGANIZED HEALTH CARE EDUCATION/TRAINING PROGRAM

## 2021-03-29 PROCEDURE — 99284 EMERGENCY DEPT VISIT MOD MDM: CPT

## 2021-03-29 PROCEDURE — 94761 N-INVAS EAR/PLS OXIMETRY MLT: CPT

## 2021-03-29 PROCEDURE — 2700000000 HC OXYGEN THERAPY PER DAY

## 2021-03-29 PROCEDURE — 0202U NFCT DS 22 TRGT SARS-COV-2: CPT

## 2021-03-29 PROCEDURE — 84484 ASSAY OF TROPONIN QUANT: CPT

## 2021-03-29 PROCEDURE — 87635 SARS-COV-2 COVID-19 AMP PRB: CPT

## 2021-03-29 PROCEDURE — 71045 X-RAY EXAM CHEST 1 VIEW: CPT

## 2021-03-29 PROCEDURE — 99223 1ST HOSP IP/OBS HIGH 75: CPT | Performed by: INTERNAL MEDICINE

## 2021-03-29 PROCEDURE — 80048 BASIC METABOLIC PNL TOTAL CA: CPT

## 2021-03-29 RX ORDER — IPRATROPIUM BROMIDE AND ALBUTEROL SULFATE 2.5; .5 MG/3ML; MG/3ML
1 SOLUTION RESPIRATORY (INHALATION)
Status: DISCONTINUED | OUTPATIENT
Start: 2021-03-29 | End: 2021-03-29

## 2021-03-29 RX ORDER — METHYLPREDNISOLONE SODIUM SUCCINATE 125 MG/2ML
60 INJECTION, POWDER, LYOPHILIZED, FOR SOLUTION INTRAMUSCULAR; INTRAVENOUS EVERY 6 HOURS
Status: DISCONTINUED | OUTPATIENT
Start: 2021-03-29 | End: 2021-03-31

## 2021-03-29 RX ORDER — HYDROCHLOROTHIAZIDE 25 MG/1
25 TABLET ORAL DAILY
Status: DISCONTINUED | OUTPATIENT
Start: 2021-03-29 | End: 2021-03-31 | Stop reason: HOSPADM

## 2021-03-29 RX ORDER — FAMOTIDINE 20 MG/1
20 TABLET, FILM COATED ORAL 2 TIMES DAILY
Status: DISCONTINUED | OUTPATIENT
Start: 2021-03-29 | End: 2021-03-31 | Stop reason: HOSPADM

## 2021-03-29 RX ORDER — POTASSIUM CHLORIDE 7.45 MG/ML
10 INJECTION INTRAVENOUS PRN
Status: DISCONTINUED | OUTPATIENT
Start: 2021-03-29 | End: 2021-03-31

## 2021-03-29 RX ORDER — DOXYCYCLINE HYCLATE 100 MG
100 TABLET ORAL EVERY 12 HOURS SCHEDULED
Status: DISCONTINUED | OUTPATIENT
Start: 2021-03-29 | End: 2021-03-31 | Stop reason: HOSPADM

## 2021-03-29 RX ORDER — ENALAPRIL MALEATE 10 MG/1
10 TABLET ORAL DAILY
Status: DISCONTINUED | OUTPATIENT
Start: 2021-03-29 | End: 2021-03-31 | Stop reason: HOSPADM

## 2021-03-29 RX ORDER — ALBUTEROL SULFATE 2.5 MG/3ML
2.5 SOLUTION RESPIRATORY (INHALATION)
Status: DISCONTINUED | OUTPATIENT
Start: 2021-03-29 | End: 2021-03-31 | Stop reason: HOSPADM

## 2021-03-29 RX ORDER — MONTELUKAST SODIUM 10 MG/1
10 TABLET ORAL NIGHTLY
Status: DISCONTINUED | OUTPATIENT
Start: 2021-03-29 | End: 2021-03-31 | Stop reason: HOSPADM

## 2021-03-29 RX ORDER — FLUTICASONE PROPIONATE 50 MCG
2 SPRAY, SUSPENSION (ML) NASAL DAILY
Status: DISCONTINUED | OUTPATIENT
Start: 2021-03-30 | End: 2021-03-30

## 2021-03-29 RX ORDER — ONDANSETRON 2 MG/ML
4 INJECTION INTRAMUSCULAR; INTRAVENOUS EVERY 6 HOURS PRN
Status: DISCONTINUED | OUTPATIENT
Start: 2021-03-29 | End: 2021-03-31 | Stop reason: HOSPADM

## 2021-03-29 RX ORDER — SODIUM CHLORIDE 0.9 % (FLUSH) 0.9 %
10 SYRINGE (ML) INJECTION EVERY 12 HOURS SCHEDULED
Status: DISCONTINUED | OUTPATIENT
Start: 2021-03-29 | End: 2021-03-31 | Stop reason: HOSPADM

## 2021-03-29 RX ORDER — BUDESONIDE AND FORMOTEROL FUMARATE DIHYDRATE 160; 4.5 UG/1; UG/1
2 AEROSOL RESPIRATORY (INHALATION) 2 TIMES DAILY
Status: DISCONTINUED | OUTPATIENT
Start: 2021-03-29 | End: 2021-03-31 | Stop reason: HOSPADM

## 2021-03-29 RX ORDER — PROMETHAZINE HYDROCHLORIDE 12.5 MG/1
12.5 TABLET ORAL EVERY 6 HOURS PRN
Status: DISCONTINUED | OUTPATIENT
Start: 2021-03-29 | End: 2021-03-31 | Stop reason: HOSPADM

## 2021-03-29 RX ORDER — CETIRIZINE HYDROCHLORIDE 10 MG/1
10 TABLET ORAL DAILY
Status: DISCONTINUED | OUTPATIENT
Start: 2021-03-29 | End: 2021-03-31 | Stop reason: HOSPADM

## 2021-03-29 RX ORDER — GUAIFENESIN 100 MG/5ML
200 SOLUTION ORAL EVERY 4 HOURS PRN
COMMUNITY

## 2021-03-29 RX ORDER — POTASSIUM CHLORIDE 20 MEQ/1
40 TABLET, EXTENDED RELEASE ORAL PRN
Status: DISCONTINUED | OUTPATIENT
Start: 2021-03-29 | End: 2021-03-31

## 2021-03-29 RX ORDER — FLUTICASONE PROPIONATE 50 MCG
1 SPRAY, SUSPENSION (ML) NASAL DAILY
Status: DISCONTINUED | OUTPATIENT
Start: 2021-03-29 | End: 2021-03-29

## 2021-03-29 RX ORDER — IPRATROPIUM BROMIDE AND ALBUTEROL SULFATE 2.5; .5 MG/3ML; MG/3ML
1 SOLUTION RESPIRATORY (INHALATION) 4 TIMES DAILY
Status: DISCONTINUED | OUTPATIENT
Start: 2021-03-29 | End: 2021-03-31

## 2021-03-29 RX ORDER — ACETAMINOPHEN 325 MG/1
650 TABLET ORAL EVERY 6 HOURS PRN
Status: DISCONTINUED | OUTPATIENT
Start: 2021-03-29 | End: 2021-03-31 | Stop reason: HOSPADM

## 2021-03-29 RX ORDER — POLYETHYLENE GLYCOL 3350 17 G/17G
17 POWDER, FOR SOLUTION ORAL DAILY PRN
Status: DISCONTINUED | OUTPATIENT
Start: 2021-03-29 | End: 2021-03-31 | Stop reason: HOSPADM

## 2021-03-29 RX ORDER — ENALAPRIL MALEATE AND HYDROCHLOROTHIAZIDE 10; 25 MG/1; MG/1
1 TABLET ORAL DAILY
Status: DISCONTINUED | OUTPATIENT
Start: 2021-03-29 | End: 2021-03-29

## 2021-03-29 RX ORDER — ACETAMINOPHEN 650 MG/1
650 SUPPOSITORY RECTAL EVERY 6 HOURS PRN
Status: DISCONTINUED | OUTPATIENT
Start: 2021-03-29 | End: 2021-03-31 | Stop reason: HOSPADM

## 2021-03-29 RX ORDER — MAGNESIUM SULFATE IN WATER 40 MG/ML
2000 INJECTION, SOLUTION INTRAVENOUS ONCE
Status: COMPLETED | OUTPATIENT
Start: 2021-03-29 | End: 2021-03-29

## 2021-03-29 RX ORDER — SODIUM CHLORIDE 9 MG/ML
25 INJECTION, SOLUTION INTRAVENOUS PRN
Status: DISCONTINUED | OUTPATIENT
Start: 2021-03-29 | End: 2021-03-31 | Stop reason: HOSPADM

## 2021-03-29 RX ORDER — SODIUM CHLORIDE 0.9 % (FLUSH) 0.9 %
10 SYRINGE (ML) INJECTION PRN
Status: DISCONTINUED | OUTPATIENT
Start: 2021-03-29 | End: 2021-03-31 | Stop reason: HOSPADM

## 2021-03-29 RX ADMIN — FLUTICASONE PROPIONATE 1 SPRAY: 50 SPRAY, METERED NASAL at 15:57

## 2021-03-29 RX ADMIN — FAMOTIDINE 20 MG: 20 TABLET, FILM COATED ORAL at 21:34

## 2021-03-29 RX ADMIN — METHYLPREDNISOLONE SODIUM SUCCINATE 60 MG: 125 INJECTION, POWDER, FOR SOLUTION INTRAMUSCULAR; INTRAVENOUS at 21:34

## 2021-03-29 RX ADMIN — IPRATROPIUM BROMIDE AND ALBUTEROL SULFATE 1 AMPULE: .5; 3 SOLUTION RESPIRATORY (INHALATION) at 15:49

## 2021-03-29 RX ADMIN — ACETAMINOPHEN 650 MG: 325 TABLET ORAL at 21:38

## 2021-03-29 RX ADMIN — IPRATROPIUM BROMIDE AND ALBUTEROL SULFATE 1 AMPULE: .5; 3 SOLUTION RESPIRATORY (INHALATION) at 21:09

## 2021-03-29 RX ADMIN — MAGNESIUM SULFATE 2000 MG: 2 INJECTION INTRAVENOUS at 12:17

## 2021-03-29 RX ADMIN — BUDESONIDE AND FORMOTEROL FUMARATE DIHYDRATE 2 PUFF: 160; 4.5 AEROSOL RESPIRATORY (INHALATION) at 21:10

## 2021-03-29 RX ADMIN — DOXYCYCLINE HYCLATE 100 MG: 100 TABLET, COATED ORAL at 21:34

## 2021-03-29 RX ADMIN — SODIUM CHLORIDE, PRESERVATIVE FREE 10 ML: 5 INJECTION INTRAVENOUS at 15:57

## 2021-03-29 RX ADMIN — SODIUM CHLORIDE, PRESERVATIVE FREE 10 ML: 5 INJECTION INTRAVENOUS at 21:34

## 2021-03-29 RX ADMIN — MONTELUKAST SODIUM 10 MG: 10 TABLET, FILM COATED ORAL at 21:33

## 2021-03-29 ASSESSMENT — ENCOUNTER SYMPTOMS
DIARRHEA: 0
COUGH: 1
VOMITING: 0
NAUSEA: 0
ABDOMINAL PAIN: 0
CONSTIPATION: 0
SORE THROAT: 0
BACK PAIN: 0
SHORTNESS OF BREATH: 1

## 2021-03-29 ASSESSMENT — PAIN DESCRIPTION - LOCATION: LOCATION: HEAD

## 2021-03-29 ASSESSMENT — PAIN DESCRIPTION - DESCRIPTORS: DESCRIPTORS: HEADACHE

## 2021-03-29 ASSESSMENT — PAIN DESCRIPTION - PAIN TYPE: TYPE: ACUTE PAIN

## 2021-03-29 ASSESSMENT — PAIN SCALES - GENERAL
PAINLEVEL_OUTOF10: 4
PAINLEVEL_OUTOF10: 2

## 2021-03-29 ASSESSMENT — PAIN DESCRIPTION - FREQUENCY: FREQUENCY: INTERMITTENT

## 2021-03-29 NOTE — ED TRIAGE NOTES
Talking full sentences, said \"92\" O2 sat is better, it was 88 there before treatment and solumedrol. No resp distress noted.  Cough is present occasional, pt has cough ongoing with asthma per pt

## 2021-03-29 NOTE — H&P
[address] [facility]     Department of Internal Medicine - Staff Internal Medicine Teaching Service          ADMISSION NOTE/HISTORY AND PHYSICAL EXAMINATION   Date: 3/29/2021  Patient Name: Demetria Long  Date of admission: 3/29/2021 11:52 AM  YOB: 1960  PCP: Pk Harris MD  History Obtained From:  patient    CHIEF COMPLAINT     Chief complaint: sob     HISTORY OF PRESENTING ILLNESS     The patient is a pleasant 62 y.o. female presents with past medical history of asthma, hypertension. Patient with above past medical history presented with shortness of breath . According to patient symptoms started couple of days ago and had nasal congestion initially which then converted to chest congestion. Patient follows with Dr. Kaylee Carbone for asthma and called the office on March 25 and got prednisone 60 mg 3 times daily in  tapering dosing . however her symptoms does not improved 3 days into taking the prednisone . Patient patient visited the urgent care which she received IM Solu-Medrol and breathing treatment, chest x-ray. Patient was referred to [facility] ED because she was hypoxic at around 90%.     In the emergency department of [facility] patient was found to have  pH 7.43, PCO2 35.5, bicarbonate 23.4  Serum electrolytes unremarkable, CBC unremarkable  Troponin <6 , Covid negative  Rhino virus Positive  CXR was not showing any infiltrate or  acute process     Review of Systems:  General ROS: Completed and except as mentioned above were negative   HEENT ROS: Completed and except as mentioned above were negative   Allergy and Immunology ROS:  Completed and except as mentioned above were negative  Hematological and Lymphatic ROS:  Completed and except as mentioned above were negative  Respiratory ROS:  Completed and except as mentioned above were negative  Cardiovascular ROS:  Completed and except as mentioned above were negative  Gastrointestinal ROS: Completed and except as mentioned above were negative  Genito-Urinary ROS:  Completed and except as mentioned above were negative  Musculoskeletal ROS:  Completed and except as mentioned above were negative  Neurological ROS:  Completed and except as mentioned above were negative  Skin & Dermatological ROS:  Completed and except as mentioned above were negative  Psychological ROS:  Completed and except as mentioned above were negative    PAST MEDICAL HISTORY     Past Medical History:   Diagnosis Date    COVID-19 virus antibody negative 08/11/2020    negative    Essential hypertension     Former smoker     Gastroesophageal reflux disease     Moderate persistent asthma without complication     Post-nasal drip        PAST SURGICAL HISTORY     History reviewed. No pertinent surgical history. ALLERGIES     Clindamycin/lincomycin, Codeine, Dye [iodides], Iodine, Levofloxacin, Levofloxacin, Pcn [penicillins], Pollen extract, Proair respiclick [albuterol sulfate], Sulfa antibiotics, and Sulfites    MEDICATIONS PRIOR TO ADMISSION     Prior to Admission medications    Medication Sig Start Date End Date Taking?  Authorizing Provider   guaiFENesin (ROBITUSSIN) 100 MG/5ML SOLN oral solution Take 200 mg by mouth every 4 hours as needed for Cough   Yes Historical Provider, MD   predniSONE (DELTASONE) 20 MG tablet 3 tabs x 3 days, then 2 tabs x 3 days, then 1 tab x 3 days 3/25/21 4/4/21  Darlene John MD   fluticasone-salmeterol (ADVAIR) 500-50 MCG/DOSE diskus inhaler Inhale 1 puff into the lungs every 12 hours 2/18/21   Khadar Huntley MD   albuterol sulfate HFA (VENTOLIN HFA) 108 (90 Base) MCG/ACT inhaler inhale 2 puffs by mouth every 6 hours if needed for wheezing 1/4/21   Darlene John MD   enalapril-hydroCHLOROthiazide (VASERETIC) 10-25 MG per tablet take 1 tablet by mouth once daily 12/3/20   Khadar Huntley MD   montelukast (SINGULAIR) 10 MG tablet Take 1 tablet by mouth nightly take 1 tablet by mouth every evening 11/2/20   Sincere Marino Fela Teran MD   fluticasone (FLONASE) 50 MCG/ACT nasal spray 1 spray by Each Nostril route daily    Historical Provider, MD   Multiple Vitamins-Minerals (THERAPEUTIC MULTIVITAMIN-MINERALS) tablet Take 1 tablet by mouth daily    Historical Provider, MD   albuterol (PROVENTIL) (2.5 MG/3ML) 0.083% nebulizer solution Take 3 mLs by nebulization every 6 hours as needed for Wheezing 3/24/20   Radha Matthew, APRN - CNP   Nebulizers (COMPRESSOR/NEBULIZER) MISC 1 each by Does not apply route 4 times daily for 14 days Use with medications as directed 2/26/19 3/12/19  Sriram Case, MD   acetaminophen (APAP EXTRA STRENGTH) 500 MG tablet Take 500 mg by mouth every 6 hours as needed for Pain. Historical Provider, MD   Loratadine (CLARITIN) 10 MG CAPS Take  by mouth. Historical Provider, MD       SOCIAL HISTORY     Tobacco: yes  Occasionally   Alcohol: yes occasionally   Illicits: none       FAMILY HISTORY     History reviewed. No pertinent family history. PHYSICAL EXAM     Vitals: BP (!) 140/80   Pulse 99   Temp 97.3 °F (36.3 °C) (Oral)   Resp 20   Ht 5' 6\" (1.676 m)   Wt 149 lb 11.2 oz (67.9 kg)   SpO2 90%   BMI 24.16 kg/m²   Tmax: Temp (24hrs), Av.6 °F (36.4 °C), Min:97.3 °F (36.3 °C), Max:97.9 °F (36.6 °C)    Last Body weight:   Wt Readings from Last 3 Encounters:   21 149 lb 11.2 oz (67.9 kg)   02/15/21 161 lb 8 oz (73.3 kg)   20 165 lb (74.8 kg)     Body Mass Index : Body mass index is 24.16 kg/m². PHYSICAL EXAMINATION:  Constitutional: This is a well developed, well nourished, 18.5-24.9 - Normal 62y.o. year old female who is alert, oriented, cooperative and in no apparent distress. Head:normocephalic and atraumatic. EENT:  PERRLA. No conjunctival injections. Septum was midline, mucosa was without erythema, exudates or cobblestoning. No thrush was noted. Neck: Supple without thyromegaly. No elevated JVP. Trachea was midline.   Respiratory:b/l wheezes   Cardiovascular: Regular without murmur, clicks, gallops or rubs. Abdomen: Slightly rounded and soft without organomegaly. No rebound, rigidity or guarding was appreciated. Lymphatic: No lymphadenopathy. Musculoskeletal: Normal curvature of the spine. No gross muscle weakness. Extremities:  No lower extremity edema, ulcerations, tenderness, varicosities or erythema. Muscle size, tone and strength are normal.  No involuntary movements are noted. Skin:  Warm and dry. Good color, turgor and pigmentation. No lesions or scars.   No cyanosis or clubbing  Neurological/Psychiatric: The patient's general behavior, level of consciousness, thought content and emotional status is normal.          INVESTIGATIONS     Laboratory Testing:     Recent Results (from the past 24 hour(s))   CBC WITH AUTO DIFFERENTIAL    Collection Time: 03/29/21 12:10 PM   Result Value Ref Range    WBC 8.7 3.5 - 11.3 k/uL    RBC 4.08 3.95 - 5.11 m/uL    Hemoglobin 12.7 11.9 - 15.1 g/dL    Hematocrit 38.0 36.3 - 47.1 %    MCV 93.1 82.6 - 102.9 fL    MCH 31.1 25.2 - 33.5 pg    MCHC 33.4 28.4 - 34.8 g/dL    RDW 12.8 11.8 - 14.4 %    Platelets 307 356 - 834 k/uL    MPV 9.2 8.1 - 13.5 fL    NRBC Automated 0.0 0.0 per 100 WBC    Differential Type NOT REPORTED     WBC Morphology NOT REPORTED     RBC Morphology NOT REPORTED     Platelet Estimate NOT REPORTED     Immature Granulocytes 0 0 %    Seg Neutrophils 94 (H) 36 - 66 %    Lymphocytes 4 (L) 24 - 44 %    Monocytes 2 1 - 7 %    Eosinophils % 0 (L) 1 - 4 %    Basophils 0 0 - 2 %    Absolute Immature Granulocyte 0.00 0.00 - 0.30 k/uL    Segs Absolute 8.18 (H) 1.8 - 7.7 k/uL    Absolute Lymph # 0.35 (L) 1.0 - 4.8 k/uL    Absolute Mono # 0.17 0.1 - 0.8 k/uL    Absolute Eos # 0.00 0.0 - 0.4 k/uL    Basophils Absolute 0.00 0.0 - 0.2 k/uL    Morphology Normal    BASIC METABOLIC PANEL    Collection Time: 03/29/21 12:10 PM   Result Value Ref Range    Glucose 152 (H) 70 - 99 mg/dL    BUN 13 6 - 20 mg/dL    CREATININE 0.86 0.50 - 0.90 mg/dL    Bun/Cre Ratio NOT REPORTED 9 - 20    Calcium 9.6 8.6 - 10.4 mg/dL    Sodium 135 135 - 144 mmol/L    Potassium 4.5 3.7 - 5.3 mmol/L    Chloride 99 98 - 107 mmol/L    CO2 21 20 - 31 mmol/L    Anion Gap 15 9 - 17 mmol/L    GFR Non-African American >60 >60 mL/min    GFR African American >60 >60 mL/min    GFR Comment          GFR Staging NOT REPORTED    Troponin    Collection Time: 03/29/21 12:10 PM   Result Value Ref Range    Troponin, High Sensitivity <6 0 - 14 ng/L    Troponin T NOT REPORTED <0.03 ng/mL    Troponin Interp NOT REPORTED    BLOOD GAS, VENOUS    Collection Time: 03/29/21 12:10 PM   Result Value Ref Range    pH, Jayden 7.435 (H) 7.320 - 7.420    pCO2, Jayden 35.5 (L) 39 - 55    pO2, Jayden 96.0 (H) 30 - 50    HCO3, Venous 23.4 (L) 24 - 30 mmol/L    Positive Base Excess, Jayden 0.1 0.0 - 2.0 mmol/L    Negative Base Excess, Jayden NOT REPORTED 0.0 - 2.0 mmol/L    O2 Sat, Jayden 97.6 (H) 60.0 - 85.0 %    Total Hb NOT REPORTED 12.0 - 16.0 g/dl    Oxyhemoglobin NOT REPORTED 95.0 - 98.0 %    Carboxyhemoglobin 2.3 0 - 5 %    Methemoglobin NOT REPORTED 0.0 - 1.5 %    Pt Temp 37.0     pH, Jayden, Temp Adj NOT REPORTED 7.320 - 7.420    pCO2, Jayden, Temp Adj NOT REPORTED 39 - 55 mmHg    pO2, Jayden, Temp Adj NOT REPORTED 30 - 50 mmHg    O2 Device/Flow/% NOT REPORTED     Respiratory Rate NOT REPORTED     Amadou Test NOT REPORTED     Sample Site NOT REPORTED     Pt. Position NOT REPORTED     Mode NOT REPORTED     Set Rate NOT REPORTED     Total Rate NOT REPORTED     VT NOT REPORTED     FIO2 INFORMATION NOT PROVIDED     Peep/Cpap NOT REPORTED     PSV NOT REPORTED     Text for Respiratory NOT REPORTED     NOTIFICATION NOT REPORTED     NOTIFICATION TIME NOT REPORTED    COVID-19, Rapid    Collection Time: 03/29/21 12:19 PM    Specimen: Nasopharyngeal Swab   Result Value Ref Range    Specimen Description . NASOPHARYNGEAL SWAB     SARS-CoV-2, Rapid Not Detected Not Detected   EKG 12 Lead    Collection Time: 03/29/21 12:23 PM   Result

## 2021-03-29 NOTE — ED NOTES
The following labs labeled with pt sticker and tubed:     [x] Lavender   [] on ice   [x] Blue   [x] Green/yellow  [x] Green/black [] on ice  [] Pink  [] Red  [] Yellow     [] COVID-19 swab    [] Rapid     [] Urine Sample  [] Pelvic Cultures    [] Blood Cultures            Primus ZAK Echevarria  03/29/21 5242

## 2021-03-29 NOTE — CONSULTS
Attending Physician Statement  I have discussed the care of Rebecca Mckoy, including pertinent history and exam findings with the resident. I have reviewed the key elements of all parts of the encounter with the resident. I have seen and examined the patient with the resident. I agree with the assessment and plan and status of the problem list as documented. I seen the patient as patient was admitted to medical floor from emergency room. She has history of asthma/history of childhood asthma apparently was good for long time and then she developed asthma, asthma severity is moderate persistent she is compliant with medication for last few years she did not have any exacerbation or admission. She denies daily use of albuterol she does have seasonal variability and usually get more symptoms when there is weather changes or spring. About a week ago she started having symptoms with cough wheezing and some chest tightness she had called the office as she is followed by Dr. Ramone Shabazz and she was given a prescription for prednisone. She started taking prednisone 03/25/2021 but according to patient she was not getting better and she got worse today in the emergency room when she had received multiple treatment given 125 Solu-Medrol a chest x-ray was done which did not show any acute changes she had a Covid rapid test which was negative. She is complaining of nasal congestion postnasal drip, cough mostly dry sometimes sputum production denies yellowish sputum denies fever chills denies headache myalgia or GI symptoms. Sick contact is one of the family member her niece who has nasal congestion symptoms also according to patient. Her labs shows WBC of 8.7 troponin less than 6.   Creatinine electrolytes were normal chest x-ray no infiltrate and a ABG 7.43/35 point 5/96/20 3.4  According to patient she does not have any recent exposure, she does not have any pets at home, she does have reflux but is controlled on PPI, she take Advair 500/50 at home. Started on Solu-Medrol 60 mg every 6 hours today she will get high-dose as she had failed outpatient oral prednisone. DuoNeb aerosol to continue  We will continue with Symbicort 160/405 2 puff twice daily we will continue with antihistamine which she take at home and continue with Singulair. We will check respiratory viral panel. Continue Flonase nasal spray. Discussed with nursing staff, treatment and plan discussed. Please note that this chart was generated using voice recognition Dragon dictation software. Although every effort was made to ensure the accuracy of this automated transcription, some errors in transcription may have occurred.      Anton Chin MD  3/29/2021 4:15 PM

## 2021-03-29 NOTE — PLAN OF CARE
BRONCHOSPASM/BRONCHOCONSTRICTION     [x]         IMPROVE AERATION/BREATH SOUNDS  [x]   ADMINISTER BRONCHODILATOR THERAPY AS APPROPRIATE  [x]   ASSESS BREATH SOUNDS  [x]   IMPLEMENT AEROSOL/MDI PROTOCOL  PROVIDE ADEQUATE OXYGENATION WITH ACCEPTABLE SP02/ABG'S    [x]  IDENTIFY APPROPRIATE OXYGEN THERAPY  [x]   MONITOR SP02/ABG'S AS NEEDED   SHYANNE BRYANT, PPatient Assessment complete. Acute asthma exacerbation [J45.901] . Vitals:    03/29/21 1553   BP:    Pulse:    Resp:    Temp:    SpO2: 90%   . Patients home meds are   Prior to Admission medications    Medication Sig Start Date End Date Taking?  Authorizing Provider   guaiFENesin (ROBITUSSIN) 100 MG/5ML SOLN oral solution Take 200 mg by mouth every 4 hours as needed for Cough   Yes Historical Provider, MD   predniSONE (DELTASONE) 20 MG tablet 3 tabs x 3 days, then 2 tabs x 3 days, then 1 tab x 3 days 3/25/21 4/4/21  Florin Morse MD   fluticasone-salmeterol (ADVAIR) 500-50 MCG/DOSE diskus inhaler Inhale 1 puff into the lungs every 12 hours 2/18/21   Rocio Isidro MD   albuterol sulfate HFA (VENTOLIN HFA) 108 (90 Base) MCG/ACT inhaler inhale 2 puffs by mouth every 6 hours if needed for wheezing 1/4/21   Florin Morse MD   enalapril-hydroCHLOROthiazide (VASERETIC) 10-25 MG per tablet take 1 tablet by mouth once daily 12/3/20   Rocio Isidro MD   montelukast (SINGULAIR) 10 MG tablet Take 1 tablet by mouth nightly take 1 tablet by mouth every evening 11/2/20   Rocio Isidro MD   fluticasone (FLONASE) 50 MCG/ACT nasal spray 1 spray by Each Nostril route daily    Historical Provider, MD   Multiple Vitamins-Minerals (THERAPEUTIC MULTIVITAMIN-MINERALS) tablet Take 1 tablet by mouth daily    Historical Provider, MD   albuterol (PROVENTIL) (2.5 MG/3ML) 0.083% nebulizer solution Take 3 mLs by nebulization every 6 hours as needed for Wheezing 3/24/20   EDUARDO Harrell - CNP   Nebulizers (COMPRESSOR/NEBULIZER) MISC 1 each by Does not apply route 4 times daily for 14 days Use with medications as directed 2/26/19 3/12/19  Evens Millan MD   acetaminophen (APAP EXTRA STRENGTH) 500 MG tablet Take 500 mg by mouth every 6 hours as needed for Pain. Historical Provider, MD   Loratadine (CLARITIN) 10 MG CAPS Take  by mouth.     Historical Provider, MD   .  Recent Surgical History: None = 0     Assessment Aerosols as needed      RR 20  Breath Sounds: diminished, few wheezes      · Bronchodilator assessment at level  3  · Hyperinflation assessment at level   · Secretion Management assessment at level    ·   · []    Bronchodilator Assessment  BRONCHODILATOR ASSESSMENT SCORE  Score 0 1 2 3 4 5   Breath Sounds   []  Patient Baseline []  No Wheeze good aeration [x]  Faint, scattered wheezing, good aeration []  Expiratory Wheezing and or moderately diminished []  Insp/Exp wheeze and/or very diminished []  Insp/Exp and/ or marked distress   Respiratory Rate   []  Patient Baseline []  Less than 20 []  Less than 20 [x]  20-25 []  Greater than 25 []  Greater than 25   Peak flow % of Pred or PB []  NA   []  Greater than 90%  []  81-90% []  71-80% []  Less than or equal to 70%  or unable to perform []  Unable due to Respiratory Distress   Dyspnea re []  Patient Baseline []  No SOB []  No SOB [x]  SOB on exertion []  SOB min activity []  At rest/acute   e FEV% Predicted       []  NA []  Above 69%  []  Unable []  Above 60-69%  []  Unable []  Above 50-59%  [x]  Unable []  Above 35-49%  []  Unable []  Less than 35%  []  Unable                 []  Hyperinflation Assessment  Score 1 2 3   CXR and Breath Sounds   []  Clear []  No atelectasis  Basilar aeration []  Atelectasis or absent basilar breath sounds   Incentive Spirometry Volume  (Per IBW)   []  Greater than or equal to 15ml/Kg []  less than 15ml/Kg []  less than 15ml/Kg   Surgery within last 2 weeks []  None or general   []  Abdominal or thoracic surgery  []  Abdominal or thoracic   Chronic Pulmonary Historyre []  No []  Yes [] Yes     []  Secretion Management Assessment  Score 1 2 3   Bilateral Breath Sounds   []  Occasional Rhonchi []  Scattered Rhonchi []  Course Rhonchi and/or poor aeration   Sputum    []  Small amount of thin secretions []  Moderate amount of viscous secretions []  Copius, Viscious Yellow/ Secretions   CXR as reported by physician []  clear  []  Unavailable []  Infiltrates and/or consolidation  []  Unavailable []  Mucus Plugging and or lobar consolidation  []  Unavailable   Cough []  Strong, productive cough []  Weak productive cough []  No cough or weak non-productive cough   SHYANNE SLATER MANNY  3:54 PM                            FEMALE                                  MALE                            FEV1 Predicted Normal Values                        FEV1 Predicted Normal Values          Age                                     Height in Feet and Inches       Age                                     Height in Feet and Inches       4' 11\" 5' 1\" 5' 3\" 5' 5\" 5' 7\" 5' 9\" 5' 11\" 6' 1\"  4' 11\" 5' 1\" 5' 3\" 5' 5\" 5' 7\" 5' 9\" 5' 11\" 6' 1\"   42 - 45 2.49 2.66 2.84 3.03 3.22 3.42 3.62 3.83 42 - 45 2.82 3.03 3.26 3.49 3.72 3.96 4.22 4.47   46 - 49 2.40 2.57 2.76 2.94 3.14 3.33 3.54 3.75 46 - 49 2.70 2.92 3.14 3.37 3.61 3.85 4.10 4.36   50 - 53 2.31 2.48 2.66 2.85 3.04 3.24 3.45 3.66 50 - 53 2.58 2.80 3.02 3.25 3.49 3.73 3.98 4.24   54 - 57 2.21 2.38 2.57 2.75 2.95 3.14 3.35 3.56 54 - 57 2.46 2.67 2.89 3.12 3.36 3.60 3.85 4.11   58 - 61 2.10 2.28 2.46 2.65 2.84 3.04 3.24 3.45 58 - 61 2.32 2.54 2.76 2.99 3.23 3.47 3.72 3.98   62 - 65 1.99 2.17 2.35 2.54 2.73 2.93 3.13 3.34 62 - 65 2.19 2.40 2.62 2.85 3.09 3.33 3.58 3.84   66 - 69 1.88 2.05 2.23 2.42 2.61 2.81 3.02 3.23 66 - 69 2.04 2.26 2.48 2.71 2.95 3.19 3.44 3.70   70+ 1.82 1.99 2.17 2.36 2.55 2.75 2.95 3.16 70+ 1.97 2.19 2.41 2.64 2.87 3.12 3.37 3.62             Predicted Peak Expiratory Flow Rate                                       Height (in)  Female       Height (in) Male

## 2021-03-29 NOTE — TELEPHONE ENCOUNTER
Pt went to urgent care - 02 88% - she was advised to go to ER - she wanted you to know she is going to [facility]   Informed her Dr Alejandra Clements is out of the office,  IF ER calls a consult, they can contact his partner.

## 2021-03-29 NOTE — ED PROVIDER NOTES
file   Occupational History    Not on file   Social Needs    Financial resource strain: Not hard at all   [place] insecurity     Worry: Never true     Inability: Never true   [employer] needs     Medical: No     Non-medical: No   Tobacco Use    Smoking status: Former Smoker     Packs/day: 0.25     Years: 0.50     Pack years: 0.12     Quit date: 2012     Years since quittin.2    Smokeless tobacco: Never Used   Substance and Sexual Activity    Alcohol use: No    Drug use: No    Sexual activity: Yes     Partners: Male   Lifestyle    Physical activity     Days per week: Not on file     Minutes per session: Not on file    Stress: Not on file   Relationships    Social connections     Talks on phone: Not on file     Gets together: Not on file     Attends Hindu service: Not on file     Active member of club or organization: Not on file     Attends meetings of clubs or organizations: Not on file     Relationship status: Not on file    Intimate partner violence     Fear of current or ex partner: Not on file     Emotionally abused: Not on file     Physically abused: Not on file     Forced sexual activity: Not on file   Other Topics Concern    Not on file   Social History Narrative    Not on file       History reviewed. No pertinent family history. Allergies:  Clindamycin/lincomycin, Codeine, Dye [iodides], Iodine, Levofloxacin, Levofloxacin, Pcn [penicillins], Pollen extract, Proair respiclick [albuterol sulfate], Sulfa antibiotics, and Sulfites    Home Medications:  Prior to Admission medications    Medication Sig Start Date End Date Taking?  Authorizing Provider   predniSONE (DELTASONE) 20 MG tablet 3 tabs x 3 days, then 2 tabs x 3 days, then 1 tab x 3 days 3/25/21 4/4/21  Cass Avitia MD   fluticasone-salmeterol (ADVAIR) 500-50 MCG/DOSE diskus inhaler Inhale 1 puff into the lungs every 12 hours 21   William Saavedra MD   ondansetron (ZOFRAN-ODT) 4 MG disintegrating tablet Take 1 tablet by mouth every 8 hours as needed for Nausea or Vomiting 2/8/21   EDUARDO Berry CNP   albuterol sulfate HFA (VENTOLIN HFA) 108 (90 Base) MCG/ACT inhaler inhale 2 puffs by mouth every 6 hours if needed for wheezing 1/4/21   Manohar Morin MD   enalapril-hydroCHLOROthiazide (VASERETIC) 10-25 MG per tablet take 1 tablet by mouth once daily 12/3/20   Pk Harris MD   omeprazole (PRILOSEC) 20 MG delayed release capsule Take 1 capsule by mouth Daily 12/3/20 3/3/21  Pk Harris MD   montelukast (SINGULAIR) 10 MG tablet Take 1 tablet by mouth nightly take 1 tablet by mouth every evening 11/2/20   Pk Harris MD   fluticasone (FLONASE) 50 MCG/ACT nasal spray 1 spray by Each Nostril route daily    Historical Provider, MD   Multiple Vitamins-Minerals (THERAPEUTIC MULTIVITAMIN-MINERALS) tablet Take 1 tablet by mouth daily    Historical Provider, MD   albuterol (PROVENTIL) (2.5 MG/3ML) 0.083% nebulizer solution Take 3 mLs by nebulization every 6 hours as needed for Wheezing 3/24/20   EDUARDO Bacon CNP   Nebulizers (COMPRESSOR/NEBULIZER) MISC 1 each by Does not apply route 4 times daily for 14 days Use with medications as directed 2/26/19 3/12/19  Pk Harris MD   acetaminophen (APAP EXTRA STRENGTH) 500 MG tablet Take 500 mg by mouth every 6 hours as needed for Pain. Historical Provider, MD   Loratadine (CLARITIN) 10 MG CAPS Take  by mouth. Historical Provider, MD       REVIEW OFSYSTEMS    (2-9 systems for level 4, 10 or more for level 5)      Review of Systems   Constitutional: Negative for activity change, appetite change, chills, fatigue and fever. HENT: Positive for congestion. Negative for sore throat. Respiratory: Positive for cough and shortness of breath. Cardiovascular: Negative for chest pain. Gastrointestinal: Negative for abdominal pain, constipation, diarrhea, nausea and vomiting. Musculoskeletal: Negative for back pain and neck pain.    Skin: Negative for rash and wound. Neurological: Negative for dizziness, syncope, weakness, light-headedness and headaches. PHYSICAL EXAM   (up to 7 for level 4, 8 or more forlevel 5)      INITIAL VITALS:   ED Triage Vitals   BP Temp Temp Source Pulse Resp SpO2 Height Weight   03/29/21 1155 03/29/21 1137 03/29/21 1137 03/29/21 1140 03/29/21 1140 03/29/21 1140 -- 03/29/21 1155   (!) 126/94 97.9 °F (36.6 °C) Infrared 109 24 92 %  161 lb (73 kg)       Physical Exam  Vitals signs and nursing note reviewed. Constitutional:       General: She is in acute distress. Appearance: Normal appearance. She is well-developed. She is not diaphoretic. HENT:      Head: Normocephalic and atraumatic. Nose: Nose normal.   Eyes:      Conjunctiva/sclera: Conjunctivae normal.   Neck:      Musculoskeletal: Normal range of motion and neck supple. Cardiovascular:      Rate and Rhythm: Normal rate and regular rhythm. Heart sounds: Normal heart sounds. Pulmonary:      Effort: Respiratory distress present. Breath sounds: Wheezing and rhonchi present. Comments: Tachypneic  Abdominal:      General: There is no distension. Palpations: Abdomen is soft. Tenderness: There is no abdominal tenderness. There is no guarding. Musculoskeletal: Normal range of motion. General: No swelling or tenderness. Skin:     General: Skin is warm and dry. Neurological:      Mental Status: She is alert. Mental status is at baseline. Psychiatric:         Behavior: Behavior normal.         Thought Content:  Thought content normal.         DIFFERENTIAL  DIAGNOSIS     PLAN (LABS / IMAGING / EKG):  Orders Placed This Encounter   Procedures    COVID-19, Rapid    XR CHEST PORTABLE    CBC WITH AUTO DIFFERENTIAL    BASIC METABOLIC PANEL    Troponin    BLOOD GAS, VENOUS    Telemetry Monitoring    Inpatient consult to Internal Medicine    Initiate ED RT Aerosol protocol   [facility]    EKG 12 Lead    PATIENT STATUS (FROM ED OR OR/PROCEDURAL) Inpatient       MEDICATIONS ORDERED:  Orders Placed This Encounter   Medications    magnesium sulfate 2000 mg in 50 mL IVPB premix    ipratropium-albuterol (DUONEB) nebulizer solution 1 ampule           Initial MDM/Plan/ED COURSE:    62 y.o. female who presents with complaints of shortness of breath, asthma exacerbation sent from urgent care. On exam patient is talking in broken sentences, tachypneic. Vital signs remarkable for hypoxia satting approximately 90% on room air, tachypneic. Patient initially not tachycardic, however after breathing treatments did become slightly tachycardic. Cardiac regular rate and rhythm. Lungs with some expiratory wheezing and rhonchi bilaterally. No lower extremity swelling or calf tenderness. Impression is asthma exacerbation. As we cannot see labs or chest x-ray from outlying urgent care will plan for repeat labs, chest x-ray. Will give mag, ED respiratory evaluation for breathing treatments. Anticipate likely admission. Also plan for Covid swab. Low suspicion for DVT/PE as patient has no history of DVT/PE, no lower extremity swelling or calf tenderness, no chest pain, is not tachycardic on initial exam and only became tachycardic after albuterol treatment. ED Course as of Mar 29 1422   Mon Mar 29, 2021   1248 SARS-CoV-2, Rapid: Not Detected [LW]   1333 Updated patient. She states she feels slightly better but is still satting approximately 89 to 90% on room air. We will plan for admission to internal medicine.    [LW]      ED Course User Index  [LW] Kailey Kaur,      Patient admitted to internal medicine, waiting transfer to the floor.     DIAGNOSTIC RESULTS / EMERGENCYDEPARTMENT COURSE / MDM     LABS:  Labs Reviewed   CBC WITH AUTO DIFFERENTIAL - Abnormal; Notable for the following components:       Result Value    Seg Neutrophils 94 (*)     Lymphocytes 4 (*)     Eosinophils % 0 (*)     Segs Absolute 8.18 (*)     Absolute Lymph # 0.35 (*)     All other components within normal limits   BASIC METABOLIC PANEL - Abnormal; Notable for the following components:    Glucose 152 (*)     All other components within normal limits   BLOOD GAS, VENOUS - Abnormal; Notable for the following components:    pH, Jayden 7.435 (*)     pCO2, Jayden 35.5 (*)     pO2, Jayden 96.0 (*)     HCO3, Venous 23.4 (*)     O2 Sat, Jayden 97.6 (*)     All other components within normal limits   COVID-19, RAPID   TROPONIN           Xr Chest Portable    Result Date: 3/29/2021  EXAMINATION: ONE XRAY VIEW OF THE CHEST 3/29/2021 12:17 pm COMPARISON: 03/22/2014 HISTORY: ORDERING SYSTEM PROVIDED HISTORY: Cough, SOB TECHNOLOGIST PROVIDED HISTORY: Cough, SOB Reason for Exam: short of breath. hx asthma FINDINGS: The cardiomediastinal silhouette is within normal limits. There is no consolidation, pneumothorax or evidence for edema. No evidence for effusion. No acute osseous abnormality is identified. No acute airspace disease identified. PROCEDURES:  None    CONSULTS:  IP CONSULT TO INTERNAL MEDICINE  IP CONSULT TO CASE MANAGEMENT    CRITICAL CARE:  Please see attending note    FINAL IMPRESSION      1. Moderate persistent asthma with acute exacerbation          DISPOSITION / PLAN     DISPOSITION Admitted 03/29/2021 01:41:17 PM      PATIENT REFERRED TO:  No follow-up provider specified.     DISCHARGE MEDICATIONS:  New Prescriptions    No medications on file       Antwan Moreno DO  Emergency Medicine Resident    (Please note that portions of this note were completed with a voice recognition program.Efforts were made to edit the dictations but occasionally words are mis-transcribed.)        Antwan Moreno DO  Resident  03/29/21 [id number]

## 2021-03-29 NOTE — ED PROVIDER NOTES
[facility]  Emergency Department  Faculty Attestation     I performed a history and physical examination of the patient and discussed management with the resident. I reviewed the residents note and agree with the documented findings and plan of care. Any areas of disagreement are noted on the chart. I was personally present for the key portions of any procedures. I have documented in the chart those procedures where I was not present during the key portions. I have reviewed the emergency nurses triage note. I agree with the chief complaint, past medical history, past surgical history, allergies, medications, social and family history as documented unless otherwise noted below. For Physician Assistant/ Nurse Practitioner cases/documentation I have personally evaluated this patient and have completed at least one if not all key elements of the E/M (history, physical exam, and MDM). Additional findings are as noted. Primary Care Physician:  Khadar Huntley MD    Screenings:  [unfilled]    CHIEF COMPLAINT       Chief Complaint   Patient presents with    Shortness of Breath       RECENT VITALS:   Temp: 97.9 °F (36.6 °C),  Pulse: 109, Resp: 24, BP: (!) 126/94    LABS:  Labs Reviewed   BLOOD GAS, VENOUS - Abnormal; Notable for the following components:       Result Value    pH, Jayden 7.435 (*)     pCO2, Jayden 35.5 (*)     pO2, Jayden 96.0 (*)     HCO3, Venous 23.4 (*)     O2 Sat, Jayden 97.6 (*)     All other components within normal limits   COVID-19, RAPID   CBC WITH AUTO DIFFERENTIAL   BASIC METABOLIC PANEL   TROPONIN       Radiology  XR CHEST PORTABLE   Final Result   No acute airspace disease identified. CRITICAL CARE: There was a high probability of clinically significant/life threatening deterioration in this patient's condition which required my urgent intervention. Total critical care time was none minutes.   This excludes any time for separately reportable procedures. EKG:   EKG Interpretation    Interpreted by me    Rhythm: normal sinus   Rate: normal  Axis: normal  Ectopy: none  Conduction: normal  ST Segments: no acute change  T Waves: Flattening versus inversion V2  Q Waves: none    Clinical Impression: Nonspecific T wave change    Attending Physician Additional  Notes    Patient has asthma. She was seen and treated in urgent care center today for her asthma. She normally uses inhalers. She was given bronchodilators, IM Solu-Medrol, received blood work and a chest x-ray. She was prescribed doxycycline but not told it was pneumonia. She was sent here to the ED for persistent hypoxemia. Patient has been having pulse oximetry readings of 88% at home. She had a right lower lobe pneumonia seen on CT of her abdomen in February of this year that was concerning for pneumonia back then. No Covid symptoms other than fatigue and difficulty breathing. No Covid exposures. Not yet vaccinated, due for her first vaccine tomorrow. On exam she is tachycardic tachypneic saturation 91%. She has difficulty finishing sentences. She has hypertension and is afebrile. Breath sounds are diminished with expiratory prolongation but no active wheezing. No JVD. No edema cords Homans or calf tenderness. Impression is hypoxemia, asthma versus pneumonia, consider viral syndrome. Plan is chest x-ray, Covid assay, magnesium, repeat treatments, reassess, anticipate admission. Christiane Marino.  Suni Damon MD, [address] [facility],[address]  Attending Emergency  Physician                Mohini Lim MD  03/29/21 8210       Mohini Lim MD  03/29/21 2229

## 2021-03-30 ENCOUNTER — APPOINTMENT (OUTPATIENT)
Dept: CT IMAGING | Age: 61
DRG: 202 | End: 2021-03-30
Payer: COMMERCIAL

## 2021-03-30 ENCOUNTER — APPOINTMENT (OUTPATIENT)
Dept: GENERAL RADIOLOGY | Age: 61
DRG: 202 | End: 2021-03-30
Payer: COMMERCIAL

## 2021-03-30 PROBLEM — J01.90 ACUTE SINUSITIS: Status: ACTIVE | Noted: 2021-03-30

## 2021-03-30 LAB
ABSOLUTE EOS #: 0 K/UL (ref 0–0.44)
ABSOLUTE IMMATURE GRANULOCYTE: 0.07 K/UL (ref 0–0.3)
ABSOLUTE LYMPH #: 0.58 K/UL (ref 1.1–3.7)
ABSOLUTE MONO #: 0.14 K/UL (ref 0.1–1.2)
ANION GAP SERPL CALCULATED.3IONS-SCNC: 11 MMOL/L (ref 9–17)
BASOPHILS # BLD: 0 % (ref 0–2)
BASOPHILS ABSOLUTE: 0 K/UL (ref 0–0.2)
BUN BLDV-MCNC: 14 MG/DL (ref 6–20)
BUN/CREAT BLD: ABNORMAL (ref 9–20)
CALCIUM SERPL-MCNC: 9.3 MG/DL (ref 8.6–10.4)
CHLORIDE BLD-SCNC: 99 MMOL/L (ref 98–107)
CO2: 22 MMOL/L (ref 20–31)
CREAT SERPL-MCNC: 0.43 MG/DL (ref 0.5–0.9)
D-DIMER QUANTITATIVE: <0.17 MG/L FEU
DIFFERENTIAL TYPE: ABNORMAL
EKG ATRIAL RATE: 87 BPM
EKG P AXIS: 74 DEGREES
EKG P-R INTERVAL: 140 MS
EKG Q-T INTERVAL: 372 MS
EKG QRS DURATION: 86 MS
EKG QTC CALCULATION (BAZETT): 447 MS
EKG R AXIS: 55 DEGREES
EKG T AXIS: 62 DEGREES
EKG VENTRICULAR RATE: 87 BPM
EOSINOPHILS RELATIVE PERCENT: 0 % (ref 1–4)
GFR AFRICAN AMERICAN: >60 ML/MIN
GFR NON-AFRICAN AMERICAN: >60 ML/MIN
GFR SERPL CREATININE-BSD FRML MDRD: ABNORMAL ML/MIN/{1.73_M2}
GFR SERPL CREATININE-BSD FRML MDRD: ABNORMAL ML/MIN/{1.73_M2}
GLUCOSE BLD-MCNC: 150 MG/DL (ref 70–99)
HCT VFR BLD CALC: 39.2 % (ref 36.3–47.1)
HEMOGLOBIN: 13.2 G/DL (ref 11.9–15.1)
IMMATURE GRANULOCYTES: 1 %
LYMPHOCYTES # BLD: 8 % (ref 24–43)
MCH RBC QN AUTO: 31.1 PG (ref 25.2–33.5)
MCHC RBC AUTO-ENTMCNC: 33.7 G/DL (ref 28.4–34.8)
MCV RBC AUTO: 92.2 FL (ref 82.6–102.9)
MONOCYTES # BLD: 2 % (ref 3–12)
MORPHOLOGY: NORMAL
NRBC AUTOMATED: 0 PER 100 WBC
PDW BLD-RTO: 12.8 % (ref 11.8–14.4)
PLATELET # BLD: 361 K/UL (ref 138–453)
PLATELET ESTIMATE: ABNORMAL
PMV BLD AUTO: 9.1 FL (ref 8.1–13.5)
POTASSIUM SERPL-SCNC: 4 MMOL/L (ref 3.7–5.3)
RBC # BLD: 4.25 M/UL (ref 3.95–5.11)
RBC # BLD: ABNORMAL 10*6/UL
SEG NEUTROPHILS: 89 % (ref 36–65)
SEGMENTED NEUTROPHILS ABSOLUTE COUNT: 6.41 K/UL (ref 1.5–8.1)
SODIUM BLD-SCNC: 132 MMOL/L (ref 135–144)
WBC # BLD: 7.2 K/UL (ref 3.5–11.3)
WBC # BLD: ABNORMAL 10*3/UL

## 2021-03-30 PROCEDURE — 80048 BASIC METABOLIC PNL TOTAL CA: CPT

## 2021-03-30 PROCEDURE — 94761 N-INVAS EAR/PLS OXIMETRY MLT: CPT

## 2021-03-30 PROCEDURE — 1200000000 HC SEMI PRIVATE

## 2021-03-30 PROCEDURE — 36415 COLL VENOUS BLD VENIPUNCTURE: CPT

## 2021-03-30 PROCEDURE — 71045 X-RAY EXAM CHEST 1 VIEW: CPT

## 2021-03-30 PROCEDURE — 70486 CT MAXILLOFACIAL W/O DYE: CPT

## 2021-03-30 PROCEDURE — 94640 AIRWAY INHALATION TREATMENT: CPT

## 2021-03-30 PROCEDURE — 6370000000 HC RX 637 (ALT 250 FOR IP): Performed by: STUDENT IN AN ORGANIZED HEALTH CARE EDUCATION/TRAINING PROGRAM

## 2021-03-30 PROCEDURE — 6370000000 HC RX 637 (ALT 250 FOR IP): Performed by: INTERNAL MEDICINE

## 2021-03-30 PROCEDURE — 2580000003 HC RX 258: Performed by: STUDENT IN AN ORGANIZED HEALTH CARE EDUCATION/TRAINING PROGRAM

## 2021-03-30 PROCEDURE — 93010 ELECTROCARDIOGRAM REPORT: CPT | Performed by: INTERNAL MEDICINE

## 2021-03-30 PROCEDURE — 6360000002 HC RX W HCPCS: Performed by: STUDENT IN AN ORGANIZED HEALTH CARE EDUCATION/TRAINING PROGRAM

## 2021-03-30 PROCEDURE — 85025 COMPLETE CBC W/AUTO DIFF WBC: CPT

## 2021-03-30 PROCEDURE — 2700000000 HC OXYGEN THERAPY PER DAY

## 2021-03-30 PROCEDURE — 85379 FIBRIN DEGRADATION QUANT: CPT

## 2021-03-30 PROCEDURE — 99233 SBSQ HOSP IP/OBS HIGH 50: CPT | Performed by: INTERNAL MEDICINE

## 2021-03-30 RX ORDER — GUAIFENESIN DEXTROMETHORPHAN HYDROBROMIDE ORAL SOLUTION 10; 100 MG/5ML; MG/5ML
10 SOLUTION ORAL EVERY 4 HOURS PRN
Status: DISCONTINUED | OUTPATIENT
Start: 2021-03-30 | End: 2021-03-31 | Stop reason: HOSPADM

## 2021-03-30 RX ORDER — OXYMETAZOLINE HYDROCHLORIDE 0.05 G/100ML
2 SPRAY NASAL 2 TIMES DAILY
Status: DISCONTINUED | OUTPATIENT
Start: 2021-03-30 | End: 2021-03-31 | Stop reason: HOSPADM

## 2021-03-30 RX ORDER — FLUTICASONE PROPIONATE 50 MCG
2 SPRAY, SUSPENSION (ML) NASAL DAILY
Status: DISCONTINUED | OUTPATIENT
Start: 2021-03-31 | End: 2021-03-31 | Stop reason: HOSPADM

## 2021-03-30 RX ORDER — ECHINACEA PURPUREA EXTRACT 125 MG
1 TABLET ORAL PRN
Status: DISCONTINUED | OUTPATIENT
Start: 2021-03-30 | End: 2021-03-31 | Stop reason: HOSPADM

## 2021-03-30 RX ADMIN — METHYLPREDNISOLONE SODIUM SUCCINATE 60 MG: 125 INJECTION, POWDER, FOR SOLUTION INTRAMUSCULAR; INTRAVENOUS at 04:13

## 2021-03-30 RX ADMIN — METHYLPREDNISOLONE SODIUM SUCCINATE 60 MG: 125 INJECTION, POWDER, FOR SOLUTION INTRAMUSCULAR; INTRAVENOUS at 14:56

## 2021-03-30 RX ADMIN — SODIUM CHLORIDE, PRESERVATIVE FREE 10 ML: 5 INJECTION INTRAVENOUS at 08:34

## 2021-03-30 RX ADMIN — Medication 2 SPRAY: at 20:59

## 2021-03-30 RX ADMIN — FAMOTIDINE 20 MG: 20 TABLET, FILM COATED ORAL at 20:57

## 2021-03-30 RX ADMIN — ENOXAPARIN SODIUM 40 MG: 40 INJECTION SUBCUTANEOUS at 08:33

## 2021-03-30 RX ADMIN — HYDROCHLOROTHIAZIDE 25 MG: 25 TABLET ORAL at 08:32

## 2021-03-30 RX ADMIN — GUAIFENESIN DEXTROMETHORPHAN HYDROBROMIDE ORAL SOLUTION 10 ML: 200; 20 SOLUTION ORAL at 05:58

## 2021-03-30 RX ADMIN — ACETAMINOPHEN 650 MG: 325 TABLET ORAL at 17:07

## 2021-03-30 RX ADMIN — BUDESONIDE AND FORMOTEROL FUMARATE DIHYDRATE 2 PUFF: 160; 4.5 AEROSOL RESPIRATORY (INHALATION) at 07:55

## 2021-03-30 RX ADMIN — BUDESONIDE AND FORMOTEROL FUMARATE DIHYDRATE 2 PUFF: 160; 4.5 AEROSOL RESPIRATORY (INHALATION) at 21:19

## 2021-03-30 RX ADMIN — IPRATROPIUM BROMIDE AND ALBUTEROL SULFATE 1 AMPULE: .5; 3 SOLUTION RESPIRATORY (INHALATION) at 15:42

## 2021-03-30 RX ADMIN — DOXYCYCLINE HYCLATE 100 MG: 100 TABLET, COATED ORAL at 20:57

## 2021-03-30 RX ADMIN — ENALAPRIL MALEATE 10 MG: 10 TABLET ORAL at 08:33

## 2021-03-30 RX ADMIN — METHYLPREDNISOLONE SODIUM SUCCINATE 60 MG: 125 INJECTION, POWDER, FOR SOLUTION INTRAMUSCULAR; INTRAVENOUS at 08:34

## 2021-03-30 RX ADMIN — IPRATROPIUM BROMIDE AND ALBUTEROL SULFATE 1 AMPULE: .5; 3 SOLUTION RESPIRATORY (INHALATION) at 11:54

## 2021-03-30 RX ADMIN — SODIUM CHLORIDE, PRESERVATIVE FREE 10 ML: 5 INJECTION INTRAVENOUS at 04:14

## 2021-03-30 RX ADMIN — IPRATROPIUM BROMIDE AND ALBUTEROL SULFATE 1 AMPULE: .5; 3 SOLUTION RESPIRATORY (INHALATION) at 07:55

## 2021-03-30 RX ADMIN — FAMOTIDINE 20 MG: 20 TABLET, FILM COATED ORAL at 08:32

## 2021-03-30 RX ADMIN — GUAIFENESIN DEXTROMETHORPHAN HYDROBROMIDE ORAL SOLUTION 10 ML: 200; 20 SOLUTION ORAL at 15:00

## 2021-03-30 RX ADMIN — CETIRIZINE HYDROCHLORIDE 10 MG: 10 TABLET ORAL at 08:32

## 2021-03-30 RX ADMIN — METHYLPREDNISOLONE SODIUM SUCCINATE 60 MG: 125 INJECTION, POWDER, FOR SOLUTION INTRAMUSCULAR; INTRAVENOUS at 20:57

## 2021-03-30 RX ADMIN — Medication 2 SPRAY: at 08:33

## 2021-03-30 RX ADMIN — MONTELUKAST SODIUM 10 MG: 10 TABLET, FILM COATED ORAL at 20:57

## 2021-03-30 RX ADMIN — GUAIFENESIN DEXTROMETHORPHAN HYDROBROMIDE ORAL SOLUTION 10 ML: 200; 20 SOLUTION ORAL at 10:42

## 2021-03-30 RX ADMIN — IPRATROPIUM BROMIDE AND ALBUTEROL SULFATE 1 AMPULE: .5; 3 SOLUTION RESPIRATORY (INHALATION) at 21:19

## 2021-03-30 RX ADMIN — SODIUM CHLORIDE, PRESERVATIVE FREE 10 ML: 5 INJECTION INTRAVENOUS at 20:57

## 2021-03-30 RX ADMIN — DOXYCYCLINE HYCLATE 100 MG: 100 TABLET, COATED ORAL at 08:32

## 2021-03-30 ASSESSMENT — PAIN SCALES - GENERAL
PAINLEVEL_OUTOF10: 5
PAINLEVEL_OUTOF10: 3

## 2021-03-30 NOTE — PROGRESS NOTES
[facility]  Internal Medicine Teaching Residency Program  Inpatient Daily Progress Note  ______________________________________________________________________________    Patient: Judy Pemberton  YOB: 1960   WDI:[id number]    Acct: [de-identified]     Room: [address] [place] [address]  Admit date: 3/29/2021  Today's date: 03/30/21  Number of days in the hospital: 1    SUBJECTIVE   Admitting Diagnosis: <principal problem not specified>  CC: sob   Pt examined at bedside. Was saturating 93 percent on 2- 3 L . Will order ct sinus and cxr . ROS:  Constitutional:  negative for chills, fevers, sweats  Respiratory:  negative for cough, dyspnea on exertion, hemoptysis, shortness of breath, wheezing  Cardiovascular:  negative for chest pain, chest pressure/discomfort, lower extremity edema, palpitations  Gastrointestinal:  negative for abdominal pain, constipation, diarrhea, nausea, vomiting  Neurological:  negative for dizziness, headache  BRIEF HISTORY     The patient is a pleasant 62 y.o. female presents with past medical history of asthma, hypertension. Patient with above past medical history presented with shortness of breath . According to patient symptoms started couple of days ago and had nasal congestion initially which then converted to chest congestion. Patient follows with Dr. Gabriella Guzmán for asthma and called the office on March 25 and got prednisone 60 mg 3 times daily in  tapering dosing . however her symptoms does not improved 3 days into taking the prednisone . Patient patient visited the urgent care which she received IM Solu-Medrol and breathing treatment, chest x-ray. Patient was referred to [facility] ED because she was hypoxic at around 90%.      In the emergency department of [facility] patient was found to have  pH 7.43, PCO2 35.5, bicarbonate 23.4  Serum electrolytes unremarkable, CBC unremarkable  Troponin <6 , Covid negative  Rhino virus Positive  CXR hyclate  100 mg Oral 2 times per day    enoxaparin  40 mg Subcutaneous Daily    sodium chloride flush  10 mL Intravenous 2 times per day    famotidine  20 mg Oral BID    enalapril  10 mg Oral Daily    And    hydroCHLOROthiazide  25 mg Oral Daily    ipratropium-albuterol  1 ampule Inhalation 4x daily    fluticasone  2 spray Each Nostril Daily     Continuous Infusions:    sodium chloride       PRN Medicationsdextromethorphan-guaiFENesin, 10 mL, Q4H PRN  albuterol, 2.5 mg, As Directed RT PRN  promethazine, 12.5 mg, Q6H PRN    Or  ondansetron, 4 mg, Q6H PRN  polyethylene glycol, 17 g, Daily PRN  acetaminophen, 650 mg, Q6H PRN    Or  acetaminophen, 650 mg, Q6H PRN  sodium chloride flush, 10 mL, PRN  sodium chloride, 25 mL, PRN  potassium chloride, 40 mEq, PRN    Or  potassium alternative oral replacement, 40 mEq, PRN    Or  potassium chloride, 10 mEq, PRN  sodium chloride, 1 drop, PRN        Diagnostic Labs:  CBC:   Recent Labs     03/29/21  1210 03/30/21  0545   WBC 8.7 7.2   RBC 4.08 4.25   HGB 12.7 13.2   HCT 38.0 39.2   MCV 93.1 92.2   RDW 12.8 12.8    361     BMP:   Recent Labs     03/29/21  1210 03/30/21  0545    132*   K 4.5 4.0   CL 99 99   CO2 21 22   BUN 13 14   CREATININE 0.86 0.43*     BNP: No results for input(s): BNP in the last 72 hours. PT/INR: No results for input(s): PROTIME, INR in the last 72 hours. APTT: No results for input(s): APTT in the last 72 hours. CARDIAC ENZYMES: No results for input(s): CKMB, CKMBINDEX, TROPONINI in the last 72 hours. Invalid input(s): CKTOTAL;3  FASTING LIPID PANEL:  Lab Results   Component Value Date    CHOL 212 09/15/2016    HDL 77 (A) 09/15/2016    TRIG 107 09/15/2016     LIVER PROFILE: No results for input(s): AST, ALT, ALB, BILIDIR, BILITOT, ALKPHOS in the last 72 hours.    MICROBIOLOGY:   Lab Results   Component Value Date/Time    CULTURE NO SIGNIFICANT GROWTH 02/08/2021 02:35 PM       Imaging:    Xr Chest Portable    Result Date: 3/29/2021 fluid level. She is allergic to multiple medications including penicillin, sulfa, and Clinda and quinolone. She is currently on doxycycline 100 mg twice daily she will need 10 to 14 days treatment. Her respiratory viral panel which was sent was positive for rhinovirus otherwise negative. Chest x-ray repeated this morning shows mild right basilar infiltrate/atelectasis. We will check D-dimer if elevated then will get CTA chest   Continue with Symbicort, continue Solu-Medrol today and continue with DuoNeb aerosol. Flonase nasal spray and nasal decongestant and saline spray continue with Zyrtec. Discussed with nursing staff, treatment and plan discussed. Please note that this chart was generated using voice recognition Dragon dictation software. Although every effort was made to ensure the accuracy of this automated transcription, some errors in transcription may have occurred.      Anton Chin MD  3/30/2021 2:52 PM

## 2021-03-30 NOTE — PLAN OF CARE
Problem: Discharge Planning:  Goal: Discharged to appropriate level of care  Description: Discharged to appropriate level of care  Outcome: Ongoing     Problem:  Activity Intolerance:  Goal: Ability to perform activities of daily living will improve  Description: Ability to perform activities of daily living will improve  3/30/2021 1752 by Reyes Lesches, RN  Outcome: Ongoing  3/30/2021 0514 by Jose eVrdugo RN  Outcome: Ongoing     Problem: Airway Clearance - Ineffective:  Goal: Ability to maintain a clear airway will improve  Description: Ability to maintain a clear airway will improve  3/30/2021 1752 by Reyes Lesches, RN  Outcome: Ongoing  3/30/2021 0514 by Jose Verdugo RN  Outcome: Met This Shift     Problem: Gas Exchange - Impaired:  Goal: Levels of oxygenation will improve  Description: Levels of oxygenation will improve  3/30/2021 1752 by Reyes Lesches, RN  Outcome: Ongoing  3/30/2021 0514 by Jose Verdugo RN  Outcome: Ongoing     Problem: Mood - Altered:  Goal: Emotional status will stabilize  Description: Emotional status will stabilize  3/30/2021 1752 by Reyes Lesches, RN  Outcome: Ongoing  3/30/2021 0514 by Jose Verdugo RN  Outcome: Ongoing     Problem: Tobacco Use:  Goal: Will participate in inpatient tobacco-use cessation counseling  Description: Will participate in inpatient tobacco-use cessation counseling  3/30/2021 1752 by Reyes Lesches, RN  Outcome: Ongoing  3/30/2021 0514 by Jose Verdugo RN  Outcome: Ongoing

## 2021-03-30 NOTE — PLAN OF CARE
Problem: Airway Clearance - Ineffective:  Goal: Ability to maintain a clear airway will improve  Description: Ability to maintain a clear airway will improve  Outcome: Ongoing     Problem: Gas Exchange - Impaired:  Goal: Levels of oxygenation will improve  Description: Levels of oxygenation will improve  Outcome: Ongoing  Note: BRONCHOSPASM/BRONCHOCONSTRICTION     [x]         IMPROVE AERATION/BREATH SOUNDS  [x]   ADMINISTER BRONCHODILATOR THERAPY AS APPROPRIATE  [x]   ASSESS BREATH SOUNDS  [x]   IMPLEMENT AEROSOL/MDI PROTOCOL  [x]   PATIENT EDUCATION AS NEEDED

## 2021-03-30 NOTE — CARE COORDINATION
Case Management Initial Discharge Plan  Buffy Arita,             Met with:patient to discuss discharge plans. Information verified: address, contacts, phone number, , insurance Yes    Emergency Contact/Next of Kin name & number: Chantel Don spouse [phone]    PCP: Jackie Davila MD  Date of last visit: 2021    Insurance Provider: Demetri Bhardwaj     Discharge Planning    Living Arrangements:  Spouse/Significant Other   Support Systems:  Children, Family Members, Spouse/Significant Other, Friends/Neighbors    Home has 2 stories  2 stairs to climb to get into front door, 10stairs to climb to reach second floor  Location of bedroom/bathroom in home main    Patient able to perform ADL's:Independent    Current Services (outpatient & in home) no  DME equipment: no  DME provider: no    Receiving oral anticoagulation therapy? No    If indicated:   Physician managing anticoagulation treatment: no  Where does patient obtain lab work for ATC treatment? Potential Assistance Needed:  Durable Medical Equipment    Patient agreeable to home care: No  [place] of choice provided:  no    Prior SNF/Rehab Placement and Facility:no  Agreeable to SNF/Rehab: No  [place] of choice provided: no     Evaluation: no    Expected Discharge date:  21    Patient expects to be discharged to:  home  Follow Up Appointment: Best Day/ Time: Monday AM    Transportation provider: no  Transportation arrangements needed for discharge: No    Readmission Risk              Risk of Unplanned Readmission:        13             Does patient have a readmission risk score greater than 14?: No  If yes, follow-up appointment must be made within 7 days of discharge.      Goals of Care:       Discharge Plan: home independently with spouse follow for needs          Electronically signed by Agnes Alejo RN on 3/30/21 at 4:43 PM EDT

## 2021-03-31 VITALS
RESPIRATION RATE: 18 BRPM | BODY MASS INDEX: 24.06 KG/M2 | TEMPERATURE: 98.1 F | OXYGEN SATURATION: 95 % | DIASTOLIC BLOOD PRESSURE: 78 MMHG | HEIGHT: 66 IN | SYSTOLIC BLOOD PRESSURE: 127 MMHG | WEIGHT: 149.7 LBS | HEART RATE: 89 BPM

## 2021-03-31 LAB
ABSOLUTE EOS #: 0 K/UL (ref 0–0.4)
ABSOLUTE IMMATURE GRANULOCYTE: 0 K/UL (ref 0–0.3)
ABSOLUTE LYMPH #: 1.22 K/UL (ref 1–4.8)
ABSOLUTE MONO #: 0.61 K/UL (ref 0.1–0.8)
ANION GAP SERPL CALCULATED.3IONS-SCNC: 11 MMOL/L (ref 9–17)
BASOPHILS # BLD: 0 % (ref 0–2)
BASOPHILS ABSOLUTE: 0 K/UL (ref 0–0.2)
BUN BLDV-MCNC: 15 MG/DL (ref 6–20)
BUN/CREAT BLD: ABNORMAL (ref 9–20)
CALCIUM SERPL-MCNC: 8.9 MG/DL (ref 8.6–10.4)
CHLORIDE BLD-SCNC: 99 MMOL/L (ref 98–107)
CO2: 24 MMOL/L (ref 20–31)
CREAT SERPL-MCNC: 0.36 MG/DL (ref 0.5–0.9)
DIFFERENTIAL TYPE: ABNORMAL
EOSINOPHILS RELATIVE PERCENT: 0 % (ref 1–4)
GFR AFRICAN AMERICAN: >60 ML/MIN
GFR NON-AFRICAN AMERICAN: >60 ML/MIN
GFR SERPL CREATININE-BSD FRML MDRD: ABNORMAL ML/MIN/{1.73_M2}
GFR SERPL CREATININE-BSD FRML MDRD: ABNORMAL ML/MIN/{1.73_M2}
GLUCOSE BLD-MCNC: 150 MG/DL (ref 70–99)
HCT VFR BLD CALC: 37.4 % (ref 36.3–47.1)
HEMOGLOBIN: 12.7 G/DL (ref 11.9–15.1)
IMMATURE GRANULOCYTES: 0 %
LYMPHOCYTES # BLD: 12 % (ref 24–44)
MAGNESIUM: 2.2 MG/DL (ref 1.6–2.6)
MCH RBC QN AUTO: 31.2 PG (ref 25.2–33.5)
MCHC RBC AUTO-ENTMCNC: 34 G/DL (ref 28.4–34.8)
MCV RBC AUTO: 91.9 FL (ref 82.6–102.9)
MONOCYTES # BLD: 6 % (ref 1–7)
MORPHOLOGY: NORMAL
NRBC AUTOMATED: 0 PER 100 WBC
PDW BLD-RTO: 13 % (ref 11.8–14.4)
PLATELET # BLD: 359 K/UL (ref 138–453)
PLATELET ESTIMATE: ABNORMAL
PMV BLD AUTO: 9.2 FL (ref 8.1–13.5)
POTASSIUM SERPL-SCNC: 3.5 MMOL/L (ref 3.7–5.3)
RBC # BLD: 4.07 M/UL (ref 3.95–5.11)
RBC # BLD: ABNORMAL 10*6/UL
SEG NEUTROPHILS: 82 % (ref 36–66)
SEGMENTED NEUTROPHILS ABSOLUTE COUNT: 8.37 K/UL (ref 1.8–7.7)
SODIUM BLD-SCNC: 134 MMOL/L (ref 135–144)
WBC # BLD: 10.2 K/UL (ref 3.5–11.3)
WBC # BLD: ABNORMAL 10*3/UL

## 2021-03-31 PROCEDURE — 94761 N-INVAS EAR/PLS OXIMETRY MLT: CPT

## 2021-03-31 PROCEDURE — 6360000002 HC RX W HCPCS: Performed by: STUDENT IN AN ORGANIZED HEALTH CARE EDUCATION/TRAINING PROGRAM

## 2021-03-31 PROCEDURE — 83735 ASSAY OF MAGNESIUM: CPT

## 2021-03-31 PROCEDURE — 2580000003 HC RX 258: Performed by: STUDENT IN AN ORGANIZED HEALTH CARE EDUCATION/TRAINING PROGRAM

## 2021-03-31 PROCEDURE — 6370000000 HC RX 637 (ALT 250 FOR IP): Performed by: INTERNAL MEDICINE

## 2021-03-31 PROCEDURE — 94640 AIRWAY INHALATION TREATMENT: CPT

## 2021-03-31 PROCEDURE — 6370000000 HC RX 637 (ALT 250 FOR IP): Performed by: STUDENT IN AN ORGANIZED HEALTH CARE EDUCATION/TRAINING PROGRAM

## 2021-03-31 PROCEDURE — 80048 BASIC METABOLIC PNL TOTAL CA: CPT

## 2021-03-31 PROCEDURE — 2700000000 HC OXYGEN THERAPY PER DAY

## 2021-03-31 PROCEDURE — 99239 HOSP IP/OBS DSCHRG MGMT >30: CPT | Performed by: INTERNAL MEDICINE

## 2021-03-31 PROCEDURE — 36415 COLL VENOUS BLD VENIPUNCTURE: CPT

## 2021-03-31 PROCEDURE — 85025 COMPLETE CBC W/AUTO DIFF WBC: CPT

## 2021-03-31 RX ORDER — ECHINACEA PURPUREA EXTRACT 125 MG
1 TABLET ORAL PRN
Qty: 1 BOTTLE | Refills: 3 | Status: SHIPPED | OUTPATIENT
Start: 2021-03-31

## 2021-03-31 RX ORDER — IPRATROPIUM BROMIDE AND ALBUTEROL SULFATE 2.5; .5 MG/3ML; MG/3ML
1 SOLUTION RESPIRATORY (INHALATION) 3 TIMES DAILY
Status: DISCONTINUED | OUTPATIENT
Start: 2021-03-31 | End: 2021-03-31 | Stop reason: HOSPADM

## 2021-03-31 RX ORDER — IPRATROPIUM BROMIDE AND ALBUTEROL SULFATE 2.5; .5 MG/3ML; MG/3ML
3 SOLUTION RESPIRATORY (INHALATION) 3 TIMES DAILY
Qty: 360 ML | Refills: 2 | Status: SHIPPED | OUTPATIENT
Start: 2021-03-31

## 2021-03-31 RX ORDER — PREDNISONE 10 MG/1
30 TABLET ORAL DAILY
Qty: 9 TABLET | Refills: 0 | Status: SHIPPED | OUTPATIENT
Start: 2021-04-04 | End: 2021-04-07

## 2021-03-31 RX ORDER — DOXYCYCLINE HYCLATE 100 MG
100 TABLET ORAL EVERY 12 HOURS SCHEDULED
Qty: 20 TABLET | Refills: 0 | Status: SHIPPED | OUTPATIENT
Start: 2021-03-31 | End: 2021-03-31

## 2021-03-31 RX ORDER — FAMOTIDINE 20 MG/1
20 TABLET, FILM COATED ORAL 2 TIMES DAILY
Qty: 60 TABLET | Refills: 1 | Status: SHIPPED | OUTPATIENT
Start: 2021-03-31

## 2021-03-31 RX ORDER — PREDNISONE 10 MG/1
10 TABLET ORAL DAILY
Qty: 3 TABLET | Refills: 0 | Status: SHIPPED | OUTPATIENT
Start: 2021-04-10 | End: 2021-04-13

## 2021-03-31 RX ORDER — DOXYCYCLINE HYCLATE 100 MG
100 TABLET ORAL EVERY 12 HOURS SCHEDULED
Qty: 24 TABLET | Refills: 0 | Status: SHIPPED | OUTPATIENT
Start: 2021-03-31 | End: 2021-04-12

## 2021-03-31 RX ORDER — PREDNISONE 1 MG/1
5 TABLET ORAL DAILY
Qty: 3 TABLET | Refills: 0 | Status: SHIPPED | OUTPATIENT
Start: 2021-04-13 | End: 2021-04-16

## 2021-03-31 RX ORDER — METHYLPREDNISOLONE SODIUM SUCCINATE 40 MG/ML
40 INJECTION, POWDER, LYOPHILIZED, FOR SOLUTION INTRAMUSCULAR; INTRAVENOUS EVERY 12 HOURS
Status: DISCONTINUED | OUTPATIENT
Start: 2021-03-31 | End: 2021-03-31 | Stop reason: HOSPADM

## 2021-03-31 RX ORDER — PREDNISONE 10 MG/1
20 TABLET ORAL DAILY
Qty: 6 TABLET | Refills: 0 | Status: SHIPPED | OUTPATIENT
Start: 2021-04-07 | End: 2021-04-10

## 2021-03-31 RX ORDER — OXYMETAZOLINE HYDROCHLORIDE 0.05 G/100ML
2 SPRAY NASAL 2 TIMES DAILY
Qty: 15 ML | Refills: 0 | Status: SHIPPED | OUTPATIENT
Start: 2021-03-31 | End: 2021-04-02

## 2021-03-31 RX ORDER — PREDNISONE 10 MG/1
40 TABLET ORAL DAILY
Qty: 12 TABLET | Refills: 0 | Status: SHIPPED | OUTPATIENT
Start: 2021-03-31 | End: 2021-04-03

## 2021-03-31 RX ADMIN — BUDESONIDE AND FORMOTEROL FUMARATE DIHYDRATE 2 PUFF: 160; 4.5 AEROSOL RESPIRATORY (INHALATION) at 09:05

## 2021-03-31 RX ADMIN — FAMOTIDINE 20 MG: 20 TABLET, FILM COATED ORAL at 08:55

## 2021-03-31 RX ADMIN — HYDROCHLOROTHIAZIDE 25 MG: 25 TABLET ORAL at 08:54

## 2021-03-31 RX ADMIN — GUAIFENESIN DEXTROMETHORPHAN HYDROBROMIDE ORAL SOLUTION 10 ML: 200; 20 SOLUTION ORAL at 01:02

## 2021-03-31 RX ADMIN — FLUTICASONE PROPIONATE 2 SPRAY: 50 SPRAY, METERED NASAL at 08:53

## 2021-03-31 RX ADMIN — IPRATROPIUM BROMIDE AND ALBUTEROL SULFATE 1 AMPULE: .5; 3 SOLUTION RESPIRATORY (INHALATION) at 09:05

## 2021-03-31 RX ADMIN — GUAIFENESIN DEXTROMETHORPHAN HYDROBROMIDE ORAL SOLUTION 10 ML: 200; 20 SOLUTION ORAL at 09:04

## 2021-03-31 RX ADMIN — Medication 2 SPRAY: at 08:54

## 2021-03-31 RX ADMIN — SODIUM CHLORIDE, PRESERVATIVE FREE 10 ML: 5 INJECTION INTRAVENOUS at 08:55

## 2021-03-31 RX ADMIN — METHYLPREDNISOLONE SODIUM SUCCINATE 60 MG: 125 INJECTION, POWDER, FOR SOLUTION INTRAMUSCULAR; INTRAVENOUS at 03:46

## 2021-03-31 RX ADMIN — CETIRIZINE HYDROCHLORIDE 10 MG: 10 TABLET ORAL at 08:54

## 2021-03-31 RX ADMIN — IPRATROPIUM BROMIDE AND ALBUTEROL SULFATE 1 AMPULE: .5; 3 SOLUTION RESPIRATORY (INHALATION) at 14:25

## 2021-03-31 RX ADMIN — POTASSIUM BICARBONATE 40 MEQ: 782 TABLET, EFFERVESCENT ORAL at 08:53

## 2021-03-31 RX ADMIN — ENOXAPARIN SODIUM 40 MG: 40 INJECTION SUBCUTANEOUS at 08:54

## 2021-03-31 RX ADMIN — ENALAPRIL MALEATE 10 MG: 10 TABLET ORAL at 08:54

## 2021-03-31 RX ADMIN — DOXYCYCLINE HYCLATE 100 MG: 100 TABLET, COATED ORAL at 08:54

## 2021-03-31 ASSESSMENT — PAIN SCALES - GENERAL: PAINLEVEL_OUTOF10: 0

## 2021-03-31 NOTE — PROGRESS NOTES
CLINICAL PHARMACY NOTE: [facility] [address] [facility] [address] [facility] Patient?: Yes  Total # of Prescriptions Filled: 6   The following medications were delivered to the patient:  · duoned  · Prednisone taper  · Afrin  · Ocean  · pepcid 20mg tablet  · [id number] tablet  Total # of Interventions Completed: 0  Time Spent (min): 0    Additional Documentation: meds delivered to the pt room in room 439, on 03.31.21 at14:25  Pt had a co pay of 22.77, paid with a card

## 2021-03-31 NOTE — PROGRESS NOTES
[facility] Assessment complete. Acute asthma exacerbation [J45.901] . Vitals:    03/31/21 0905   BP:    Pulse:    Resp: 18   Temp:    SpO2: 99%   . Patients home meds are   Prior to Admission medications    Medication Sig Start Date End Date Taking? Authorizing Provider   guaiFENesin (ROBITUSSIN) 100 MG/5ML SOLN oral solution Take 200 mg by mouth every 4 hours as needed for Cough   Yes Historical Provider, MD   predniSONE (DELTASONE) 20 MG tablet 3 tabs x 3 days, then 2 tabs x 3 days, then 1 tab x 3 days 3/25/21 4/4/21  Nusrat Rico MD   fluticasone-salmeterol (ADVAIR) 500-50 MCG/DOSE diskus inhaler Inhale 1 puff into the lungs every 12 hours 2/18/21   Maria Pereira MD   albuterol sulfate HFA (VENTOLIN HFA) 108 (90 Base) MCG/ACT inhaler inhale 2 puffs by mouth every 6 hours if needed for wheezing 1/4/21   Nusrat Rico MD   enalapril-hydroCHLOROthiazide (VASERETIC) 10-25 MG per tablet take 1 tablet by mouth once daily 12/3/20   Maria Pereira MD   montelukast (SINGULAIR) 10 MG tablet Take 1 tablet by mouth nightly take 1 tablet by mouth every evening 11/2/20   Maria Pereira MD   fluticasone (FLONASE) 50 MCG/ACT nasal spray 1 spray by Each Nostril route daily    Historical Provider, MD   Multiple Vitamins-Minerals (THERAPEUTIC MULTIVITAMIN-MINERALS) tablet Take 1 tablet by mouth daily    Historical Provider, MD   albuterol (PROVENTIL) (2.5 MG/3ML) 0.083% nebulizer solution Take 3 mLs by nebulization every 6 hours as needed for Wheezing 3/24/20   EDUARDO Pena - CNP   Nebulizers (COMPRESSOR/NEBULIZER) MISC 1 each by Does not apply route 4 times daily for 14 days Use with medications as directed 2/26/19 3/12/19  Maria Pereira MD   acetaminophen (APAP EXTRA STRENGTH) 500 MG tablet Take 500 mg by mouth every 6 hours as needed for Pain. Historical Provider, MD   Loratadine (CLARITIN) 10 MG CAPS Take  by mouth.     Historical Provider, MD   .  Recent Surgical History: None = 0 Assessment   Pt currently on 2L NC    RR 18  Breath Sounds: clear/diminished      · Bronchodilator assessment at level  2  · Hyperinflation assessment at level   · Secretion Management assessment at level    ·   · [x]    Bronchodilator Assessment  BRONCHODILATOR ASSESSMENT SCORE  Score 0 1 2 3 4 5   Breath Sounds   []  Patient Baseline []  No Wheeze good aeration [x]  Faint, scattered wheezing, good aeration []  Expiratory Wheezing and or moderately diminished []  Insp/Exp wheeze and/or very diminished []  Insp/Exp and/ or marked distress   Respiratory Rate   []  Patient Baseline [x]  Less than 20 [x]  Less than 20 []  20-25 []  Greater than 25 []  Greater than 25   Peak flow % of Pred or PB []  NA   []  Greater than 90%  []  81-90% []  71-80% []  Less than or equal to 70%  or unable to perform []  Unable due to Respiratory Distress   Dyspnea re []  Patient Baseline [x]  No SOB [x]  No SOB []  SOB on exertion []  SOB min activity []  At rest/acute   e FEV% Predicted       []  NA []  Above 69%  []  Unable []  Above 60-69%  []  Unable []  Above 50-59%  []  Unable []  Above 35-49%  []  Unable []  Less than 35%  []  Unable                 []  Hyperinflation Assessment  Score 1 2 3   CXR and Breath Sounds   []  Clear []  No atelectasis  Basilar aeration []  Atelectasis or absent basilar breath sounds   Incentive Spirometry Volume  (Per IBW)   []  Greater than or equal to 15ml/Kg []  less than 15ml/Kg []  less than 15ml/Kg   Surgery within last 2 weeks []  None or general   []  Abdominal or thoracic surgery  []  Abdominal or thoracic   Chronic Pulmonary Historyre []  No []  Yes []  Yes     []  Secretion Management Assessment  Score 1 2 3   Bilateral Breath Sounds   []  Occasional Rhonchi []  Scattered Rhonchi []  Course Rhonchi and/or poor aeration   Sputum    []  Small amount of thin secretions []  Moderate amount of viscous secretions []  Copius, Viscious Yellow/ Secretions   CXR as reported by physician []  clear  [] Unavailable []  Infiltrates and/or consolidation  []  Unavailable []  Mucus Plugging and or lobar consolidation  []  Unavailable   Cough []  Strong, productive cough []  Weak productive cough []  No cough or weak non-productive cough   Sima Jonas  9:11 AM                            FEMALE                                  MALE                            FEV1 Predicted Normal Values                        FEV1 Predicted Normal Values          Age                                     Height in Feet and Inches       Age                                     Height in Feet and Inches       4' 11\" 5' 1\" 5' 3\" 5' 5\" 5' 7\" 5' 9\" 5' 11\" 6' 1\"  4' 11\" 5' 1\" 5' 3\" 5' 5\" 5' 7\" 5' 9\" 5' 11\" 6' 1\"   42 - 45 2.49 2.66 2.84 3.03 3.22 3.42 3.62 3.83 42 - 45 2.82 3.03 3.26 3.49 3.72 3.96 4.22 4.47   46 - 49 2.40 2.57 2.76 2.94 3.14 3.33 3.54 3.75 46 - 49 2.70 2.92 3.14 3.37 3.61 3.85 4.10 4.36   50 - 53 2.31 2.48 2.66 2.85 3.04 3.24 3.45 3.66 50 - 53 2.58 2.80 3.02 3.25 3.49 3.73 3.98 4.24   54 - 57 2.21 2.38 2.57 2.75 2.95 3.14 3.35 3.56 54 - 57 2.46 2.67 2.89 3.12 3.36 3.60 3.85 4.11   58 - 61 2.10 2.28 2.46 2.65 2.84 3.04 3.24 3.45 58 - 61 2.32 2.54 2.76 2.99 3.23 3.47 3.72 3.98   62 - 65 1.99 2.17 2.35 2.54 2.73 2.93 3.13 3.34 62 - 65 2.19 2.40 2.62 2.85 3.09 3.33 3.58 3.84   66 - 69 1.88 2.05 2.23 2.42 2.61 2.81 3.02 3.23 66 - 69 2.04 2.26 2.48 2.71 2.95 3.19 3.44 3.70   70+ 1.82 1.99 2.17 2.36 2.55 2.75 2.95 3.16 70+ 1.97 2.19 2.41 2.64 2.87 3.12 3.37 3.62             Predicted Peak Expiratory Flow Rate                                       Height (in)  Female       Height (in) Male           Age 31 51 66 56 63 77 78 74 Age            20 318 992 111 778 778 377 138 519  11 11 57 48 45 54 10 58 08   25 337 352 366 381 396 411 426 441 25 447 476 505 533 562 591 619 648 677   30 329 344 359 374 389 404 419 434 30 437 466 494 523 552 580 609 638 667   35 322 337 351 366 381 396 411 426 35 426 455 484 512 541 570 598 272 687   40 617 4281   65 277 292 306 321 336 351 366 381 65 363 392 421 449 478 507 535 564 594   70 269 284 299 314 329 344 359 374 70 353 382 410 439 468 496 525 554 583   75 261 274 289 305 319 334 348 364 75 344 372 400 429 458 487 515 544 573   80 253 266 282 296 312 327 342 356 80 335 362 390 419 448 476 [phone]

## 2021-03-31 NOTE — PROGRESS NOTES
[facility]  Internal Medicine Teaching Residency Program  Inpatient Daily Progress Note  ______________________________________________________________________________    Patient: Judy Pemberton  YOB: 1960   WVP:[id number]    Acct: [de-identified]     Room: [facility]  Admit date: 3/29/2021  Today's date: 03/31/21  Number of days in the hospital: 2    SUBJECTIVE   Admitting Diagnosis: <principal problem not specified>  CC: sob   Pt examined at bedside. no acute event overnight  Hemodynamically stable , afebrile , on NC 2-3 L saturating at 95 to 98 . CT scan was showing acute maxillary sinusitis . , wheezes decreased from yesterday . Patient was saturating 92 % on room air . Home oxygen evaluation will be ordered and steroid dose tapered  . Patient counseled and might be discharge later . Continue doxycycline 100 mg bid for 10 more days and follow with Dr Vi Murray as outpatient     ROS:  Constitutional:  negative for chills, fevers, sweats  Respiratory:  negative for cough, dyspnea on exertion, hemoptysis, shortness of breath, wheezing  Cardiovascular:  negative for chest pain, chest pressure/discomfort, lower extremity edema, palpitations  Gastrointestinal:  negative for abdominal pain, constipation, diarrhea, nausea, vomiting  Neurological:  negative for dizziness, headache  BRIEF HISTORY     The patient is a pleasant 62 y.o. female presents with past medical history of asthma, hypertension. Patient with above past medical history presented with shortness of breath . According to patient symptoms started couple of days ago and had nasal congestion initially which then converted to chest congestion. Patient follows with Dr. Gabriella Guzmán for asthma and called the office on March 25 and got prednisone 60 mg 3 times daily in  tapering dosing . however her symptoms does not improved 3 days into taking the prednisone .  Patient patient visited the urgent care which she received IM Solu-Medrol and breathing treatment, chest x-ray. Patient was referred to [facility] ED because she was hypoxic at around 90%.    In the emergency department of [facility] patient was found to have  pH 7.43, PCO2 35.5, bicarbonate 23.4  Serum electrolytes unremarkable, CBC unremarkable  Troponin <6 , Covid negative  Rhino virus Positive  CXR was not showing any infiltrate or  acute process     OBJECTIVE     Vital Signs:  /78   Pulse 89   Temp 98.1 °F (36.7 °C) (Oral)   Resp 18   Ht 5' 6\" (1.676 m)   Wt 149 lb 11.2 oz (67.9 kg)   SpO2 99%   BMI 24.16 kg/m²     Temp (24hrs), Av.1 °F (36.7 °C), Min:98.1 °F (36.7 °C), Max:98.1 °F (36.7 °C)    No intake/output data recorded. Physical Exam:  Constitutional: This is a well developed, well nourished, 18.5-24.9 - Normal 62y.o. year old female who is alert, oriented, cooperative and in no apparent distress. Head:normocephalic and atraumatic. EENT:  PERRLA. No conjunctival injections. Septum was midline, mucosa was without erythema, exudates or cobblestoning. No thrush was noted. Neck: Supple without thyromegaly. No elevated JVP. Trachea was midline. Respiratory: chest wheezing improved   Cardiovascular: Regular without murmur, clicks, gallops or rubs. Abdomen: Slightly rounded and soft without organomegaly. No rebound, rigidity or guarding was appreciated. Lymphatic: No lymphadenopathy. Musculoskeletal: Normal curvature of the spine. No gross muscle weakness. Extremities:  No lower extremity edema, ulcerations, tenderness, varicosities or erythema. Muscle size, tone and strength are normal.  No involuntary movements are noted. Skin:  Warm and dry. Good color, turgor and pigmentation. No lesions or scars.   No cyanosis or clubbing  Neurological/Psychiatric: The patient's general behavior, level of consciousness, thought content and emotional status is normal.        Medications:  Scheduled Medications:    methylPREDNISolone  40 mg Intravenous Q12H    ipratropium-albuterol  1 ampule Inhalation TID    fluticasone  2 spray Each Nostril Daily    oxymetazoline  2 spray Each Nostril BID    cetirizine  10 mg Oral Daily    montelukast  10 mg Oral Nightly    budesonide-formoterol  2 puff Inhalation BID    doxycycline hyclate  100 mg Oral 2 times per day    enoxaparin  40 mg Subcutaneous Daily    sodium chloride flush  10 mL Intravenous 2 times per day    famotidine  20 mg Oral BID    enalapril  10 mg Oral Daily    And    hydroCHLOROthiazide  25 mg Oral Daily     Continuous Infusions:    sodium chloride       PRN Medicationsdextromethorphan-guaiFENesin, 10 mL, Q4H PRN  sodium chloride, 1 spray, PRN  albuterol, 2.5 mg, As Directed RT PRN  promethazine, 12.5 mg, Q6H PRN    Or  ondansetron, 4 mg, Q6H PRN  polyethylene glycol, 17 g, Daily PRN  acetaminophen, 650 mg, Q6H PRN    Or  acetaminophen, 650 mg, Q6H PRN  sodium chloride flush, 10 mL, PRN  sodium chloride, 25 mL, PRN        Diagnostic Labs:  CBC:   Recent Labs     03/29/21  1210 03/30/21  0545 03/31/21  0628   WBC 8.7 7.2 10.2   RBC 4.08 4.25 4.07   HGB 12.7 13.2 12.7   HCT 38.0 39.2 37.4   MCV 93.1 92.2 91.9   RDW 12.8 12.8 13.0    361 359     BMP:   Recent Labs     03/29/21  1210 03/30/21  0545 03/31/21  0628    132* 134*   K 4.5 4.0 3.5*   CL 99 99 99   CO2 21 22 24   BUN 13 14 15   CREATININE 0.86 0.43* 0.36*     BNP: No results for input(s): BNP in the last 72 hours. PT/INR: No results for input(s): PROTIME, INR in the last 72 hours. APTT: No results for input(s): APTT in the last 72 hours. CARDIAC ENZYMES: No results for input(s): CKMB, CKMBINDEX, TROPONINI in the last 72 hours.     Invalid input(s): CKTOTAL;3  FASTING LIPID PANEL:  Lab Results   Component Value Date    CHOL 212 09/15/2016    HDL 77 (A) 09/15/2016    TRIG 107 09/15/2016     LIVER PROFILE: No results for input(s): AST, ALT, ALB, BILIDIR, BILITOT, ALKPHOS in the last 72 hours.   MICROBIOLOGY:   Lab Results   Component Value Date/Time    CULTURE NO SIGNIFICANT GROWTH 02/08/2021 02:35 PM       Imaging:    Xr Chest Portable    Result Date: 3/29/2021  No acute airspace disease identified. ASSESSMENT & PLAN     1) Acute hypoxemic respiratory failure secondary to asthma exacerbation due to rhino virus :  · IV Solu-Medrol 60 mg every 6 hour(started 3/29). Changed to 40 mg every 12 hour . On discharge change it to oral prednisone on tapering dose   · Albuterol nebulization   · Symbicort 2 puffs 2 times daily  · Montelukast 10 mg nightly. · douneb nebulization every 6 hours   · Doxycycline 100 mg BID (patient allergic to augmentin and levo), continue for 10 more days before patient follow with dr Janett Clark as an outpatient . · Rhino virus Positive   · CT showing acute sinusitis .    2) Essential Hypertension  · enalipril 10 mg   · Hydrochlorothiazide      3) GERD :  · Pepcid 20 mg bid      4)glucose 142 likely due to steroid   Will order hba1c        CT scan was showing acute maxillary sinusitis . wheezes decreased from yesterday on Physical exm . Patient was saturating 92 % on room air . Home oxygen evaluation will be ordered and steroid dose tapered  . Patient counseled and might be discharge later  afte home oxygen evaluation . Continue doxycycline 100 mg bid for 10 more days and follow with Dr Janett Clark as outpatient     DVT ppx: lovenox   GI ppx:  pepcid      PT/OT/SW on board   Discharge Planning: on board     Unique Munoz MD  Internal Medicine Resident, PGY-1  [facility]; [place], [place]  3/31/2021, 12:02 PM      Attending Physician Statement  I have discussed the care of Abdulkadir Rogers, including pertinent history and exam findings with the resident. I have reviewed the key elements of all parts of the encounter with the resident. I have seen and examined the patient with the resident.   I agree with the assessment and plan and status of the problem list as documented. I seen the chart, events noted labs reviewed overnight events seen. She is doing better she is ambulating to bathroom she is currently on oxygen. Her oxygen saturation is improving. She is on 2 L nasal cannula when she was taking off oxygen she is saturating 92% at rest.  She does have cough cough is improving postnasal drip is present nasal congestion is slightly better. On exam she looks comfortable not in distress not tachypneic and she has bilateral breath sounds slightly prolonged expiration no expiratory wheezing heard today. Her WBC count is 18.7 which is secondary to steroids she is currently on Solu-Medrol 60 every 8 hours. Patient wanted to go home today and she is clinically better but she will need home O2 evaluation and have discussed with her she will likely need short-term oxygen therapy and she agreed to use oxygen if needed. Home O2 evaluation today. On discharge we will give her prednisone taper dose. Continue with doxycycline she will need 2 weeks of treatment of doxycycline for acute sinusitis (multiple antibiotic allergies)  She will likely need ENT evaluation outpatient. Continue with current bronchodilators. Discussed with nursing staff, treatment and plan discussed. Please note that this chart was generated using voice recognition Dragon dictation software. Although every effort was made to ensure the accuracy of this automated transcription, some errors in transcription may have occurred.      Germaine Yepez MD  3/31/2021 12:31 PM

## 2021-03-31 NOTE — PROGRESS NOTES
Home Oxygen Evaluation    Home Oxygen Evaluation completed. Patient is on 2 liters per minute via NC. Resting SpO2 = 97%  Resting SpO2 on room air = 95%    SpO2 on room air with exercise = 94%  SpO2 on oxygen as above with exercise = 96%    Nocturnal Oximetry with patient on room air is recommended is SpO2 is between 89% and 95% (requires additional order).     Anais Tabor  12:57 PM

## 2021-03-31 NOTE — DISCHARGE INSTR - COC
Continuity of Care Form    Patient Name: Rebecca Mckoy   :  1960  MRN:  [id number]    Admit date:  3/29/2021  Discharge date:  ***    Code Status Order: Full Code   Advance Directives:   Advance Care Flowsheet Documentation       Date/Time Healthcare Directive Type of Healthcare Directive Copy in [address] Agent's Name Healthcare Agent's Phone Number    21 1500  Yes, patient has an advance directive for healthcare treatment  Durable power of  for health care  No, copy requested from family    Marika Lott  [phone]            Admitting Physician:  Jaylene North MD  PCP: William Saavedra MD    Discharging Nurse: [facility] Unit/Room#: 0705/9064-89  Discharging Unit Phone Number: ***    Emergency Contact:   Extended Emergency Contact Information  Primary Emergency Contact: Jose Luis Spangler  Address: [address] [place] [address] of [address] Phone: [phone]  Mobile Phone: [phone]  Relation: Spouse    Past Surgical History:  History reviewed. No pertinent surgical history.     Immunization History:   Immunization History   Administered Date(s) Administered    Influenza Vaccine, unspecified formulation 10/28/2017, 10/06/2018    Influenza Virus Vaccine 2012, 2014, 2014, 2014, 08/15/2015, 10/30/2016    Influenza, MDCK Quadv, IM, PF (Flucelvax 4 yrs and older) 2019    Influenza, Quadv, IM, (6 mo and older Fluzone, Flulaval, Fluarix and 3 yrs and older Afluria) 10/30/2016    Pneumococcal Polysaccharide (Dawszgdrt52) 2014    Tdap (Boostrix, Adacel) 2015       Active Problems:  Patient Active Problem List   Diagnosis Code    Right sided sciatica M54.31    Asthma J45.909    Hypertension I10    Abnormal drug screen R89.2    Acute diverticulitis K57.92    Pneumonia of right lower lobe due to infectious organism J18.9    Hyponatremia E87.1    Acute asthma exacerbation J45.901    Acute sinusitis J01.90       Isolation/Infection:   Isolation            No Isolation          Patient Infection Status       Infection Onset Added Last Indicated Last Indicated By Review Planned Expiration Resolved Resolved By    None active    Resolved    COVID-19 Rule Out 03/29/21 03/29/21 03/29/21 Respiratory Panel, Molecular, with COVID-19 (Restricted: peds pts or suitable admitted adults) (Ordered)   03/29/21 Rule-Out Test Resulted    COVID-19 Rule Out 03/29/21 03/29/21 03/29/21 COVID-19, Rapid (Ordered)   03/29/21 Rule-Out Test Resulted            Nurse Assessment:  Last Vital Signs: /78   Pulse 89   Temp 98.1 °F (36.7 °C) (Oral)   Resp 18   Ht 5' 6\" (1.676 m)   Wt 149 lb 11.2 oz (67.9 kg)   SpO2 99%   BMI 24.16 kg/m²     Last documented pain score (0-10 scale): Pain Level: 0  Last Weight:   Wt Readings from Last 1 Encounters:   03/29/21 149 lb 11.2 oz (67.9 kg)     Mental Status:  {IP PT MENTAL STATUS:20030:::0}    IV Access:  { NORMA IV ACCESS:563063949:::0}    Nursing Mobility/ADLs:  Walking   {CHP DME ADLs:607141233:::0}  Transfer  {CHP DME ADLs:327822321:::0}  Bathing  {CHP DME ADLs:233561959:::0}  Dressing  {CHP DME ADLs:297362381:::0}  Toileting  {CHP DME ADLs:618469860:::0}  Feeding  {CHP DME ADLs:658309924:::0}  Med Admin  {P DME ADLs:995498453:::0}  Med Delivery   { NORMA MED Delivery:182047711:::0}    Wound Care Documentation and Therapy:        Elimination:  Continence: Bowel: {YES / FC:74238}  Bladder: {YES / OL:62856}  Urinary Catheter: {Urinary Catheter:283704046:::0}   Colostomy/Ileostomy/Ileal Conduit: {YES / XZ:04946}       Date of Last BM: ***  No intake or output data in the 24 hours ending 03/31/21 1230  No intake/output data recorded.     Safety Concerns:     [facility] Safety Concerns:483936302:::0}    Impairments/Disabilities:      [facility] Impairments/Disabilities:877767383:::0}    Nutrition Therapy:  Current Nutrition Therapy:   [facility] Diet List:123990091:::0} Routes of Feeding: {CHP DME Other Feedings:013872910:::0}  Liquids: {Slp liquid thickness:53373}  Daily Fluid Restriction: {CHP DME Yes amt example:894566734:::0}  Last Modified Barium Swallow with Video (Video Swallowing Test): {Done Not Done UTOO:772638216:::5}    Treatments at the Time of Hospital Discharge:   Respiratory Treatments: ***  Oxygen Therapy:  {Therapy; copd oxygen:22460:::0}  Ventilator:    {[facility] Vent List:018903118:::0}    Rehab Therapies: {THERAPEUTIC INTERVENTION:5136597908}  Weight Bearing Status/Restrictions: {[facility] Weight Bearin:::0}  Other Medical Equipment (for information only, NOT a DME order):  {EQUIPMENT:471316734}  Other Treatments: ***    Patient's personal belongings (please select all that are sent with patient):  {CHP DME Belongings:599058175:::0}    RN SIGNATURE:  {Esignature:048757402:::0}    CASE MANAGEMENT/SOCIAL WORK SECTION    Inpatient Status Date: ***    Readmission Risk Assessment Score:  Readmission Risk              Risk of Unplanned Readmission:        12           Discharging to Facility/ Agency   Name:   Address:  Phone:  Fax:    Dialysis Facility (if applicable)   Name:  Address:  Dialysis Schedule:  Phone:  Fax:    / signature: {Esignature:466032257:::0}    PHYSICIAN SECTION    Prognosis: {Prognosis:7908070286:::0}    Condition at Discharge: [address] [place] [address] Patient Condition:356789671:::0}    Rehab Potential (if transferring to Rehab): {Prognosis:7099344464:::0}    Recommended Labs or Other Treatments After Discharge: ***    Physician Certification: I certify the above information and transfer of Rebecca Mckoy  is necessary for the continuing treatment of the diagnosis listed and that she requires {Admit to Appropriate Level of Care:89102:::0} for {GREATER/LESS:958578886} 30 days.      Update Admission H&P: {CHP DME Changes in HandP:155665737:::0}    PHYSICIAN SIGNATURE:  {Esignature:060466230:::0}

## 2021-04-06 NOTE — DISCHARGE SUMMARY
[facility]     Department of Internal Medicine - Staff Internal Medicine Teaching Service    INPATIENT DISCHARGE SUMMARY      Patient Identification:  Martina Roth is a 62 y.o. female. :  1960  MRN: [id number]     Acct: [de-identified]   PCP: Shari Little MD  Admit Date:  3/29/2021  Discharge date and time: 3/31/2021  3:39 PM   Attending Provider: No att. providers found                                     [facility] Problem Lists:  Active Problems:    Acute asthma exacerbation    Acute sinusitis    Acute respiratory failure with hypoxia ([address]  Resolved Problems:    * No resolved hospital problems. [facility] STAY     Brief Inpatient course:   Martina Roth is a 62 y.o. female with past med history of asthma, hypertension. Patient with above past medical history presented with shortness of breath .  According to patient symptoms started couple of days ago and had nasal congestion initially which then converted to chest congestion.  Patient follows with Dr. Daniel Escalera for asthma and called the office on  and got prednisone 60 mg 3 times daily in  tapering dosing . however her symptoms does not improved 3 days into taking the prednisone . Patient patient visited the urgent care which she received IM Solu-Medrol and breathing treatment, chest x-ray.  Patient was referred to [facility] ED because she was hypoxic at around 90%.    In the emergency department of [facility] patient was found to have  pH 7.43, PCO2 35.5, bicarbonate 23.4  Serum electrolytes unremarkable, CBC unremarkable  Troponin <6 , Covid negative  Rhino virus Positive  CXR was not showing any infiltrate or  acute process   CT scan was showing acute maxillary sinusitis     Patient was started on iv solumedral then changed to oral prednisone on discharge for copd . Patient was also given doxy for 10 more days  for sinusitis  because patient was allergic  To augmentin (DELTASONE) 5 MG tablet Take 1 tablet by mouth daily for 3 days, Disp-3 tablet, R-0Normal      doxycycline hyclate (VIBRA-TABS) 100 MG tablet Take 1 tablet by mouth every 12 hours for 12 days, Disp-24 tablet, R-0Normal       !! - Potential duplicate medications found. Please discuss with provider. CONTINUE these medications which have NOT CHANGED    Details   guaiFENesin (ROBITUSSIN) 100 MG/5ML SOLN oral solution Take 200 mg by mouth every 4 hours as needed for CoughHistorical Med      fluticasone-salmeterol (ADVAIR) 500-50 MCG/DOSE diskus inhaler Inhale 1 puff into the lungs every 12 hours, Disp-1 Inhaler, R-11Normal      albuterol sulfate HFA (VENTOLIN HFA) 108 (90 Base) MCG/ACT inhaler inhale 2 puffs by mouth every 6 hours if needed for wheezing, Disp-1 Inhaler, R-10PLEASE ASK PATIENT TO CALL OUR OFFICE FOR AN APPOINTMENT. Normal      enalapril-hydroCHLOROthiazide (VASERETIC) 10-25 MG per tablet take 1 tablet by mouth once daily, Disp-90 tablet,R-3Normal      montelukast (SINGULAIR) 10 MG tablet Take 1 tablet by mouth nightly take 1 tablet by mouth every evening, Disp-30 tablet,R-11Normal      fluticasone (FLONASE) 50 MCG/ACT nasal spray 1 spray by Each Nostril route dailyHistorical Med      Multiple Vitamins-Minerals (THERAPEUTIC MULTIVITAMIN-MINERALS) tablet Take 1 tablet by mouth dailyHistorical Med      Nebulizers (COMPRESSOR/NEBULIZER) MISC 4 TIMES DAILY Starting Tue 2/26/2019, Until Tue 3/12/2019, 56 doses, Disp-1 each, R-0, PrintUse with medications as directed      acetaminophen (APAP EXTRA STRENGTH) 500 MG tablet Take 500 mg by mouth every 6 hours as needed for Pain. Loratadine (CLARITIN) 10 MG CAPS Take  by mouth.          STOP taking these medications       ondansetron (ZOFRAN-ODT) 4 MG disintegrating tablet Comments:   Reason for Stopping:         omeprazole (PRILOSEC) 20 MG delayed release capsule Comments:   Reason for Stopping:         albuterol (PROVENTIL) (2.5 MG/3ML) 0.083% nebulizer solution Comments:   Reason for Stopping:               Activity: up as tolerated    Diet: general diet    Follow-up:    Beckie Heard MD  [place] [address]  [address] [facility]  [phone]    In 2 weeks  Follow up appointment in April 16th    Benita Gil Nathalia Clemons[address]  [phone]    In 1 week  Hospital follow up      Patient Instructions:  Complete 12 more days of antibiotics (total 14 days) - Doxycycline  Please use duoneb nebulization as needed  Use 2L oxygen while walking or during physical activities  Complete steroid taper for 15 days - 40 mg x 3d, 30 mg x 3d, 20 mg x 3d, 10 mg x 3d, 5 mg 3d and stop  Continue Afrin spray for 2 more days. Do not use longer than that  Continue nasal spray and flonase every day - might need follow up with ENT after Pulmonology follow up in 2 weeks  Avoid allergens  Follow up with PCP in 1 week  Follow up labs: nonw  Follow up imaging: none    Note that over 30 minutes was spent in preparing discharge papers, discussing discharge with patient, medication review, etc.      Serena Mosquera MD,   Internal Medicine Resident, PGY-1  [address] [place], [place]  4/6/2021, 1:58 AM      Attending Physician Statement  I have discussed the care of Sony Means, including pertinent history and exam findings with the resident. I have reviewed the key elements of all parts of the encounter with the resident. I have seen and examined the patient with the resident on the day of discharge  I agree with the assessment and plan and status of the problem list as documented. Please note that this chart was generated using voice recognition Dragon dictation software. Although every effort was made to ensure the accuracy of this automated transcription, some errors in transcription may have occurred.      Wing Graeme MD  4/6/2021 5:16 PM

## 2021-04-28 ENCOUNTER — TELEPHONE (OUTPATIENT)
Dept: FAMILY MEDICINE CLINIC | Age: 61
End: 2021-04-28

## 2021-04-28 NOTE — TELEPHONE ENCOUNTER
Patient has been exposed to [place] from daughter in law, was just notified she was POS today but she has been around her for the past few days and went a got a home kit test but tested NEG. But she has been having sx of it. What protocol does she need to take for herself and grand kids ?

## 2021-06-08 ENCOUNTER — OFFICE VISIT (OUTPATIENT)
Dept: PULMONOLOGY | Age: 61
End: 2021-06-08
Payer: COMMERCIAL

## 2021-06-08 VITALS
OXYGEN SATURATION: 98 % | WEIGHT: 140 LBS | TEMPERATURE: 98.4 F | DIASTOLIC BLOOD PRESSURE: 67 MMHG | RESPIRATION RATE: 16 BRPM | SYSTOLIC BLOOD PRESSURE: 109 MMHG | BODY MASS INDEX: 23.9 KG/M2 | HEIGHT: 64 IN | HEART RATE: 91 BPM

## 2021-06-08 DIAGNOSIS — J41.0 SIMPLE CHRONIC BRONCHITIS (HCC): Primary | ICD-10-CM

## 2021-06-08 DIAGNOSIS — F17.200 SMOKING: ICD-10-CM

## 2021-06-08 PROCEDURE — 99214 OFFICE O/P EST MOD 30 MIN: CPT | Performed by: INTERNAL MEDICINE

## 2021-06-08 ASSESSMENT — SLEEP AND FATIGUE QUESTIONNAIRES
HOW LIKELY ARE YOU TO NOD OFF OR FALL ASLEEP WHEN YOU ARE A PASSENGER IN A CAR FOR AN HOUR WITHOUT A BREAK: 0
HOW LIKELY ARE YOU TO NOD OFF OR FALL ASLEEP WHILE SITTING QUIETLY AFTER LUNCH WITHOUT ALCOHOL: 0
HOW LIKELY ARE YOU TO NOD OFF OR FALL ASLEEP WHILE LYING DOWN TO REST IN THE AFTERNOON WHEN CIRCUMSTANCES PERMIT: 0
HOW LIKELY ARE YOU TO NOD OFF OR FALL ASLEEP WHILE SITTING INACTIVE IN A PUBLIC PLACE: 0
ESS TOTAL SCORE: 0
HOW LIKELY ARE YOU TO NOD OFF OR FALL ASLEEP IN A CAR, WHILE STOPPED FOR A FEW MINUTES IN TRAFFIC: 0
HOW LIKELY ARE YOU TO NOD OFF OR FALL ASLEEP WHILE WATCHING TV: 0
HOW LIKELY ARE YOU TO NOD OFF OR FALL ASLEEP WHILE SITTING AND TALKING TO SOMEONE: 0
HOW LIKELY ARE YOU TO NOD OFF OR FALL ASLEEP WHILE SITTING AND READING: 0

## 2021-06-08 NOTE — PROGRESS NOTES
Romulo Minor  6/8/2021    Chief Complaint   Patient presents with    Asthma     breathing is fine    asthmatic bronchitis  systemic hypertension    Romulo Minor  presented for follow up for asthmatic bronchitis. Unfortunately she continued to smoke in spite of repeated advised to give up smoking. She however has started on the amount of smoking. There is no evidence of acute exacerbation no fever no yellow sputum no excessive cough or sputum or wheezing. No chest pain no pedal edema no thromboembolic process. No sore throat no nasal stuffiness or sinusitis. She is known to have hypertension that is under good control there is no evidence of any PND or angina. She has not noted any pedal edema    At this time she is not taking any bronchodilators except for the use of her rescue inhaler Proventil. That she does not have the use very frequently as well. Overall have bilateral pulmonary status very stable. She already had taken Covid vaccine. Review of Systems -  General ROS: negative for - chills, fatigue, fever or weight loss  ENT ROS: negative for - headaches, oral lesions or sore throat  Cardiovascular ROS: no chest pain , orthopnea or pnd   Gastrointestinal ROS: no abdominal pain, change in bowel habits, or black or bloody stools  Skin - no rash   Neuro - no blurry vision , no loc . No focal weakness   msk - no jt tenderness or swelling    Vascular - no claudication , rest completed and negative   Lymphatic - complete and negative   Hematology - oncology - complete and negative   Allergy immunology - complete and negative    no burning or hematuria       LUNG CANCER SCREENING     1. CRITERIA MET    []     CT ORDERED  []      2. CRITERIA NOT MET   [x]      3. REFUSED                    []        REASON CRITERIA NOT MET     1. SMOKING LESS THAN 30 PY  []      2. AGE LESS THAN 55 or GREATER 77 YEARS  []      3.  QUIT SMOKING 15 YEARS OR GREATER   []      4. RECENT CT WITH IN 6 MONTHS    []      5. LIFE EXPECTANCY < 5 YEARS   []      6. SIGNS  AND SYMPTOMS OF LUNG CANCER   []           Immunization   Immunization History   Administered Date(s) Administered    Influenza Vaccine, unspecified formulation 10/28/2017, 10/06/2018    Influenza Virus Vaccine 12/22/2012, 01/04/2014, 01/16/2014, 09/13/2014, 08/15/2015, 10/30/2016    Influenza, MDCK Quadv, IM, PF (Flucelvax 4 yrs and older) 11/26/2019    Influenza, Dene Schlein, IM, (6 mo and older Fluzone, Flulaval, Fluarix and 3 yrs and older Afluria) 10/30/2016    Pneumococcal Polysaccharide ([id number]) 01/16/2014    Tdap (Boostrix, Adacel) 11/24/2015        Pneumococcal Vaccine     [x] Up to date    [] Indicated   [] Refused  [] Contraindicated       Influenza Vaccine   [x] Up to date    [] Indicated   [] Refused  [] Contraindicated   had taken Covid vaccine PAST MEDICAL HISTORY:         Diagnosis Date    Acute asthma exacerbation 3/29/2021    Acute diverticulitis 2/8/2021    Acute respiratory failure with hypoxia ([facility] [address]     Asthma 4/26/2013    COVID-19 virus antibody negative 08/11/2020    negative    Essential hypertension     Former smoker     Gastroesophageal reflux disease     Hypertension 9/3/2014    Moderate persistent asthma without complication     Pneumonia of right lower lobe due to infectious organism 2/8/2021    Post-nasal drip        Family History:   History reviewed. No pertinent family history. SURGICAL HISTORY:   History reviewed. No pertinent surgical history. Not in a hospital admission.   Allergies   Allergen Reactions    Clindamycin/Lincomycin     Codeine     Dye [Iodides]     Iodine     Levofloxacin     Levofloxacin      Shortness of breath    Pcn [Penicillins]      Shortness of breath    Pollen Extract Itching    Proair Respiclick [Albuterol Sulfate]      Per patient \"allergic to the propellant\"    Sulfa Antibiotics      Redness , shortness of breath    Sulfites      Social History Tobacco Use   Smoking Status Former Smoker    Packs/day: 0.25    Years: 0.50    Pack years: 0.12    Quit date: 2012    Years since quittin.4   Smokeless Tobacco Never Used     Prior to Admission medications    Medication Sig Start Date End Date Taking? Authorizing Provider   ipratropium-albuterol (DUONEB) 0.5-2.5 (3) MG/3ML SOLN nebulizer solution Inhale 3 mLs into the lungs three times daily 3/31/21  Yes Nohemi aSntillan MD   sodium chloride (OCEAN) 0.65 % nasal spray 1 spray by Nasal route as needed for Congestion 3/31/21  Yes Nohemi Santillan MD   famotidine (PEPCID) 20 MG tablet Take 1 tablet by mouth 2 times daily 3/31/21  Yes Nohemi Santillan MD   guaiFENesin (ROBITUSSIN) 100 MG/5ML SOLN oral solution Take 200 mg by mouth every 4 hours as needed for Cough   Yes Historical Provider, MD   fluticasone-salmeterol (ADVAIR) 500-50 MCG/DOSE diskus inhaler Inhale 1 puff into the lungs every 12 hours 21  Yes Maxine Claire MD   albuterol sulfate HFA (VENTOLIN HFA) 108 (90 Base) MCG/ACT inhaler inhale 2 puffs by mouth every 6 hours if needed for wheezing 21  Yes Guerline Hicks MD   enalapril-hydroCHLOROthiazide (VASERETIC) 10-25 MG per tablet take 1 tablet by mouth once daily 12/3/20  Yes Maxine Claire MD   montelukast (SINGULAIR) 10 MG tablet Take 1 tablet by mouth nightly take 1 tablet by mouth every evening 20  Yes Maxine Claire MD   fluticasone (FLONASE) 50 MCG/ACT nasal spray 1 spray by Each Nostril route daily   Yes Historical Provider, MD   Multiple Vitamins-Minerals (THERAPEUTIC MULTIVITAMIN-MINERALS) tablet Take 1 tablet by mouth daily   Yes Historical Provider, MD   Loratadine (CLARITIN) 10 MG CAPS Take  by mouth.    Yes Historical Provider, MD   Nebulizers (COMPRESSOR/NEBULIZER) MISC 1 each by Does not apply route 4 times daily for 14 days Use with medications as directed 2/26/19 3/12/19  Maxine Claire MD         Physical Exam  General Appearance: Alert, cooperative, no distress, appears stated age no respiratory distress not using accessory muscles no fever   Head:    Normocephalic, without obvious abnormality, atraumatic   eye examination revealed no jaundice no [place] syndrome    nose revealed no polyps no sinus tenderness    throat examination is unremarkable. Gait examination is unremarkable   :    Neck:   Supple, symmetrical, trachea midline, no adenopathy;     thyroid:  no enlargement/tenderness/nodules; no carotid    bruit or JVD   Back:     Symmetric, no curvature, ROM normal, no CVA tenderness   Lungs:    AP diameter is increased percussion note is hyperresonant expiration prolonged no rales rhonchi are audible   Chest Wall:    No tenderness or deformity      Heart:    Regular rate and rhythm, S1 and S2 normal, no murmur, rub        or gallop no rvh                           Abdomen:                                                 Pulses:                                            Lymph nodes:                    Neurologic:                  Soft, non-tender, bowel sounds active all four quadrants,     no masses, no organomegaly         2+ and symmetric all extremities            Cervical, supraclavicular not enlarged or matted or tender      CNII-XII intact, normal strength 5/5 . Sensation grossly normal  and reflexes normal 2+  throughout     Clubbing No  Lower ext edema No1+   [] , 2 +  [] , 3+   []  Upper ext edema No       Musculoskeletal - no joint swelling or tenderness or synovitis               /67 (Site: Right Upper Arm, Position: Sitting, Cuff Size: Medium Adult)   Pulse 91   Temp 98.4 °F (36.9 °C) (Temporal)   Resp 16   Ht 5' 4\" (1.626 m)   Wt 140 lb (63.5 kg)   SpO2 98% Comment: room air at rest  BMI 24.03 kg/m²     CXR  no recent chest x-ray      CT Scans  since CT    Echo  no recent echo        Assessment   Diagnosis Orders   1.  Simple chronic bronchitis (HCC)     2. Smoking     hypertension  asthmatic bronchitis  Plan:  Pulmonary status very stable. I advised her to stop smoking altogether. She already had flu Covid vaccine    She had flu vaccine    She had Pneumovax    We will continue to monitor her for any lung cancer. She will continue use of albuterol on as-needed basis. This is controlling her symptoms very well. I however advised her to be careful about the hard humid weather and if her symptoms do get worse she will go back to the inhaled steroids and or Spiriva. I plan to see her follow-up in few months      Electronically signed by Nory Nj MD on   6/8/21 at 2:47 PM EDT  Please note that this chart was generated using voice recognition Dragon dictation software. Although every effort was made to ensure the accuracy of this automated transcription, some errors in transcription may have occurred.

## 2021-06-21 ENCOUNTER — OFFICE VISIT (OUTPATIENT)
Dept: FAMILY MEDICINE CLINIC | Age: 61
End: 2021-06-21
Payer: COMMERCIAL

## 2021-06-21 VITALS
DIASTOLIC BLOOD PRESSURE: 80 MMHG | OXYGEN SATURATION: 95 % | WEIGHT: 140.2 LBS | BODY MASS INDEX: 24.07 KG/M2 | SYSTOLIC BLOOD PRESSURE: 132 MMHG | HEART RATE: 88 BPM

## 2021-06-21 DIAGNOSIS — J45.41 MODERATE PERSISTENT ASTHMA WITH ACUTE EXACERBATION: ICD-10-CM

## 2021-06-21 DIAGNOSIS — I10 ESSENTIAL HYPERTENSION: Primary | ICD-10-CM

## 2021-06-21 PROBLEM — J01.90 ACUTE SINUSITIS: Status: RESOLVED | Noted: 2021-03-30 | Resolved: 2021-06-21

## 2021-06-21 PROBLEM — J18.9 PNEUMONIA OF RIGHT LOWER LOBE DUE TO INFECTIOUS ORGANISM: Status: RESOLVED | Noted: 2021-02-08 | Resolved: 2021-06-21

## 2021-06-21 PROBLEM — E87.1 HYPONATREMIA: Status: RESOLVED | Noted: 2021-02-08 | Resolved: 2021-06-21

## 2021-06-21 PROBLEM — J45.901 ACUTE ASTHMA EXACERBATION: Status: RESOLVED | Noted: 2021-03-29 | Resolved: 2021-06-21

## 2021-06-21 PROBLEM — K57.92 ACUTE DIVERTICULITIS: Status: RESOLVED | Noted: 2021-02-08 | Resolved: 2021-06-21

## 2021-06-21 PROCEDURE — 99214 OFFICE O/P EST MOD 30 MIN: CPT | Performed by: FAMILY MEDICINE

## 2021-06-21 ASSESSMENT — PATIENT HEALTH QUESTIONNAIRE - PHQ9
SUM OF ALL RESPONSES TO PHQ QUESTIONS 1-9: 0
1. LITTLE INTEREST OR PLEASURE IN DOING THINGS: 0
SUM OF ALL RESPONSES TO PHQ QUESTIONS 1-9: 0
SUM OF ALL RESPONSES TO PHQ9 QUESTIONS 1 & 2: 0
SUM OF ALL RESPONSES TO PHQ QUESTIONS 1-9: 0
2. FEELING DOWN, DEPRESSED OR HOPELESS: 0

## 2021-06-21 NOTE — PROGRESS NOTES
Subjective:  Gen Crowley presents for   Chief Complaint   Patient presents with    6 Month Follow-Up     Tolerating the mds. She is doing well    See's pulmonary routinely. Is acitive and is able to accomplish everything she needs to do. She now has custody of her 4 grandchildren and is VERY busy    Patient Active Problem List   Diagnosis    Right sided sciatica    Asthma    Hypertension    Abnormal drug screen    Acute diverticulitis    Pneumonia of right lower lobe due to infectious organism    Hyponatremia    Acute asthma exacerbation    Acute sinusitis    Acute respiratory failure with hypoxia ([address]         Review of Systems:  · General: no significant weight changes. · Respiratory: no cough, pleuritic chest pain, dyspnea, or wheezing  · Cardiovascular: no pain, MCGRATH, orthopnea, palpitations or claudication  · Gastrointestinal: no chronic nausea, vomiting, heartburn, diarrhea, constipation, bloating, or abdominal pain. No bloody or black stools. Objective:  Physical Exam   Vitals:   Vitals:    06/21/21 1441   BP: 132/80   Site: Right Upper Arm   Position: Sitting   Cuff Size: Small Adult   Pulse: 88   SpO2: 95%   Weight: 140 lb 3.2 oz (63.6 kg)     Wt Readings from Last 3 Encounters:   06/21/21 140 lb 3.2 oz (63.6 kg)   06/08/21 140 lb (63.5 kg)   03/29/21 149 lb 11.2 oz (67.9 kg)     Ht Readings from Last 3 Encounters:   06/08/21 5' 4\" (1.626 m)   03/29/21 5' 6\" (1.676 m)   12/03/20 5' 4\" (1.626 m)     Body mass index is 24.07 kg/m². Constitutional: She is oriented to person, place, and time. She appears well-developed and well-nourished and in no acute distress. Answers all my questions appropriately. Head: Normocephalic and atraumatic. Eyes:conjunctiva appear normal.  Nose: pink, non-edematous mucosa. No polyps. No septal deviation  Throat: no erythema, tonsillar hypertrophy or exudate. No ulcerations noted. Lips/Teeth/Gums all appear normal.  Neck: Normal range of motion.  Neck supple. No tracheal deviation present. No abnormal lymphadenopathy. No JVD noted. Carotids are clear bilaterally. No thyroid masses noted. Heart: RRR without murmur. No S3, S4, or gallop noted. Chest: Clear to auscultation bilaterally. Good breath sounds noted. No rales, wheezes, or rhonchi noted. No respiratory retractions noted. Wall has symmetrical movement with respirations. No pedal edema  Assessment:   Encounter Diagnosis   Name Primary?  Essential hypertension Yes         Plan:   There are no discontinued medications. THE ABOVE NOTED DISCONTINUED MEDS MAY ONLY BE FROM 'CLEANING UP' THE MED LIST AND WERE NOT ACTUALLY CANCELED;  SEE CHART FOR DETAILS! No orders of the defined types were placed in this encounter. Orders Placed This Encounter   Procedures    Comprehensive Metabolic Panel     Standing Status:   Future     Standing Expiration Date:   6/21/2022    Lipid Panel     Standing Status:   Future     Standing Expiration Date:   6/21/2022     Order Specific Question:   Is Patient Fasting?/# of Hours     Answer:   0     No follow-ups on file. There are no Patient Instructions on file for this visit. Data Unavailable      No changes in meds.     rv in 6 months

## 2021-09-07 ENCOUNTER — TELEPHONE (OUTPATIENT)
Dept: FAMILY MEDICINE CLINIC | Age: 61
End: 2021-09-07

## 2021-12-03 ENCOUNTER — APPOINTMENT (OUTPATIENT)
Dept: GENERAL RADIOLOGY | Age: 61
End: 2021-12-03
Payer: COMMERCIAL

## 2021-12-03 ENCOUNTER — HOSPITAL ENCOUNTER (EMERGENCY)
Age: 61
Discharge: HOME | End: 2021-12-03
Attending: EMERGENCY MEDICINE
Payer: COMMERCIAL

## 2021-12-03 VITALS
TEMPERATURE: 97.6 F | DIASTOLIC BLOOD PRESSURE: 64 MMHG | SYSTOLIC BLOOD PRESSURE: 122 MMHG | WEIGHT: 140 LBS | HEART RATE: 94 BPM | BODY MASS INDEX: 24.03 KG/M2 | OXYGEN SATURATION: 91 % | RESPIRATION RATE: 18 BRPM

## 2021-12-03 DIAGNOSIS — J45.21 MILD INTERMITTENT ASTHMA WITH EXACERBATION: Primary | ICD-10-CM

## 2021-12-03 LAB
ABSOLUTE EOS #: 1.07 K/UL (ref 0–0.44)
ABSOLUTE IMMATURE GRANULOCYTE: <0.03 K/UL (ref 0–0.3)
ABSOLUTE LYMPH #: 2.3 K/UL (ref 1.1–3.7)
ABSOLUTE MONO #: 0.65 K/UL (ref 0.1–1.2)
ANION GAP SERPL CALCULATED.3IONS-SCNC: 10 MMOL/L (ref 9–17)
BASOPHILS # BLD: 1 % (ref 0–2)
BASOPHILS ABSOLUTE: 0.08 K/UL (ref 0–0.2)
BUN BLDV-MCNC: 12 MG/DL (ref 8–23)
BUN/CREAT BLD: ABNORMAL (ref 9–20)
CALCIUM SERPL-MCNC: 8.9 MG/DL (ref 8.6–10.4)
CHLORIDE BLD-SCNC: 104 MMOL/L (ref 98–107)
CO2: 21 MMOL/L (ref 20–31)
CREAT SERPL-MCNC: 0.46 MG/DL (ref 0.5–0.9)
DIFFERENTIAL TYPE: ABNORMAL
DIRECT EXAM: NORMAL
EOSINOPHILS RELATIVE PERCENT: 14 % (ref 1–4)
GFR AFRICAN AMERICAN: >60 ML/MIN
GFR NON-AFRICAN AMERICAN: >60 ML/MIN
GFR SERPL CREATININE-BSD FRML MDRD: ABNORMAL ML/MIN/{1.73_M2}
GFR SERPL CREATININE-BSD FRML MDRD: ABNORMAL ML/MIN/{1.73_M2}
GLUCOSE BLD-MCNC: 106 MG/DL (ref 70–99)
HCT VFR BLD CALC: 39.5 % (ref 36.3–47.1)
HEMOGLOBIN: 13.6 G/DL (ref 11.9–15.1)
IMMATURE GRANULOCYTES: 0 %
LYMPHOCYTES # BLD: 30 % (ref 24–43)
Lab: NORMAL
MCH RBC QN AUTO: 31.1 PG (ref 25.2–33.5)
MCHC RBC AUTO-ENTMCNC: 34.4 G/DL (ref 28.4–34.8)
MCV RBC AUTO: 90.4 FL (ref 82.6–102.9)
MONOCYTES # BLD: 8 % (ref 3–12)
NRBC AUTOMATED: 0 PER 100 WBC
PDW BLD-RTO: 12.9 % (ref 11.8–14.4)
PLATELET # BLD: 301 K/UL (ref 138–453)
PLATELET ESTIMATE: ABNORMAL
PMV BLD AUTO: 9 FL (ref 8.1–13.5)
POTASSIUM SERPL-SCNC: 4.1 MMOL/L (ref 3.7–5.3)
RBC # BLD: 4.37 M/UL (ref 3.95–5.11)
RBC # BLD: ABNORMAL 10*6/UL
SARS-COV-2, RAPID: NOT DETECTED
SEG NEUTROPHILS: 47 % (ref 36–65)
SEGMENTED NEUTROPHILS ABSOLUTE COUNT: 3.68 K/UL (ref 1.5–8.1)
SODIUM BLD-SCNC: 135 MMOL/L (ref 135–144)
SPECIMEN DESCRIPTION: NORMAL
SPECIMEN DESCRIPTION: NORMAL
WBC # BLD: 7.8 K/UL (ref 3.5–11.3)
WBC # BLD: ABNORMAL 10*3/UL

## 2021-12-03 PROCEDURE — 85025 COMPLETE CBC W/AUTO DIFF WBC: CPT

## 2021-12-03 PROCEDURE — 2700000000 HC OXYGEN THERAPY PER DAY

## 2021-12-03 PROCEDURE — 80048 BASIC METABOLIC PNL TOTAL CA: CPT

## 2021-12-03 PROCEDURE — 71045 X-RAY EXAM CHEST 1 VIEW: CPT

## 2021-12-03 PROCEDURE — 99285 EMERGENCY DEPT VISIT HI MDM: CPT

## 2021-12-03 PROCEDURE — 87635 SARS-COV-2 COVID-19 AMP PRB: CPT

## 2021-12-03 PROCEDURE — 87804 INFLUENZA ASSAY W/OPTIC: CPT

## 2021-12-03 PROCEDURE — 94761 N-INVAS EAR/PLS OXIMETRY MLT: CPT

## 2021-12-03 PROCEDURE — 94640 AIRWAY INHALATION TREATMENT: CPT

## 2021-12-03 PROCEDURE — 6360000002 HC RX W HCPCS: Performed by: HEALTH CARE PROVIDER

## 2021-12-03 RX ORDER — PREDNISONE 50 MG/1
50 TABLET ORAL DAILY
Qty: 5 TABLET | Refills: 0 | Status: SHIPPED | OUTPATIENT
Start: 2021-12-03 | End: 2021-12-08

## 2021-12-03 RX ADMIN — IPRATROPIUM BROMIDE 0.5 MG: 0.5 SOLUTION RESPIRATORY (INHALATION) at 03:18

## 2021-12-03 ASSESSMENT — ENCOUNTER SYMPTOMS
SORE THROAT: 0
NAUSEA: 0
WHEEZING: 1
ABDOMINAL PAIN: 0
CHEST TIGHTNESS: 1
DIARRHEA: 0
VOMITING: 0
CONSTIPATION: 0
COUGH: 0
SHORTNESS OF BREATH: 1
STRIDOR: 0

## 2021-12-03 NOTE — ED PROVIDER NOTES
[facility] ED  Emergency Department Encounter  Emergency Medicine Resident     Pt Name: Brock Whitaker  MRN: [id number]  [place] 1960  Date of evaluation: 12/3/21  PCP:  Van Biswas MD    [address] [place] [address]       Chief Complaint   Patient presents with    Asthma     difficulty breathing, hx of       HISTORY OFPRESENT ILLNESS  (Location/Symptom, Timing/Onset, Context/Setting, Quality, Duration, Modifying Factors,Severity.)      Brock Whitaker is a 62 y.o. female who presents via EMS for worsening shortness of breath. Patient has a history of asthma and has had some increased shortness of breath the last couple of days as well as dry cough. 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 Denies any fevers, chills, chest pain, abdominal pain, nausea, upper lower extremity weakness or numbness. Patient received Solu-Medrol and magnesium in route via EMS. At time of initial evaluation patient is uncomfortable appearing, in mild distress, but otherwise is speaking in full sentences and was saturating at 94 to 95% on 2L O2. PAST MEDICAL / SURGICAL / SOCIAL / FAMILY HISTORY      has a past medical history of Acute asthma exacerbation, Acute diverticulitis, Acute respiratory failure with hypoxia ([address] Asthma, COVID-19 virus antibody negative, Essential hypertension, Former smoker, Gastroesophageal reflux disease, Hypertension, Moderate persistent asthma without complication, Pneumonia of right lower lobe due to infectious organism, and Post-nasal drip.     has no past surgical history on file.     Social History     Socioeconomic History    Marital status:      Spouse name: Not on file    Number of children: Not on file    Years of education: Not on file    Highest education level: Not on file   Occupational History    Not on file   Tobacco Use    Smoking status: Former Smoker     Packs/day: 0.25     Years: 0.50     Pack years: 0.12     Quit date: 2012     Years since quittin.9    Smokeless tobacco: Never Used   Vaping Use    Vaping Use: Never used   Substance and Sexual Activity    Alcohol use: No    Drug use: No    Sexual activity: Yes     Partners: Male   Other Topics Concern    Not on file   Social History Narrative    Not on file     Social Determinants of Health     Financial Resource Strain: Low Risk     Difficulty of Paying Living Expenses: Not hard at all   Food Insecurity: No Food Insecurity    Worried About [employer] [employer] in the Last Year: Never true    [employer] in the Last Year: Never true   Transportation Needs: No Transportation Needs    Lack of Transportation (Medical): No    Lack of Transportation (Non-Medical): No   Physical Activity:     Days of Exercise per Week: Not on file    Minutes of Exercise per Session: Not on file   Stress:     Feeling of Stress : Not on file   Social Connections:     Frequency of Communication with Friends and Family: Not on file    Frequency of Social Gatherings with Friends and Family: Not on file    Attends Advent Services: Not on file    Active Member of Clubs or Organizations: Not on file    Attends Club or Organization Meetings: Not on file    Marital Status: Not on file   Intimate Partner Violence:     Fear of Current or Ex-Partner: Not on file    Emotionally Abused: Not on file    Physically Abused: Not on file    Sexually Abused: Not on file   Housing Stability:     Unable to Pay for Housing in the Last Year: Not on file    Number of [place] in the Last Year: Not on file    Unstable Housing in the Last Year: Not on file       No family history on file.     Allergies:  Clindamycin/lincomycin, Codeine, Dye [iodides], Iodine, Levofloxacin, Levofloxacin, Pcn [penicillins], Pollen extract, Proair respiclick [albuterol sulfate], Sulfa antibiotics, and Sulfites    Home Medications:  Prior to Admission medications    Medication Sig Start Date End Date Taking? Authorizing Provider   predniSONE (DELTASONE) 50 MG tablet Take 1 tablet by mouth daily for 5 days 12/3/21 12/8/21 Yes Luz Marina Johnson DO   ipratropium-albuterol (DUONEB) 0.5-2.5 (3) MG/3ML SOLN nebulizer solution Inhale 3 mLs into the lungs three times daily 3/31/21   Sage Hatfield MD   sodium chloride (OCEAN) 0.65 % nasal spray 1 spray by Nasal route as needed for Congestion 3/31/21   Sage Hatfield MD   famotidine (PEPCID) 20 MG tablet Take 1 tablet by mouth 2 times daily 3/31/21   Sage Hatfield MD   guaiFENesin (ROBITUSSIN) 100 MG/5ML SOLN oral solution Take 200 mg by mouth every 4 hours as needed for Cough    Historical Provider, MD   fluticasone-salmeterol (ADVAIR) 500-50 MCG/DOSE diskus inhaler Inhale 1 puff into the lungs every 12 hours 2/18/21   Edis Carr MD   albuterol sulfate HFA (VENTOLIN HFA) 108 (90 Base) MCG/ACT inhaler inhale 2 puffs by mouth every 6 hours if needed for wheezing 1/4/21   Giancarlo Fu MD   enalapril-hydroCHLOROthiazide (VASERETIC) 10-25 MG per tablet take 1 tablet by mouth once daily 12/3/20   Edis Carr MD   montelukast (SINGULAIR) 10 MG tablet Take 1 tablet by mouth nightly take 1 tablet by mouth every evening 11/2/20   Edis Carr MD   fluticasone (FLONASE) 50 MCG/ACT nasal spray 1 spray by Each Nostril route daily    Historical Provider, MD   Multiple Vitamins-Minerals (THERAPEUTIC MULTIVITAMIN-MINERALS) tablet Take 1 tablet by mouth daily    Historical Provider, MD   Nebulizers (COMPRESSOR/NEBULIZER) MISC 1 each by Does not apply route 4 times daily for 14 days Use with medications as directed 2/26/19 3/12/19  Edis Carr MD   Loratadine (CLARITIN) 10 MG CAPS Take  by mouth.     Historical Provider, MD       REVIEW OF SYSTEMS    (2-9 systems for level 4, 10 or more for level 5) Review of Systems   Constitutional: Negative for chills and fever. HENT: Negative for ear pain, hearing loss and sore throat. Eyes: Negative for visual disturbance. Respiratory: Positive for chest tightness, shortness of breath and wheezing. Negative for cough and stridor. Cardiovascular: Negative for chest pain. Gastrointestinal: Negative for abdominal pain, constipation, diarrhea, nausea and vomiting. Genitourinary: Negative for difficulty urinating and dysuria. Musculoskeletal: Negative for arthralgias and myalgias. Neurological: Negative for numbness. Psychiatric/Behavioral: Negative for agitation and confusion. PHYSICAL EXAM   (up to 7 for level 4, 8 or more for level 5)     INITIAL VITALS:    weight is 140 lb (63.5 kg). Her oral temperature is 97.6 °F (36.4 °C). Her blood pressure is 122/64 and her pulse is 94. Her respiration is 18 and oxygen saturation is 91%. Physical Exam  Vitals and nursing note reviewed. Constitutional:       Appearance: She is well-developed. She is not diaphoretic. Comments: Mild distress, appears uncomfortable   HENT:      Head: Normocephalic and atraumatic. Right Ear: External ear normal.      Left Ear: External ear normal.      Nose: Nose normal.   Eyes:      Conjunctiva/sclera: Conjunctivae normal.   Neck:      Trachea: No tracheal deviation. Cardiovascular:      Rate and Rhythm: Normal rate and regular rhythm. Heart sounds: Normal heart sounds. No murmur heard. No friction rub. No gallop. Pulmonary:      Effort: Pulmonary effort is normal. No respiratory distress. Breath sounds: Wheezing present. Abdominal:      General: Bowel sounds are normal.      Palpations: Abdomen is soft. Tenderness: There is no abdominal tenderness. Musculoskeletal:         General: No tenderness. Normal range of motion. Cervical back: Neck supple. Skin:     General: Skin is warm and dry.       Capillary Refill: Capillary refill takes less than 2 seconds. Neurological:      Mental Status: She is alert and oriented to person, place, and time. Motor: No abnormal muscle tone. DIFFERENTIAL  DIAGNOSIS     PLAN (LABS / IMAGING / EKG):  Orders Placed This Encounter   Procedures    COVID-19, Rapid    RAPID INFLUENZA A/B ANTIGENS    XR CHEST PORTABLE    CBC WITH AUTO DIFFERENTIAL    BASIC METABOLIC PANEL       MEDICATIONS ORDERED:  Orders Placed This Encounter   Medications    DISCONTD: ipratropium (ATROVENT) 0.02 % nebulizer solution 0.5 mg     Order Specific Question:   Initiate RT Bronchodilator Protocol     Answer: Yes    ipratropium (ATROVENT) 0.02 % nebulizer solution 0.5 mg     Order Specific Question:   Initiate RT Bronchodilator Protocol     Answer: Yes    predniSONE (DELTASONE) 50 MG tablet     Sig: Take 1 tablet by mouth daily for 5 days     Dispense:  5 tablet     Refill:  0       DDX: Asthma exacerbation, COPD exacerbation, pneumonia, pulmonary embolism, ACS, influenza, COVID-19, other viral syndrome. Initial MDM/Plan: 62 y.o. female who presents with acute asthma exacerbation. Will obtain CBC, BMP, chest x-ray, Covid and influenza test.  We will proceed with breathing treatments at this time, patient already received Solu-Medrol and magnesium while in route via EMS. Will reassess how patient is doing after breathing treatments. Able to wean off of O2 patient is appearing comfortable and feels comfortable going home, will discuss potential discharge. Otherwise, plan for admit if no improvement/worsening clinical status.     DIAGNOSTIC RESULTS / EMERGENCY DEPARTMENT COURSE / MDM     LABS:  Labs Reviewed   CBC WITH AUTO DIFFERENTIAL - Abnormal; Notable for the following components:       Result Value    Eosinophils % 14 (*)     Absolute Eos # 1.07 (*)     All other components within normal limits   BASIC METABOLIC PANEL - Abnormal; Notable for the following components:    Glucose 106 (*)     CREATININE 0.46 (*)     All other components within normal limits   COVID-19, RAPID   RAPID INFLUENZA A/B ANTIGENS         RADIOLOGY:  XR CHEST PORTABLE    Result Date: 12/3/2021  EXAMINATION: ONE XRAY VIEW OF THE CHEST 12/3/2021 3:09 am COMPARISON: 03/30/2021 HISTORY: ORDERING SYSTEM PROVIDED HISTORY: SOB TECHNOLOGIST PROVIDED HISTORY: SOB Reason for Exam: uprt FINDINGS: Cardiomediastinal silhouette within normal limits. Lungs and costophrenic sulci are clear. No pneumothorax or subdiaphragmatic free air. No acute osseous abnormality identified. Remote healed right-sided rib fractures redemonstrated. No radiographic evidence of acute cardiopulmonary disease. EMERGENCY DEPARTMENT COURSE:  Covid negative. Flu negative. CBC, BMP unremarkable. Chest x-ray unremarkable. Patient felt improvement after breathing treatments. Patient was weaned off of O2 and saturating at 90 to 91%. Given patient's chronic asthma history as well as smoking history, it is likely that patient likely lives around his O2 saturation. Patient was able to ambulate without any complications, or increase shortness of breath. Engaged in shared decision-making with patient, states that she would like to go home at this time, she has a O2 monitor at home, has a sufficient amount of medications, sufficient support at home. Discussed strict return precautions, including but not limited to, severe shortness of breath, chest pain, dizziness, lightheadedness, loss of consciousness. Patient will be sent home with prednisone burst.  Follow-up with primary care in the next 2 to 3 days. Patient verbalized understanding and had no further questions at time of discharge. PROCEDURES:  None    CONSULTS:  None    CRITICAL CARE:  Please see attending note    FINAL IMPRESSION      1.  Mild intermittent asthma with exacerbation        DISPOSITION / PLAN     DISPOSITION Decision To Discharge 12/03/2021 06:25:05 AM    PATIENTREFERRED TO:  Consuelo Vargas MD  [address] [facility] [address] [place] [address]  [phone]    Schedule an appointment as soon as possible for a visit in 2 days  To discuss    [facility] ED  [address] [facility] [id number] [phone]    As needed, If symptoms worsen      DISCHARGE MEDICATIONS:  Discharge Medication List as of 12/3/2021  6:29 AM      START taking these medications    Details   predniSONE (DELTASONE) 50 MG tablet Take 1 tablet by mouth daily for 5 days, Disp-5 tablet, R-0Print             Roe Cote DO  EmergencyMedicine Resident    (Please note that portions of this note were completed with a voice recognition program.  Efforts were made to edit the dictations but occasionally words are mis-transcribed.)     Demetria Gordon DO  Resident  12/03/21 [id number]

## 2021-12-03 NOTE — ED NOTES
Pt arrived to ED via EMS with c/o of difficulty breathing. Pt has hx of asthma. Pt was given mag and solumedrol en route. Pt states she has been using her rescue inhalers a lot recently. Pt is on 4L NC. Pt is alert and oriented x4.      Larry Diez RN  12/03/21 0209

## 2021-12-03 NOTE — ED NOTES
Bed: 25  Expected date:   Expected time:   Means of arrival:   Comments:  [address] Magi Nguyen,Everardo, RN  12/03/21 7350

## 2021-12-14 NOTE — ED PROVIDER NOTES
[facility]   Emergency Department  Faculty Attestation       I performed a history and physical examination of the patient and discussed management with the resident. I reviewed the residents note and agree with the documented findings including all diagnostic interpretations and plan of care. Any areas of disagreement are noted on the chart. I was personally present for the key portions of any procedures. I have documented in the chart those procedures where I was not present during the key portions. I have reviewed the emergency nurses triage note. I agree with the chief complaint, past medical history, past surgical history, allergies, medications, social and family history as documented unless otherwise noted below. Documentation of the HPI, Physical Exam and Medical Decision Making performed by scribdiya is based on my personal performance of the HPI, PE and MDM. For Physician Assistant/ Nurse Practitioner cases/documentation I have personally evaluated this patient and have completed at least one if not all key elements of the E/M (history, physical exam, and MDM). Additional findings are as noted. Pertinent Comments     Primary Care Physician: Zunilda Domínguez MD      ED Triage Vitals [12/03/21 0240]   BP Temp Temp Source Pulse Resp SpO2 Height Weight   (!) 139/94 97.6 °F (36.4 °C) Oral 84 20 93 % -- 140 lb (63.5 kg)        History/Physical: This is a 62 y.o. female with past medical history of asthma presenting to the ED with progressively worsening shortness of breath, wheezing, and dry cough for the last 2 to 3 days. Does have a history of environmental allergies as well. Denies any fevers. Denies any chest pain. Denies any lightheadedness or dizziness. Prior to arrival, she did receive Solu-Medrol and magnesium per EMS    On my exam patient is already received breathing treatment and is talking in full sentences without difficulty. No accessory muscle use.   She is normocephalic atraumatic. Heart sounds are regular with no murmurs or gallops. Lungs with injected Tory wheezing, but no significant accessory muscle use. Was on 2 L when I entered the room, however taken off of the supplemental oxygen due to the fact that she was saturating 95 to 96%. Abdomen is soft nontender nondistended. No significant extremity edema. She is alert and oriented. No cyanosis or pallor.     MDM/Plan:   Acute asthma exacerbation  Patient initially on supplemental O2, but is improving after breathing treatment  Does have diffuse wheezing  Already received Solu-Medrol and magnesium  Basic labs symptomatic treatment and reevaluation of able to ambulate and maintain sats without supplemental O2 likely plan for discharge home      Critical Care: None     Nadia Guerra MD  Attending Emergency Physician         Nadia Guerra MD  12/14/21 9108

## 2022-01-14 ENCOUNTER — VIRTUAL VISIT (OUTPATIENT)
Dept: PULMONOLOGY | Age: 62
End: 2022-01-14
Payer: COMMERCIAL

## 2022-01-14 DIAGNOSIS — J45.41 MODERATE PERSISTENT ASTHMA WITH ACUTE EXACERBATION: ICD-10-CM

## 2022-01-14 DIAGNOSIS — J41.0 SIMPLE CHRONIC BRONCHITIS (HCC): Primary | ICD-10-CM

## 2022-01-14 PROCEDURE — 99214 OFFICE O/P EST MOD 30 MIN: CPT | Performed by: INTERNAL MEDICINE

## 2022-01-14 RX ORDER — LISINOPRIL 5 MG/1
TABLET ORAL
COMMUNITY
Start: 2021-12-16

## 2022-01-14 RX ORDER — MONTELUKAST SODIUM 10 MG/1
10 TABLET ORAL DAILY
Qty: 30 TABLET | Refills: 3 | Status: SHIPPED | OUTPATIENT
Start: 2022-01-14 | End: 2022-06-27

## 2022-01-14 RX ORDER — CARVEDILOL 3.12 MG/1
TABLET ORAL
COMMUNITY
Start: 2021-12-17

## 2022-01-14 RX ORDER — SPIRONOLACTONE 25 MG/1
TABLET ORAL
COMMUNITY
Start: 2021-12-16

## 2022-01-14 RX ORDER — ALBUTEROL SULFATE 90 UG/1
2 AEROSOL, METERED RESPIRATORY (INHALATION) 4 TIMES DAILY PRN
Qty: 54 G | Refills: 1 | Status: SHIPPED | OUTPATIENT
Start: 2022-01-14

## 2022-01-14 NOTE — PROGRESS NOTES
2022    TELEHEALTH EVALUATION -- Audio/Visual (During QNDEN-57 public health emergency)    Patient and physician are located in their individual locations. This is visit is completed via [employer] application []/ Doxy. me[] / Telephone []     HPI:    Bri Bellamy (:  1960) has requested an audio/video evaluation for the following concern(s):    Patient has asthmatic bronchitis. She has given up smoking. She has taken her flu vaccine and COVID-vaccine. No effort dyspnea no sputum production no chest pain. Her blood pressure is under good control no evidence of any heart failure PND angina. No pedal edema. Using Singulair and albuterol. Her weight has been stable. Review of Systems    Prior to Visit Medications    Medication Sig Taking?  Authorizing Provider   carvedilol (COREG) 3.125 MG tablet TAKE 1 TABLET BY MOUTH EVERY 12 HOURS WITH FOOD Yes Historical Provider, MD   lisinopril (PRINIVIL;ZESTRIL) 5 MG tablet  Yes Historical Provider, MD   spironolactone (ALDACTONE) 25 MG tablet  Yes Historical Provider, MD   ipratropium-albuterol (DUONEB) 0.5-2.5 (3) MG/3ML SOLN nebulizer solution Inhale 3 mLs into the lungs three times daily Yes Rayo Singh MD   sodium chloride (OCEAN) 0.65 % nasal spray 1 spray by Nasal route as needed for Congestion Yes Rayo Singh MD   famotidine (PEPCID) 20 MG tablet Take 1 tablet by mouth 2 times daily Yes Rayo Singh MD   guaiFENesin (ROBITUSSIN) 100 MG/5ML SOLN oral solution Take 200 mg by mouth every 4 hours as needed for Cough Yes Historical Provider, MD   fluticasone-salmeterol (ADVAIR) 500-50 MCG/DOSE diskus inhaler Inhale 1 puff into the lungs every 12 hours Yes Joni Fierro MD   albuterol sulfate HFA (VENTOLIN HFA) 108 (90 Base) MCG/ACT inhaler inhale 2 puffs by mouth every 6 hours if needed for wheezing Yes Rex Yarbrough MD   montelukast (SINGULAIR) 10 MG tablet Take 1 tablet by mouth nightly take 1 tablet by mouth every evening Yes Rashawn Dickey MD   fluticasone (FLONASE) 50 MCG/ACT nasal spray 1 spray by Each Nostril route daily Yes Historical Provider, MD   Multiple Vitamins-Minerals (THERAPEUTIC MULTIVITAMIN-MINERALS) tablet Take 1 tablet by mouth daily Yes Historical Provider, MD   Loratadine (CLARITIN) 10 MG CAPS Take  by mouth. Yes Historical Provider, MD   enalapril-hydroCHLOROthiazide (VASERETIC) 10-25 MG per tablet take 1 tablet by mouth once daily  Patient not taking: Reported on 2022  Rashawn Dickey MD   Nebulizers (COMPRESSOR/NEBULIZER) MISC 1 each by Does not apply route 4 times daily for 14 days Use with medications as directed  Rashawn Dickey MD       Social History     Tobacco Use    Smoking status: Former Smoker     Packs/day: 0.25     Years: 0.50     Pack years: 0.12     Quit date: 2012     Years since quittin.0    Smokeless tobacco: Never Used   Vaping Use    Vaping Use: Never used   Substance Use Topics    Alcohol use: No    Drug use: No            RECORD REVIEW: Previous medical records were reviewed at today's visit.     Wt Readings from Last 3 Encounters:   21 140 lb (63.5 kg)   21 140 lb 3.2 oz (63.6 kg)   21 140 lb (63.5 kg)       Results for orders placed or performed in visit on 22   CBC with Differential   Result Value Ref Range    WBC 7.72 4.00 - 10.60 10*3/uL    RBC 4.64 3.80 - 5.00 10*6/uL    Hemoglobin 14.1 12.0 - 15.0 g/dL    Hematocrit 40.9 36.0 - 45.0 %    MCV 88.1 82.0 - 98.0 fL    MCH 30.4 27.0 - 33.0 pg    MCHC 34.5 32.0 - 35.0 g/dL    RDW 12.5 11.5 - 15.0 %    Platelets 131 (H) 662 - 400 10*3/uL    Neutrophils % 50.9 40.0 - 72.0 %    Immature Granulocytes 0.1 0.0 - 1.0 %    Lymphocytes % 27.7 20.0 - 45.0 %    Monocytes % 8.5 5.0 - 12.0 %    Eosinophils % 11.5 (H) 0.0 - 6.0 %    Basophils % 1.3 (H) 0.0 - 1.0 %    Neutrophils Absolute 3.9 1.6 - 7.6 10*3/uL    Absolute Immature Granulocyte 0.0 0.0 - 0.2 10*3/uL    Lymphocytes Absolute 2.1 1.2 - 4.0 10*3/uL    Monocytes Absolute 0.7 0.1 - 1.0 10*3/uL    Eosinophils Absolute 0.9 (H) 0.0 - 0.5 10*3/uL    Basophils Absolute 0.1 0.0 - 0.2 10*3/uL    nRBC 0 0 - 0 %   PROTHROMBIN TIME (PT)   Result Value Ref Range    PT 12.7 12.3 - 14.8 sec    INR 0.96 0.91 - 1.16   APTT   Result Value Ref Range    aPTT 31.0 25.0 - 35.0 sec   D-Dimer, Quantitative   Result Value Ref Range    D-Dimer, Quant 0.50 (H) 0.27 - 0.49 mcg/mL FEU   Troponin   Result Value Ref Range    Troponin I 0.00 0.00 - 0.04 ng/mL   Basic Metabolic Panel   Result Value Ref Range    Glucose 97 70 - 100 mg/dL    BUN 15 7 - 25 mg/dL    CREATININE 0.65 0.60 - 1.20 mg/dL    Sodium 136 136 - 145 meq/L    Potassium 4.9 3.5 - 5.1 meq/L    Chloride 101 98 - 107 meq/L    CO2 24 21 - 31 meq/L    Calcium 9.4 8.6 - 10.3 mg/dL    EGFR IF NonAfrican American >60 >60 ml/min/1.73sq m    eGFR African American >60 >60 ml/min/1.73sq m   Brain Natriuretic Peptide   Result Value Ref Range    BNP 16 0 - 100 pg/mL   POCT COVID-19, Antigen   Result Value Ref Range    SARS-COV-2, POC NEGATIVE NEGATIVE       CTA CHEST W WO CONTRAST    Result Date: 2022  Pulmonary Embolism [facility] Department of Radiology [address], [place] [phone]    ========================================================================== Patient Name: Jyoti Beltre : 1960 Sex: F Age: Irving Romo MRN: [id number] Pt. Location: [facility] Patient Status: E Visit #: [id number] Ordered Date: 2022 8:45:00 AM Completed Date: 2022 12:04 PM Requesting Provider: Olga Jolley Attending Provider: Olga Jolley Report Copy To:  Signs ^ Symptoms: Shortness of Breath History: See Comments Comments: Pulmonary Embolism Exam: CTA CHEST Accession #: [id number] ========================================================================== CTA CHEST  2022 12:04 PM  CLINICAL INDICATIONS: Shortness of Breath  TECHNOLOGIST COMMENTS: pt has asthma sob today cough  QUESTION FOR THE RADIOLOGIST: Pulmonary Embolism  PROTOCOL: Axial CT angiography images were obtained with IV contrast.  CONTRAST: Contrast: OMNIPAQUE 350  (LOCM), 100 milliliter, Intravenous  TECHNIQUE: Multidetector CT angiography axial slices of the chest  were obtained with IV contrast. Multiplanar reformats, MIP, and volume rendered 3-D images were generated on a separate workstation and reviewed to further define anatomy and possible pathology. COMPARISON: None. FINDINGS: The thyroid and thoracic inlet are unremarkable. The central airways are patent. No thoracic aortic aneurysm or dissection. No evidence of pulmonary embolus. No mediastinal adenopathy. Minimal coronary artery calcification, no cardiomegaly or pericardial effusion. No pleural effusion or pneumothorax. Focal groundglass opacity along the inferior aspect of the right upper lobe adjacent to the mediastinum measuring a maximum 1.8 cm. The chest wall is unremarkable. The study continued into the upper abdomen is unremarkable. The study viewed at bone window shows no acute or aggressive lesions. IMPRESSION:  No evidence of pulmonary embolus. Groundglass opacity present in right upper lobe. 6-12 month CT follow-up recommended per Fleischner Society criteria. All CT scans at this facility use dose modulation, iterative reconstruction, and/or weight based dosing when appropriate to reduce radiation dose to as low as reasonably achievable  Electronically signed: Criss Woodward. Transcribed by: Francisco Dodd Resident: Electronically Signed by: Jackie Cat @ 2022 12:22 PM    XR CHEST PORTABLE    Result Date: 2022  [facility] Department of Radiology [address], [place] [phone]    ========================================================================== Patient Name: Emerson Javier : 1960 Sex: F Age: Catherine Eli MRN: [id number] Pt. Location: [facility] Patient Status: E Visit #: [id number] Ordered Date: 1/4/2022 7:05:00 AM Completed Date: 01/04/2022 07:54 AM Requesting Provider: Irene Avila Attending Provider: Irene Avila Report Copy To: Signs ^ Symptoms: Shortness of Breath History: Comments: Infiltrates Exam: PORTABLE CHEST 1 VIEW Accession #: [id number] ========================================================================== PORTABLE CHEST 1 VIEW  1/4/2022 7:54 AM  CLINICAL INDICATIONS: Shortness of Breath  TECHNOLOGIST COMMENTS: Shortness of breath. History of asthma. QUESTION FOR THE RADIOLOGIST: Infiltrates  PROTOCOL: AP(PA) view was obtained. COMPARISON: August 20, 2021. FINDINGS: The lungs are clear of infiltrate, effusion and pneumothorax. The heart, mediastinum and pulmonary vascularity are unremarkable. The trachea is in midline and the osseous structures are intact. IMPRESSION:  Unremarkable chest.  Electronically signed: Gemini Parra. Transcribed by: Tatyana, User Resident: Electronically Signed by: Ananth Felder @ 01/04/2022 08:00 AM      PHYSICAL EXAMINATION:  Due to this being a TeleHealth encounter, evaluation of the following organ systems is limited: Vitals/Constitutional/EENT/Resp/CV/GI//MS/Neuro/Skin/Heme-Lymph-Imm. Constitutional: [x] Appears well-developed and well-nourished. [] Abnormal  Mental status  [x] Alert and awake  [x] Oriented to person/place/time [x]Able to follow commands    [x] No apparent distress      Eyes:  EOM    [x]  Normal  [] Abnormal-  Sclera  [x]  Normal  [] Abnormal -         Discharge [x]  None visible  [] Abnormal -    HENT:   [x] Normocephalic, atraumatic. [] Abnormal shaped head   [x] Mouth/Throat: Mucous membranes are moist.     Ears [x] Normal  [] Abnormal-    Neck: [x] Normal range of motion [x] Supple [x] No visualized mass.      Pulmonary/Chest: [x] Respiratory effort normal.  [x] No visualized signs of difficulty breathing or respiratory distress        [] Abnormal Musculoskeletal:   [x] Normal range of motion. [x] Normal gait with no signs of ataxia. [x]  No signs of cyanosis of the peripheral portions of extremities. [] Abnormal       Neurological:        [x] Normal cranial nerve (limited exam to video visit) [x] No focal weakness observed       [] Abnormal          Speech       [x] Normal   [] Abnormal     Skin:        [x] No rash on visible skin  [x] Normal  [] Abnormal     Psychiatric:       [x] Normal  [] Abnormal        [x] Normal Mood  [] Anxious appearing        Other Pertinent Exam Findings:       ASSESSMENT:    Ex-smoker  Hypertension   asthmatic bronchitis    Plan:   1. I encouraged the patient to stay away from smoking. 2.   3. She will continue to use albuterol on as-needed basis. She will continue the use of Singulair as before  4.   5. Advised her to avoid exposure to the cold weather. 6.   7. Advised her to continue to wear the mask. 8.   9. She already had COVID-vaccine and flu vaccines. 10.   11. She will continue her antihypertensive therapy blood pressure is under good control. 12.   13. I have advised her to continue to participate in an exercise program to avoid any weight gain of especially after having given up smoking. 14.   15. Apparently a follow-up in about 6 months  16. Patient was given the prescriptions Singulair and albuterol. 16.   18.   19. Dictated by Dr. Thurmond Apgar MD dictation over thank you  20. An  electronic signature was used to authenticate this note. --Florin Morse MD on 2022 at 11:59 AM    9}    Pursuant to the emergency declaration under the [facility], [facility] waiver authority and the [employer] Act, this Virtual  Visit was conducted, with patient's consent, to reduce the patient's risk of exposure to COVID-19 and provide continuity of care for an established patient.     Services were provided through a video synchronous discussion virtually to substitute for in-person clinic visit.     _______________________________________________________________________________________________________________________________________________  FOR TELEPHONE VISITS PLEASE COMPLETE THE FOLLOWING      Consent:  She and/or health care decision maker is aware that that she may receive a bill for this telephone service, depending on her insurance coverage, and has provided verbal consent to proceed: Yes      I affirm this is a Patient Initiated Episode with an Established Patient who has not had a related appointment within my department in the past 7 days or scheduled within the next 24 hours.     Total Time: minutes: 21-30 minutes    Note: not billable if this call serves to triage the patient into an appointment for the relevant concern

## 2022-01-24 ENCOUNTER — TELEPHONE (OUTPATIENT)
Dept: PULMONOLOGY | Age: 62
End: 2022-01-24

## 2022-01-24 NOTE — TELEPHONE ENCOUNTER
Patient called states she is wheezing and is asking for a Prednisone burst. I phoned in the script to her pharmacy, please sign off on it.

## 2022-01-25 RX ORDER — PREDNISONE 10 MG/1
10 TABLET ORAL SEE ADMIN INSTRUCTIONS
Qty: 30 TABLET | Refills: 0 | Status: SHIPPED | OUTPATIENT
Start: 2022-01-25 | End: 2022-03-04 | Stop reason: SDUPTHER

## 2022-01-31 RX ORDER — ALBUTEROL SULFATE 90 UG/1
2 AEROSOL, METERED RESPIRATORY (INHALATION) EVERY 6 HOURS PRN
Qty: 18 G | Refills: 3 | OUTPATIENT
Start: 2022-01-31 | End: 2022-03-20 | Stop reason: SDUPTHER

## 2022-01-31 RX ORDER — ALBUTEROL SULFATE 90 UG/1
2 AEROSOL, METERED RESPIRATORY (INHALATION) EVERY 6 HOURS PRN
Qty: 18 G | Refills: 3 | Status: CANCELLED | OUTPATIENT
Start: 2022-01-31

## 2022-01-31 NOTE — TELEPHONE ENCOUNTER
Needs Brand name. Please sign pending order. Called pharmacy [phone] and spoke to Lucrecia Guzman.

## 2022-03-04 ENCOUNTER — TELEPHONE (OUTPATIENT)
Dept: PULMONOLOGY | Age: 62
End: 2022-03-04

## 2022-03-04 DIAGNOSIS — J45.41 MODERATE PERSISTENT ASTHMA WITH ACUTE EXACERBATION: Primary | ICD-10-CM

## 2022-03-04 NOTE — TELEPHONE ENCOUNTER
Pt is having asthma flair up for 1.5 weeks. Short of breath, breathless, wheezing, coughing up clear phlegm. She stated her sinus are giving her trouble. O2 sat ranging 91%-98%.

## 2022-03-10 RX ORDER — PREDNISONE 10 MG/1
10 TABLET ORAL SEE ADMIN INSTRUCTIONS
Qty: 30 TABLET | Refills: 0 | OUTPATIENT
Start: 2022-03-10 | End: 2022-04-22 | Stop reason: SDUPTHER

## 2022-03-10 RX ORDER — AZITHROMYCIN 500 MG/1
500 TABLET, FILM COATED ORAL DAILY
Qty: 3 TABLET | Refills: 0 | OUTPATIENT
Start: 2022-03-10 | End: 2022-03-13

## 2022-03-22 RX ORDER — ALBUTEROL SULFATE 90 UG/1
2 AEROSOL, METERED RESPIRATORY (INHALATION) EVERY 6 HOURS PRN
Qty: 18 G | Refills: 3 | Status: SHIPPED | OUTPATIENT
Start: 2022-03-22 | End: 2022-06-24

## 2022-03-22 NOTE — TELEPHONE ENCOUNTER
Per Dr. Belkys Herndon to refill this for her. I called [facility] and gave a verbal. Pt has been informed.

## 2022-03-22 NOTE — TELEPHONE ENCOUNTER
Pt called to request refills of her Advair. She is current. Order is pending to you for review. Please sign if you agree. Thank you.

## 2022-04-22 RX ORDER — PREDNISONE 10 MG/1
10 TABLET ORAL SEE ADMIN INSTRUCTIONS
Qty: 30 TABLET | Refills: 0 | Status: SHIPPED | OUTPATIENT
Start: 2022-04-22 | End: 2022-05-04

## 2022-04-22 NOTE — TELEPHONE ENCOUNTER
Patient called and states her asthma and allergies are flaring she wants a prednisone burst. I did pending order.

## 2022-05-12 RX ORDER — ALBUTEROL SULFATE 2.5 MG/3ML
2.5 SOLUTION RESPIRATORY (INHALATION) EVERY 6 HOURS PRN
Qty: 120 EACH | Refills: 7 | Status: SHIPPED | OUTPATIENT
Start: 2022-05-12

## 2022-05-17 ENCOUNTER — OFFICE VISIT (OUTPATIENT)
Dept: PULMONOLOGY | Age: 62
End: 2022-05-17
Payer: COMMERCIAL

## 2022-05-17 VITALS
OXYGEN SATURATION: 96 % | RESPIRATION RATE: 18 BRPM | WEIGHT: 148 LBS | SYSTOLIC BLOOD PRESSURE: 168 MMHG | BODY MASS INDEX: 25.27 KG/M2 | DIASTOLIC BLOOD PRESSURE: 91 MMHG | HEIGHT: 64 IN | HEART RATE: 94 BPM

## 2022-05-17 DIAGNOSIS — E66.9 OBESITY (BMI 35.0-39.9 WITHOUT COMORBIDITY): ICD-10-CM

## 2022-05-17 DIAGNOSIS — J45.40 MODERATE PERSISTENT ASTHMA WITHOUT COMPLICATION: Primary | ICD-10-CM

## 2022-05-17 DIAGNOSIS — I10 ESSENTIAL HYPERTENSION: ICD-10-CM

## 2022-05-17 PROCEDURE — 99214 OFFICE O/P EST MOD 30 MIN: CPT | Performed by: INTERNAL MEDICINE

## 2022-05-17 RX ORDER — PREDNISONE 20 MG/1
40 TABLET ORAL DAILY
Qty: 20 TABLET | Refills: 0 | Status: SHIPPED | OUTPATIENT
Start: 2022-05-17 | End: 2022-05-27

## 2022-05-17 NOTE — PROGRESS NOTES
Maryana Morales  5/17/2022    Chief Complaint   Patient presents with    Asthma     6 month f/u   asthmatic bronchitis  systemic hypertension    Maryana Morales is known to have asthmatic bronchitis. She has been a chronic smoker. Seems that she is still smoking although she is trying to stop it. She is taking Spiriva. she seems to be wheezing. There is no evidence of an acute exacerbation of COPD except for wheezing which is audible. No yellow sputum no hemoptysis no chest pain. He has not noted any pedal edema no thromboembolic process. Her blood pressure is under good control no angina. Continue use of Singulair as before. She also use her DuoNeb on as-needed basis. He also regularly using long-acting stimulants and steroid combination. Chest pain no angina absent    Patient is under a lot of stress because of the recent loss of her mother and has her  has been diagnosed to have carcinoma. Review of Systems -  General ROS: negative for - chills, fatigue, fever or weight loss  ENT ROS: negative for - headaches, oral lesions or sore throat  Cardiovascular ROS: no chest pain , orthopnea or pnd   Gastrointestinal ROS: no abdominal pain, change in bowel habits, or black or bloody stools  Skin - no rash   Neuro - no blurry vision , no loc . No focal weakness   msk - no jt tenderness or swelling    Vascular - no claudication , rest completed and negative   Lymphatic - complete and negative   Hematology - oncology - complete and negative   Allergy immunology - complete and negative    no burning or hematuria       LUNG CANCER SCREENING     1. CRITERIA MET    []     CT ORDERED  []      2. CRITERIA NOT MET   [x]      3. REFUSED                    []        REASON CRITERIA NOT MET     1. SMOKING LESS THAN 30 PY  []      2. AGE LESS THAN 55 or GREATER 77 YEARS  []      3. QUIT SMOKING 15 YEARS OR GREATER   []      4. RECENT CT WITH IN 11 MONTHS    []      5.  LIFE EXPECTANCY < 5 YEARS   [] 6. SIGNS  AND SYMPTOMS OF LUNG CANCER   []           Immunization   Immunization History   Administered Date(s) Administered    COVID-19, Pfizer Purple top, DILUTE for use, 12+ yrs, 30mcg/0.3mL dose 05/07/2021, 05/28/2021, 12/27/2021    Influenza Vaccine, unspecified formulation 10/28/2017, 10/06/2018    Influenza Virus Vaccine 12/22/2012, 01/04/2014, 01/16/2014, 09/13/2014, 08/15/2015, 10/30/2016    Influenza, MDCK Quadv, IM, PF (Flucelvax 2 yrs and older) 11/26/2019    Influenza, Rebecca Hamburger, IM, (6 mo and older Fluzone, Flulaval, Fluarix and 3 yrs and older Afluria) 10/30/2016    Pneumococcal Polysaccharide ([id number]) 01/16/2014    Tdap (Boostrix, Adacel) 11/24/2015        Pneumococcal Vaccine     [x] Up to date    [] Indicated   [] Refused  [] Contraindicated       Influenza Vaccine   [x] Up to date    [] Indicated   [] Refused  [] Contraindicated   had taken Covid vaccine PAST MEDICAL HISTORY:         Diagnosis Date    Acute asthma exacerbation 3/29/2021    Acute diverticulitis 2/8/2021    Acute respiratory failure with hypoxia ([facility] [address]     Asthma 4/26/2013    COVID-19 virus antibody negative 08/11/2020    negative    Essential hypertension     Former smoker     Gastroesophageal reflux disease     Hypertension 9/3/2014    Moderate persistent asthma without complication     Pneumonia of right lower lobe due to infectious organism 2/8/2021    Post-nasal drip     Takotsubo cardiomyopathy        Family History:   History reviewed. No pertinent family history. SURGICAL HISTORY:   History reviewed. No pertinent surgical history. Not in a hospital admission.   Allergies   Allergen Reactions    Clindamycin/Lincomycin     Codeine     Dye [Iodides]     Iodine     Levofloxacin     Levofloxacin      Shortness of breath    Other Hives    Pcn [Penicillins]      Shortness of breath    Perflutren Lipid Microsphere Hives    Pollen Extract Itching    Proair Respiclick [Albuterol Sulfate] Per patient \"allergic to the propellant\"    Sulfa Antibiotics      Redness , shortness of breath    Sulfites      Social History     Tobacco Use   Smoking Status Former Smoker    Packs/day: 0.25    Years: 0.50    Pack years: 0.12    Quit date: 2012    Years since quittin.4   Smokeless Tobacco Never Used     Prior to Admission medications    Medication Sig Start Date End Date Taking?  Authorizing Provider   Tiotropium Bromide Monohydrate ([place]) Inhale into the lungs   Yes Historical Provider, MD   albuterol (PROVENTIL) (2.5 MG/3ML) 0.083% nebulizer solution Take 3 mLs by nebulization every 6 hours as needed for Wheezing or Shortness of Breath 22   Kenzie Ramos MD   fluticasone-salmeterol (ADVAIR) 500-50 MCG/DOSE diskus inhaler Inhale 1 puff into the lungs every 12 hours 3/24/22   Kenzie Ramos MD   albuterol sulfate HFA (VENTOLIN HFA) 108 (90 Base) MCG/ACT inhaler Inhale 2 puffs into the lungs every 6 hours as needed for Wheezing 3/22/22   Kenzie Ramos MD   carvedilol (COREG) 3.125 MG tablet TAKE 1 TABLET BY MOUTH EVERY 12 HOURS WITH FOOD 21   Historical Provider, MD   lisinopril (PRINIVIL;ZESTRIL) 5 MG tablet  21   Historical Provider, MD   spironolactone (ALDACTONE) 25 MG tablet  21   Historical Provider, MD   montelukast (SINGULAIR) 10 MG tablet Take 1 tablet by mouth daily 22   Kenzie Ramos MD   albuterol sulfate HFA (VENTOLIN HFA) 108 (90 Base) MCG/ACT inhaler Inhale 2 puffs into the lungs 4 times daily as needed for Wheezing 22   Kenzie Ramos MD   albuterol (PROVENTIL) (5 MG/ML) 0.5% nebulizer solution Take 1 mL by nebulization 4 times daily as needed for Wheezing 22   Kenzie Ramos MD   ipratropium-albuterol (DUONEB) 0.5-2.5 (3) MG/3ML SOLN nebulizer solution Inhale 3 mLs into the lungs three times daily 3/31/21   Teto Lou MD   sodium chloride (OCEAN) 0.65 % nasal spray 1 spray by Nasal route as needed for Congestion 3/31/21   Daljit Rodrigues MD   famotidine (PEPCID) 20 MG tablet Take 1 tablet by mouth 2 times daily 3/31/21   Daljit Rodrigues MD   guaiFENesin (ROBITUSSIN) 100 MG/5ML SOLN oral solution Take 200 mg by mouth every 4 hours as needed for Cough    Historical Provider, MD   albuterol sulfate HFA (VENTOLIN HFA) 108 (90 Base) MCG/ACT inhaler inhale 2 puffs by mouth every 6 hours if needed for wheezing 1/4/21   Libertad Mo MD   enalapril-hydroCHLOROthiazide (VASERETIC) 10-25 MG per tablet take 1 tablet by mouth once daily  Patient not taking: Reported on 1/14/2022 12/3/20   Alex Bhat MD   montelukast (SINGULAIR) 10 MG tablet Take 1 tablet by mouth nightly take 1 tablet by mouth every evening 11/2/20   Alex Bhat MD   fluticasone (FLONASE) 50 MCG/ACT nasal spray 1 spray by Each Nostril route daily    Historical Provider, MD   Multiple Vitamins-Minerals (THERAPEUTIC MULTIVITAMIN-MINERALS) tablet Take 1 tablet by mouth daily    Historical Provider, MD   Nebulizers (COMPRESSOR/NEBULIZER) MISC 1 each by Does not apply route 4 times daily for 14 days Use with medications as directed 2/26/19 3/12/19  Alex Bhat MD   Loratadine (CLARITIN) 10 MG CAPS Take  by mouth. Historical Provider, MD         Physical Exam  General Appearance:    Alert, cooperative, no distress, appears stated age no respiratory distress not using accessory muscles no fever   Head:    Normocephalic, without obvious abnormality, atraumatic   eye examination revealed no jaundice no [place] syndrome    nose revealed no polyps no sinus tenderness    throat examination is unremarkable.     Gait examination is unremarkable   :    Neck:   Supple, symmetrical, trachea midline, no adenopathy;     thyroid:  no enlargement/tenderness/nodules; no carotid    bruit or JVD   Back:     Symmetric, no curvature, ROM normal, no CVA tenderness   Lungs:    AP diameter is increased percussion note is hyperresonant expiration prolonged no rales but rhonchi are audible on both sides   Chest Wall:    No tenderness or deformity      Heart:    Regular rate and rhythm, S1 and S2 normal, no murmur, rub        or gallop no rvh                           Abdomen:                                                 Pulses:                                            Lymph nodes:                    Neurologic:                  Soft, non-tender, bowel sounds active all four quadrants,     no masses, no organomegaly         2+ and symmetric all extremities            Cervical, supraclavicular not enlarged or matted or tender      CNII-XII intact, normal strength 5/5 . Sensation grossly normal  and reflexes normal 2+  throughout     Clubbing No  Lower ext edema No1+   [] , 2 +  [] , 3+   []  Upper ext edema No       Musculoskeletal - no joint swelling or tenderness or synovitis               BP (!) 168/91 (Site: Left Upper Arm, Position: Sitting, Cuff Size: Medium Adult)   Pulse 94   Resp 18   Ht 5' 4\" (1.626 m)   Wt 148 lb (67.1 kg)   SpO2 96%   BMI 25.40 kg/m²     CXR  no recent chest x-ray      CT Scans  since CT    Echo  no recent echo        Assessment  Systemic hypertension  asthmatic bronchitis  Acute exacerbation  Plan  I started her on Spiriva. She is on beta-blocker. So Spiriva should help her. She will continue Advair as before she was advised to rinse her mouth and throat after use of Advair    She will continue treatment of hypertension as before. I gave her a short course of prednisone 40 mg daily for the next 5 days. That should help to relieve the wheezing and acute respiratory distress and be which she is experiencing now. She already have had COVID-vaccine. Electronically signed by Rajan Cuevas MD on   Dictated by Dr. Hair Bergman MD  Please note that this chart was generated using voice recognition Dragon dictation software.   Although every effort was made to ensure the accuracy of this automated transcription, some errors in transcription may have occurred.

## 2022-05-31 ENCOUNTER — PATIENT MESSAGE (OUTPATIENT)
Dept: PULMONOLOGY | Age: 62
End: 2022-05-31

## 2022-06-02 RX ORDER — TIOTROPIUM BROMIDE 18 UG/1
18 CAPSULE ORAL; RESPIRATORY (INHALATION) DAILY
Qty: 30 CAPSULE | Refills: 3 | Status: SHIPPED | OUTPATIENT
Start: 2022-06-02 | End: 2022-10-07

## 2022-06-24 NOTE — TELEPHONE ENCOUNTER
LAST VISIT: 5/17/22  NEXT VISIT: 9/15/22    No mention of this medication in your last dictation but you have prescribed in the past. Please sign for refill if ok. Thank you.

## 2022-06-27 RX ORDER — MONTELUKAST SODIUM 10 MG/1
TABLET ORAL
Qty: 30 TABLET | Refills: 10 | Status: SHIPPED | OUTPATIENT
Start: 2022-06-27

## 2022-06-27 NOTE — TELEPHONE ENCOUNTER
LAST VISIT: 5/17/22  NEXT VISIT: 9/15/22    Per last dictation patient is on this medication. Please sign for refill if ok. Thank you.

## 2022-06-30 RX ORDER — PREDNISONE 20 MG/1
TABLET ORAL
Qty: 20 TABLET | Refills: 0 | OUTPATIENT
Start: 2022-06-30

## 2022-06-30 NOTE — TELEPHONE ENCOUNTER
No mention in last dictation that this is to be kept on hand. Refused medication with note to pharmacy that patient needs to call the office. Please make any changes needed. Thank you.

## 2022-07-19 RX ORDER — FLUTICASONE PROPIONATE AND SALMETEROL 50; 500 UG/1; UG/1
POWDER RESPIRATORY (INHALATION)
Qty: 1 INHALER | Refills: 9 | Status: SHIPPED | OUTPATIENT
Start: 2022-07-19

## 2022-10-06 NOTE — TELEPHONE ENCOUNTER
LAST VISIT: 5/17/22  NEXT VISIT: 12/6/22    Per last dictation patient is on this medication. Please sign for refill if ok. Thank you.

## 2022-10-07 RX ORDER — TIOTROPIUM BROMIDE 18 UG/1
CAPSULE ORAL; RESPIRATORY (INHALATION)
Qty: 30 CAPSULE | Refills: 7 | Status: SHIPPED | OUTPATIENT
Start: 2022-10-07

## 2022-12-06 ENCOUNTER — OFFICE VISIT (OUTPATIENT)
Dept: PULMONOLOGY | Age: 62
End: 2022-12-06
Payer: COMMERCIAL

## 2022-12-06 VITALS
RESPIRATION RATE: 18 BRPM | SYSTOLIC BLOOD PRESSURE: 145 MMHG | HEIGHT: 64 IN | WEIGHT: 156 LBS | HEART RATE: 101 BPM | BODY MASS INDEX: 26.63 KG/M2 | OXYGEN SATURATION: 95 % | DIASTOLIC BLOOD PRESSURE: 92 MMHG

## 2022-12-06 DIAGNOSIS — I10 HYPERTENSION, UNSPECIFIED TYPE: ICD-10-CM

## 2022-12-06 DIAGNOSIS — J45.20 MILD INTERMITTENT ASTHMA WITHOUT COMPLICATION: Primary | ICD-10-CM

## 2022-12-06 DIAGNOSIS — J45.40 MODERATE PERSISTENT ASTHMA WITHOUT COMPLICATION: ICD-10-CM

## 2022-12-06 PROCEDURE — 99214 OFFICE O/P EST MOD 30 MIN: CPT | Performed by: INTERNAL MEDICINE

## 2022-12-06 PROCEDURE — 3078F DIAST BP <80 MM HG: CPT | Performed by: INTERNAL MEDICINE

## 2022-12-06 PROCEDURE — 3074F SYST BP LT 130 MM HG: CPT | Performed by: INTERNAL MEDICINE

## 2022-12-06 ASSESSMENT — SLEEP AND FATIGUE QUESTIONNAIRES
HOW LIKELY ARE YOU TO NOD OFF OR FALL ASLEEP WHILE SITTING AND TALKING TO SOMEONE: 0
HOW LIKELY ARE YOU TO NOD OFF OR FALL ASLEEP WHILE SITTING AND READING: 0
HOW LIKELY ARE YOU TO NOD OFF OR FALL ASLEEP IN A CAR, WHILE STOPPED FOR A FEW MINUTES IN TRAFFIC: 0
ESS TOTAL SCORE: 0
HOW LIKELY ARE YOU TO NOD OFF OR FALL ASLEEP WHILE SITTING INACTIVE IN A PUBLIC PLACE: 0
HOW LIKELY ARE YOU TO NOD OFF OR FALL ASLEEP WHILE WATCHING TV: 0
HOW LIKELY ARE YOU TO NOD OFF OR FALL ASLEEP WHILE LYING DOWN TO REST IN THE AFTERNOON WHEN CIRCUMSTANCES PERMIT: 0
HOW LIKELY ARE YOU TO NOD OFF OR FALL ASLEEP WHEN YOU ARE A PASSENGER IN A CAR FOR AN HOUR WITHOUT A BREAK: 0
HOW LIKELY ARE YOU TO NOD OFF OR FALL ASLEEP WHILE SITTING QUIETLY AFTER LUNCH WITHOUT ALCOHOL: 0

## 2022-12-06 NOTE — PROGRESS NOTES
Jacque Haysclair  12/6/2022    Chief Complaint   Patient presents with    Asthma     4 month follow up   asthmatic bronchitis  systemic hypertension    Jacque Zaidi i have bronchial asthma no respiratory distress, normal breath sounds, no rales, no wheezing. .  She is being treated with inhaled steroid long-acting beta agonist.  He uses albuterol on as-needed basis. Has  Mostly under good control. He does not wake up at night because of the asthma. Patient is under a lot of stress because of the recent loss of her mother and has her  has been diagnosed to have carcinoma. Patient recently had influenza A that was treated with steroids and antibiotics. He did not get Tamiflu. He however has recovered from the influenza denies any excessive wheezing or dyspnea. No excessive cough or sputum production. Nose evidence of any sinusitis. Her blood pressure is under good control. No evidence of PND or chest pain. Edema  Review of Systems -  General ROS: negative for - chills, fatigue, fever or weight loss  ENT ROS: negative for - headaches, oral lesions or sore throat  Cardiovascular ROS: no chest pain , orthopnea or pnd   Gastrointestinal ROS: no abdominal pain, change in bowel habits, or black or bloody stools  Skin - no rash   Neuro - no blurry vision , no loc .  No focal weakness   msk - no jt tenderness or swelling    Vascular - no claudication , rest completed and negative   Lymphatic - complete and negative   Hematology - oncology - complete and negative   Allergy immunology - complete and negative    no burning or hematuria       LUNG CANCER SCREENING     CRITERIA MET    []     CT ORDERED  []      CRITERIA NOT MET   [x]      REFUSED                    []        REASON CRITERIA NOT MET     SMOKING LESS THAN 30 PY  []      AGE LESS THAN 55 or GREATER 77 YEARS  []      QUIT SMOKING 15 YEARS OR GREATER   []      RECENT CT WITH IN 11 MONTHS    []      LIFE EXPECTANCY < 5 YEARS   []      SIGNS AND SYMPTOMS OF LUNG CANCER   []           Immunization   Immunization History   Administered Date(s) Administered    COVID-19, PFIZER Bivalent BOOSTER, (age 12y+), IM, 30 mcg/0.3 mL dose 10/17/2022    COVID-19, PFIZER PURPLE top, DILUTE for use, (age 15 y+), 30mcg/0.3mL 05/07/2021, 05/28/2021, 12/27/2021    Influenza Vaccine, unspecified formulation 10/28/2017, 10/06/2018    Influenza Virus Vaccine 12/22/2012, 01/04/2014, 01/16/2014, 09/13/2014, 08/15/2015, 10/30/2016    Influenza, AFLURIA (age 1 yrs+), FLUZONE, (age 10 mo+), MDV, 0.5mL 10/30/2016    Influenza, FLUARIX, FLULAVAL, FLUZONE (age 10 mo+) AND AFLURIA, (age 1 y+), PF, 0.5mL 10/28/2017, 10/08/2018, 09/13/2020, 10/17/2022    Influenza, FLUCELVAX, (age 10 mo+), MDCK, PF, 0.5mL 11/26/2019    Pneumococcal Polysaccharide ([id number]) 01/16/2014    Tdap (Boostrix, Adacel) 11/24/2015        Pneumococcal Vaccine     [x] Up to date    [] Indicated   [] Refused  [] Contraindicated       Influenza Vaccine   [x] Up to date    [] Indicated   [] Refused  [] Contraindicated   had taken Covid vaccine PAST MEDICAL HISTORY:         Diagnosis Date    Acute asthma exacerbation 3/29/2021    Acute diverticulitis 2/8/2021    Acute respiratory failure with hypoxia ([facility] [address]     Asthma 4/26/2013    COVID-19 virus antibody negative 08/11/2020    negative    Essential hypertension     Former smoker     Gastroesophageal reflux disease     Hypertension 9/3/2014    Moderate persistent asthma without complication     Pneumonia of right lower lobe due to infectious organism 2/8/2021    Post-nasal drip     Takotsubo cardiomyopathy        Family History:   No family history on file. SURGICAL HISTORY:   No past surgical history on file. Not in a hospital admission.   Allergies   Allergen Reactions    Clindamycin/Lincomycin     Codeine     Dye [Iodides]     Iodine     Levofloxacin     Levofloxacin      Shortness of breath    Other Hives    Pcn [Penicillins]      Shortness of breath Perflutren Lipid Microsphere Hives    Pollen Extract Itching    Proair Respiclick [Albuterol Sulfate]      Per patient \"allergic to the propellant\"    Sulfa Antibiotics      Redness , shortness of breath    Sulfites      Social History     Tobacco Use   Smoking Status Former    Packs/day: 0.25    Years: 0.50    Pack years: 0.13    Types: Cigarettes    Quit date: 2012    Years since quittin.9   Smokeless Tobacco Never     Prior to Admission medications    Medication Sig Start Date End Date Taking?  Authorizing Provider   Edward Thao 18 MCG inhalation capsule inhale contents of 1 capsule by mouth once daily 10/7/22  Yes Lisa Hector MD   ADVAIR DISKUS 500-50 MCG/ACT AEPB diskus inhaler inhale 1 puff by mouth and INTO THE LUNGS twice a day Rinse mouth after use 22  Yes Lisa Hector MD   VENTOLIN  (90 Base) MCG/ACT inhaler inhale 2 puffs by mouth and INTO THE LUNGS every 6 hours if needed for wheezing 22  Yes Lisa Hector MD   montelukast (SINGULAIR) 10 MG tablet take 1 tablet by mouth once daily 22  Yes Lisa Hector MD   Tiotropium Bromide Monohydrate (Edward Thao IN) Inhale into the lungs   Yes Historical Provider, MD   albuterol (PROVENTIL) (2.5 MG/3ML) 0.083% nebulizer solution Take 3 mLs by nebulization every 6 hours as needed for Wheezing or Shortness of Breath 22  Yes Lisa Hector MD   carvedilol (COREG) 3.125 MG tablet TAKE 1 TABLET BY MOUTH EVERY 12 HOURS WITH FOOD 21  Yes Historical Provider, MD   lisinopril (PRINIVIL;ZESTRIL) 5 MG tablet  21  Yes Historical Provider, MD   spironolactone (ALDACTONE) 25 MG tablet  21  Yes Historical Provider, MD   albuterol sulfate HFA (VENTOLIN HFA) 108 (90 Base) MCG/ACT inhaler Inhale 2 puffs into the lungs 4 times daily as needed for Wheezing 22  Yes Lisa Hector MD   albuterol (PROVENTIL) (5 MG/ML) 0.5% nebulizer solution Take 1 mL by nebulization 4 times daily as needed for Wheezing 1/14/22  Yes Manuelito Beatty MD   ipratropium-albuterol (DUONEB) 0.5-2.5 (3) MG/3ML SOLN nebulizer solution Inhale 3 mLs into the lungs three times daily 3/31/21  Yes Stanley Arana MD   sodium chloride (OCEAN) 0.65 % nasal spray 1 spray by Nasal route as needed for Congestion 3/31/21  Yes Stanley Arana MD   famotidine (PEPCID) 20 MG tablet Take 1 tablet by mouth 2 times daily 3/31/21  Yes Stanley Arana MD   guaiFENesin (ROBITUSSIN) 100 MG/5ML SOLN oral solution Take 200 mg by mouth every 4 hours as needed for Cough   Yes Historical Provider, MD   albuterol sulfate HFA (VENTOLIN HFA) 108 (90 Base) MCG/ACT inhaler inhale 2 puffs by mouth every 6 hours if needed for wheezing 1/4/21  Yes Manuelito Beatty MD   enalapril-hydroCHLOROthiazide (VASERETIC) 10-25 MG per tablet take 1 tablet by mouth once daily 12/3/20  Yes Lee Nye MD   montelukast (SINGULAIR) 10 MG tablet Take 1 tablet by mouth nightly take 1 tablet by mouth every evening 11/2/20  Yes Lee Nye MD   fluticasone (FLONASE) 50 MCG/ACT nasal spray 1 spray by Each Nostril route daily   Yes Historical Provider, MD   Multiple Vitamins-Minerals (THERAPEUTIC MULTIVITAMIN-MINERALS) tablet Take 1 tablet by mouth daily   Yes Historical Provider, MD   Nebulizers (COMPRESSOR/NEBULIZER) MISC 1 each by Does not apply route 4 times daily for 14 days Use with medications as directed 2/26/19 12/6/22 Yes Lee Nye MD   loratadine (CLARITIN) 10 MG capsule Take  by mouth. Yes Historical Provider, MD         Physical Exam  General Appearance:    Alert, cooperative, no distress, appears stated age no respiratory distress not using accessory muscles no fever   Head:    Normocephalic, without obvious abnormality, atraumatic   eye examination revealed no jaundice no Chino syndrome    nose revealed no polyps no sinus tenderness    throat examination is unremarkable.     Gait examination is unremarkable   :    Neck:   Supple, symmetrical, trachea midline, no adenopathy;     thyroid:  no enlargement/tenderness/nodules; no carotid    bruit or JVD   Back:     Symmetric, no curvature, ROM normal, no CVA tenderness   Lungs:    AP diameter is increased percussion note is hyperresonant expiration prolonged no rales or rhonchi are audible on both sides   Chest Wall:    No tenderness or deformity      Heart:    Regular rate and rhythm, S1 and S2 normal, no murmur, rub        or gallop no rvh                           Abdomen:                                                 Pulses:                                            Lymph nodes:                    Neurologic:                  Soft, non-tender, bowel sounds active all four quadrants,     no masses, no organomegaly         2+ and symmetric all extremities            Cervical, supraclavicular not enlarged or matted or tender      CNII-XII intact, normal strength 5/5 . Sensation grossly normal  and reflexes normal 2+  throughout     Clubbing No  Lower ext edema No1+   [] , 2 +  [] , 3+   []  Upper ext edema No       Musculoskeletal - no joint swelling or tenderness or synovitis               BP (!) 145/92 (Site: Right Upper Arm, Position: Sitting, Cuff Size: Medium Adult)   Pulse (!) 101   Resp 18   Ht 5' 4\" (1.626 m)   Wt 156 lb (70.8 kg)   SpO2 95%   BMI 26.78 kg/m²     CXR  no recent chest x-ray      CT Scans  since CT    Echo  no recent echo        Assessment  Systemic hypertension  asthmatic bronchitis  Acute exacerbation  Plan  She has recovered well from influenza infection  I advised her to continue to wear the mask and avoid exposure to cold weather. Blood pressure is under good control. He already have had her flu vaccine and COVID-vaccine and Pneumovax.   I plan to see her follow-up in 6 months dictated with Dr. Leanna Mercedes

## 2022-12-09 RX ORDER — ALBUTEROL SULFATE 2.5 MG/3ML
SOLUTION RESPIRATORY (INHALATION)
Qty: 360 ML | Refills: 11 | Status: SHIPPED | OUTPATIENT
Start: 2022-12-09

## 2023-01-03 ENCOUNTER — TELEPHONE (OUTPATIENT)
Dept: PULMONOLOGY | Age: 63
End: 2023-01-03

## 2023-01-03 DIAGNOSIS — J45.40 MODERATE PERSISTENT ASTHMA WITHOUT COMPLICATION: Primary | ICD-10-CM

## 2023-01-03 NOTE — TELEPHONE ENCOUNTER
Patient called stating that she can't stop coughing and is very horse. Her oxygen keeps going up and down from 97 to 93. Would like you to please call in an antibiotic.   Please advise on what you would like to do

## 2023-01-04 NOTE — TELEPHONE ENCOUNTER
Are you able to address this in Dr Maxi Arzate absence? Patient said she will go to urgent care [facility] if she doesn't hear anything.

## 2023-01-05 RX ORDER — AZITHROMYCIN 250 MG/1
TABLET, FILM COATED ORAL
Qty: 6 TABLET | Refills: 0 | Status: SHIPPED | OUTPATIENT
Start: 2023-01-05

## 2023-01-05 RX ORDER — PREDNISONE 10 MG/1
40 TABLET ORAL DAILY
Qty: 20 TABLET | Refills: 0 | Status: SHIPPED | OUTPATIENT
Start: 2023-01-05 | End: 2023-01-10

## 2023-01-18 ENCOUNTER — TELEPHONE (OUTPATIENT)
Dept: PULMONOLOGY | Age: 63
End: 2023-01-18

## 2023-01-18 DIAGNOSIS — J41.0 SIMPLE CHRONIC BRONCHITIS (HCC): Primary | ICD-10-CM

## 2023-01-18 NOTE — TELEPHONE ENCOUNTER
Patient is s/p flu. Is still wheezing, is losing her voice, complains of a cough. She is scheduled 2/10/23 but she wonders if you want her on any other medication?

## 2023-01-19 NOTE — TELEPHONE ENCOUNTER
Patient called again because her wheezing is really bad and would like prednisone called in.  Please advise

## 2023-01-20 RX ORDER — AZITHROMYCIN 250 MG/1
250 TABLET, FILM COATED ORAL SEE ADMIN INSTRUCTIONS
Qty: 6 TABLET | Refills: 0 | Status: SHIPPED | OUTPATIENT
Start: 2023-01-20 | End: 2023-01-25

## 2023-02-10 ENCOUNTER — OFFICE VISIT (OUTPATIENT)
Dept: PULMONOLOGY | Age: 63
End: 2023-02-10
Payer: COMMERCIAL

## 2023-02-10 VITALS
BODY MASS INDEX: 28.34 KG/M2 | WEIGHT: 166 LBS | OXYGEN SATURATION: 96 % | SYSTOLIC BLOOD PRESSURE: 134 MMHG | DIASTOLIC BLOOD PRESSURE: 77 MMHG | RESPIRATION RATE: 20 BRPM | HEART RATE: 74 BPM | HEIGHT: 64 IN

## 2023-02-10 DIAGNOSIS — J45.20 MILD INTERMITTENT ASTHMA WITHOUT COMPLICATION: Primary | ICD-10-CM

## 2023-02-10 DIAGNOSIS — E66.09 CLASS 2 OBESITY DUE TO EXCESS CALORIES WITHOUT SERIOUS COMORBIDITY IN ADULT, UNSPECIFIED BMI: ICD-10-CM

## 2023-02-10 PROCEDURE — 99214 OFFICE O/P EST MOD 30 MIN: CPT | Performed by: INTERNAL MEDICINE

## 2023-02-10 PROCEDURE — 3075F SYST BP GE 130 - 139MM HG: CPT | Performed by: INTERNAL MEDICINE

## 2023-02-10 PROCEDURE — 3078F DIAST BP <80 MM HG: CPT | Performed by: INTERNAL MEDICINE

## 2023-02-10 RX ORDER — AZITHROMYCIN 250 MG/1
250 TABLET, FILM COATED ORAL SEE ADMIN INSTRUCTIONS
Qty: 6 TABLET | Refills: 0 | Status: SHIPPED | OUTPATIENT
Start: 2023-02-10 | End: 2023-02-15

## 2023-02-10 RX ORDER — PREDNISONE 10 MG/1
10 TABLET ORAL 3 TIMES DAILY
Qty: 45 TABLET | Refills: 1 | Status: SHIPPED | OUTPATIENT
Start: 2023-02-10 | End: 2023-02-25

## 2023-02-10 ASSESSMENT — SLEEP AND FATIGUE QUESTIONNAIRES
HOW LIKELY ARE YOU TO NOD OFF OR FALL ASLEEP WHILE LYING DOWN TO REST IN THE AFTERNOON WHEN CIRCUMSTANCES PERMIT: 0
HOW LIKELY ARE YOU TO NOD OFF OR FALL ASLEEP WHEN YOU ARE A PASSENGER IN A CAR FOR AN HOUR WITHOUT A BREAK: 0
HOW LIKELY ARE YOU TO NOD OFF OR FALL ASLEEP WHILE SITTING QUIETLY AFTER LUNCH WITHOUT ALCOHOL: 0
HOW LIKELY ARE YOU TO NOD OFF OR FALL ASLEEP WHILE SITTING INACTIVE IN A PUBLIC PLACE: 0
HOW LIKELY ARE YOU TO NOD OFF OR FALL ASLEEP WHILE WATCHING TV: 0
ESS TOTAL SCORE: 0
HOW LIKELY ARE YOU TO NOD OFF OR FALL ASLEEP IN A CAR, WHILE STOPPED FOR A FEW MINUTES IN TRAFFIC: 0
HOW LIKELY ARE YOU TO NOD OFF OR FALL ASLEEP WHILE SITTING AND TALKING TO SOMEONE: 0
HOW LIKELY ARE YOU TO NOD OFF OR FALL ASLEEP WHILE SITTING AND READING: 0

## 2023-02-10 NOTE — PROGRESS NOTES
Lisa Colbert  2/10/2023    Chief Complaint   Patient presents with    Asthma     2 month follow up   asthmatic bronchitis  systemic hypertension    Lisa Colbert is known to have bronchial asthma which is under good control with the present. Bronchodilator including an inhaled steroid and long-acting beta agonist.  He uses short acting beta agonist on as-needed basis. Asthma is under good control and she did not have to go to the ICU, ER, or urgent care center since her last visit. He does not wake up at night because of breathing difficulty. Denies any symptoms of sinusitis. Patient has been under considerable stress because of family situation. She has had a friend few months back. Did worse on the asthma. Now she has taken the vaccine including the influenza, COVID, and Pneumovax. She has systemic hypertension which is under good control no angina no PND no pedal edema no thromboembolic process. Review of Systems -  General ROS: negative for - chills, fatigue, fever or weight loss  ENT ROS: negative for - headaches, oral lesions or sore throat  Cardiovascular ROS: no chest pain , orthopnea or pnd   Gastrointestinal ROS: no abdominal pain, change in bowel habits, or black or bloody stools  Skin - no rash   Neuro - no blurry vision , no loc .  No focal weakness   msk - no jt tenderness or swelling    Vascular - no claudication , rest completed and negative   Lymphatic - complete and negative   Hematology - oncology - complete and negative   Allergy immunology - complete and negative    no burning or hematuria       LUNG CANCER SCREENING     CRITERIA MET    []     CT ORDERED  []      CRITERIA NOT MET   [x]      REFUSED                    []        REASON CRITERIA NOT MET     SMOKING LESS THAN 30 PY  []      AGE LESS THAN 55 or GREATER 77 YEARS  []      QUIT SMOKING 15 YEARS OR GREATER   []      RECENT CT WITH IN 11 MONTHS    []      LIFE EXPECTANCY < 5 YEARS   []      SIGNS  AND SYMPTOMS OF LUNG CANCER   []           Immunization   Immunization History   Administered Date(s) Administered    COVID-19, PFIZER Bivalent BOOSTER, DO NOT Dilute, (age 12y+), IM, 30 mcg/0.3 mL 10/17/2022    COVID-19, PFIZER PURPLE top, DILUTE for use, (age 15 y+), 30mcg/0.3mL 05/07/2021, 05/28/2021, 12/27/2021    Influenza Vaccine, unspecified formulation 10/28/2017, 10/06/2018    Influenza Virus Vaccine 12/22/2012, 01/04/2014, 01/16/2014, 09/13/2014, 08/15/2015, 10/30/2016    Influenza, AFLURIA (age 1 yrs+), FLUZONE, (age 10 mo+), MDV, 0.5mL 10/30/2016    Influenza, FLUARIX, FLULAVAL, FLUZONE (age 10 mo+) AND AFLURIA, (age 1 y+), PF, 0.5mL 10/28/2017, 10/08/2018, 09/13/2020, 10/17/2022    Influenza, FLUCELVAX, (age 10 mo+), MDCK, PF, 0.5mL 11/26/2019    Pneumococcal Polysaccharide ([id number]) 01/16/2014    Tdap (Boostrix, Adacel) 11/24/2015        Pneumococcal Vaccine     [x] Up to date    [] Indicated   [] Refused  [] Contraindicated       Influenza Vaccine   [x] Up to date    [] Indicated   [] Refused  [] Contraindicated   had taken Covid vaccine PAST MEDICAL HISTORY:         Diagnosis Date    Acute asthma exacerbation 3/29/2021    Acute diverticulitis 2/8/2021    Acute respiratory failure with hypoxia ([facility] [address]     Asthma 4/26/2013    COVID-19 virus antibody negative 08/11/2020    negative    Essential hypertension     Former smoker     Gastroesophageal reflux disease     Hypertension 9/3/2014    Moderate persistent asthma without complication     Pneumonia of right lower lobe due to infectious organism 2/8/2021    Post-nasal drip     Takotsubo cardiomyopathy        Family History:   No family history on file. SURGICAL HISTORY:   No past surgical history on file. Not in a hospital admission.   Allergies   Allergen Reactions    Clindamycin/Lincomycin     Codeine     Dye [Iodides]     Iodine     Levofloxacin     Levofloxacin      Shortness of breath    Other Hives    Pcn [Penicillins]      Shortness of breath    Perflutren Lipid Microsphere Hives    Pollen Extract Itching    Proair Respiclick [Albuterol Sulfate]      Per patient \"allergic to the propellant\"    Sulfa Antibiotics      Redness , shortness of breath    Sulfites      Social History     Tobacco Use   Smoking Status Former    Packs/day: 0.25    Years: 0.50    Pack years: 0.13    Types: Cigarettes    Quit date: 12/18/2012    Years since quitting: 10.1   Smokeless Tobacco Never     Prior to Admission medications    Medication Sig Start Date End Date Taking?  Authorizing Provider   azithromycin (ZITHROMAX Z-YASMEEN) 250 MG tablet Azithromycin 250  mg po two tabs  first day Then 250 mg po daily one tab for next 4 days 1/5/23  Yes Alberto Baker MD   albuterol (PROVENTIL) (2.5 MG/3ML) 0.083% nebulizer solution inhale contents of 1 vial ( 3 milliliters ) in nebulizer by mouth and INTO THE LUNGS every 6 hours if needed for WHEEZING OR SHORTNESS OF BREATH. 12/9/22  Yes MD Michael Barber 18 MCG inhalation capsule inhale contents of 1 capsule by mouth once daily 10/7/22  Yes Flor Higgins MD   ADVAIR DISKUS 500-50 MCG/ACT AEPB diskus inhaler inhale 1 puff by mouth and INTO THE LUNGS twice a day Rinse mouth after use 7/19/22  Yes Flor Higgins MD   VENTOLIN  (90 Base) MCG/ACT inhaler inhale 2 puffs by mouth and INTO THE LUNGS every 6 hours if needed for wheezing 6/27/22  Yes Flor Higgins MD   montelukast (SINGULAIR) 10 MG tablet take 1 tablet by mouth once daily 6/27/22  Yes Flor Higgins MD   Tiotropium Bromide Monohydrate (Michael Coyne IN) Inhale into the lungs   Yes Historical Provider, MD   carvedilol (COREG) 3.125 MG tablet TAKE 1 TABLET BY MOUTH EVERY 12 HOURS WITH FOOD 12/17/21  Yes Historical Provider, MD   lisinopril (PRINIVIL;ZESTRIL) 5 MG tablet  12/16/21  Yes Historical Provider, MD   spironolactone (ALDACTONE) 25 MG tablet  12/16/21  Yes Historical Provider, MD   albuterol sulfate HFA (VENTOLIN HFA) 108 (90 Base) MCG/ACT inhaler Inhale 2 puffs into the lungs 4 times daily as needed for Wheezing 1/14/22  Yes Sandra Minor MD   albuterol (PROVENTIL) (5 MG/ML) 0.5% nebulizer solution Take 1 mL by nebulization 4 times daily as needed for Wheezing 1/14/22  Yes Sandra Minor MD   ipratropium-albuterol (DUONEB) 0.5-2.5 (3) MG/3ML SOLN nebulizer solution Inhale 3 mLs into the lungs three times daily 3/31/21  Yes Tyler Marion MD   sodium chloride (OCEAN) 0.65 % nasal spray 1 spray by Nasal route as needed for Congestion 3/31/21  Yes Tyler Marion MD   famotidine (PEPCID) 20 MG tablet Take 1 tablet by mouth 2 times daily 3/31/21  Yes Tyler Marion MD   guaiFENesin (ROBITUSSIN) 100 MG/5ML SOLN oral solution Take 200 mg by mouth every 4 hours as needed for Cough   Yes Historical Provider, MD   albuterol sulfate HFA (VENTOLIN HFA) 108 (90 Base) MCG/ACT inhaler inhale 2 puffs by mouth every 6 hours if needed for wheezing 1/4/21  Yes Sandra Minor MD   enalapril-hydroCHLOROthiazide (VASERETIC) 10-25 MG per tablet take 1 tablet by mouth once daily 12/3/20  Yes Stella Barnes MD   montelukast (SINGULAIR) 10 MG tablet Take 1 tablet by mouth nightly take 1 tablet by mouth every evening 11/2/20  Yes Stella Barnes MD   fluticasone (FLONASE) 50 MCG/ACT nasal spray 1 spray by Each Nostril route daily   Yes Historical Provider, MD   Multiple Vitamins-Minerals (THERAPEUTIC MULTIVITAMIN-MINERALS) tablet Take 1 tablet by mouth daily   Yes Historical Provider, MD   loratadine (CLARITIN) 10 MG capsule Take  by mouth.    Yes Historical Provider, MD   Nebulizers (COMPRESSOR/NEBULIZER) MISC 1 each by Does not apply route 4 times daily for 14 days Use with medications as directed 2/26/19 12/6/22  Stella Barnes MD         Physical Exam  General Appearance:    Alert, cooperative, no distress, appears stated age no respiratory distress not using accessory muscles no fever   Head:    Normocephalic, without obvious abnormality, atraumatic   eye examination revealed no jaundice no Chino syndrome    nose revealed no polyps no sinus tenderness    throat examination is unremarkable. Gait examination is unremarkable   :    Neck:   Supple, symmetrical, trachea midline, no adenopathy;     thyroid:  no enlargement/tenderness/nodules; no carotid    bruit or JVD   Back:     Symmetric, no curvature, ROM normal, no CVA tenderness   Lungs:    AP diameter is increased percussion note is hyperresonant expiration prolonged no rales or rhonchi are audible on both sides using an accessory muscles. Chest Wall:    No tenderness or deformity      Heart:    Regular rate and rhythm, S1 and S2 normal, no murmur, rub        or gallop no rvh                           Abdomen:                                                 Pulses:                                            Lymph nodes:                    Neurologic:                  Soft, non-tender, bowel sounds active all four quadrants,     no masses, no organomegaly         2+ and symmetric all extremities            Cervical, supraclavicular not enlarged or matted or tender      CNII-XII intact, normal strength 5/5 . Sensation grossly normal  and reflexes normal 2+  throughout     Clubbing No  Lower ext edema No1+   [] , 2 +  [] , 3+   []  Upper ext edema No       Musculoskeletal - no joint swelling or tenderness or synovitis               Wt 166 lb (75.3 kg)   BMI 28.49 kg/m²     CXR  no recent chest x-ray      CT Scans  since CT    Echo  no recent echo        Assessment  Systemic hypertension  asthmatic bronchitis  Obesity  Plan  I advised her to continue the Advair, albuterol, Singulair,. She will also continue the Spiriva because patient is on beta-blocker for the control of hypertension. Beta-blocker can worsen the asthma and this effect cannot be negated by the use of anticholinergics.     Patient was encouraged to lose weight    She already had COVID-vaccine flu vaccine Pneumovax.     She will continue the treatment of hypertension as before    Plan to see her follow-up in few months    Dictated with Dr. Patel Don MD dictation over thank you

## 2023-03-14 NOTE — TELEPHONE ENCOUNTER
LAST VISIT: 2/10/23  NEXT VISIT: 6/6/23    Per last dictation patient is to continue this medication. Please sign for refill if ok. Thank you.

## 2023-04-20 RX ORDER — PREDNISONE 10 MG/1
10 TABLET ORAL 3 TIMES DAILY
Qty: 45 TABLET | Refills: 1 | Status: SHIPPED | OUTPATIENT
Start: 2023-04-20 | End: 2023-05-05

## 2023-06-05 NOTE — TELEPHONE ENCOUNTER
REFILL request is coming over from [address] [place] [address] for Advair 500. Patient is current and has a f/u in June 2023. P/O last dictation pt is to continue on Advair.   Please sign the attached script or make any changes necessary

## 2023-06-06 RX ORDER — FLUTICASONE PROPIONATE AND SALMETEROL 50; 500 UG/1; UG/1
POWDER RESPIRATORY (INHALATION)
Qty: 1 EACH | Refills: 11 | Status: SHIPPED | OUTPATIENT
Start: 2023-06-06

## 2023-06-19 ENCOUNTER — TELEPHONE (OUTPATIENT)
Dept: PULMONOLOGY | Age: 63
End: 2023-06-19

## 2023-08-04 ENCOUNTER — TELEPHONE (OUTPATIENT)
Dept: PULMONOLOGY | Age: 63
End: 2023-08-04

## 2023-08-04 RX ORDER — PREDNISONE 20 MG/1
40 TABLET ORAL DAILY
Qty: 20 TABLET | Refills: 0 | Status: SHIPPED | OUTPATIENT
Start: 2023-08-04 | End: 2023-08-14

## 2023-08-04 RX ORDER — PREDNISONE 10 MG/1
10 TABLET ORAL 3 TIMES DAILY
Qty: 45 TABLET | Refills: 1 | Status: CANCELLED | OUTPATIENT
Start: 2023-08-04 | End: 2023-08-19

## 2023-08-10 ENCOUNTER — OFFICE VISIT (OUTPATIENT)
Dept: PULMONOLOGY | Age: 63
End: 2023-08-10
Payer: COMMERCIAL

## 2023-08-10 VITALS
RESPIRATION RATE: 16 BRPM | SYSTOLIC BLOOD PRESSURE: 147 MMHG | OXYGEN SATURATION: 97 % | DIASTOLIC BLOOD PRESSURE: 90 MMHG | HEIGHT: 64 IN | BODY MASS INDEX: 29.88 KG/M2 | HEART RATE: 98 BPM | WEIGHT: 175 LBS

## 2023-08-10 DIAGNOSIS — J45.40 MODERATE PERSISTENT ASTHMA WITHOUT COMPLICATION: Primary | ICD-10-CM

## 2023-08-10 DIAGNOSIS — J41.0 SIMPLE CHRONIC BRONCHITIS (HCC): ICD-10-CM

## 2023-08-10 PROCEDURE — 3080F DIAST BP >= 90 MM HG: CPT | Performed by: INTERNAL MEDICINE

## 2023-08-10 PROCEDURE — 3077F SYST BP >= 140 MM HG: CPT | Performed by: INTERNAL MEDICINE

## 2023-08-10 PROCEDURE — 99214 OFFICE O/P EST MOD 30 MIN: CPT | Performed by: INTERNAL MEDICINE

## 2023-08-10 RX ORDER — ATORVASTATIN CALCIUM 20 MG/1
20 TABLET, FILM COATED ORAL DAILY
COMMUNITY
Start: 2023-07-11

## 2023-08-10 RX ORDER — PREDNISONE 20 MG/1
40 TABLET ORAL DAILY
Qty: 20 TABLET | Refills: 0 | Status: SHIPPED | OUTPATIENT
Start: 2023-08-10 | End: 2023-08-20

## 2023-08-10 RX ORDER — METOPROLOL SUCCINATE 25 MG/1
25 TABLET, EXTENDED RELEASE ORAL DAILY
COMMUNITY
Start: 2023-07-16

## 2023-08-10 RX ORDER — MONTELUKAST SODIUM 10 MG/1
10 TABLET ORAL DAILY
Qty: 30 TABLET | Refills: 3 | Status: SHIPPED | OUTPATIENT
Start: 2023-08-10

## 2023-08-10 RX ORDER — GLUCOSAMINE/CHONDR SU A SOD 750-600 MG
2000 TABLET ORAL DAILY
COMMUNITY
Start: 2023-06-13

## 2023-08-10 NOTE — PROGRESS NOTES
Christine Sutherland  8/10/2023    Chief Complaint   Patient presents with    Asthma     Patient needs a refill on Singulair    asthmatic bronchitis  systemic hypertension    Christine Sutherland is known to have moderate persistent bronchial asthma that is being successfully treated with albuterol, Advair, and she is also taking Spiriva because she is on beta-blockers for hypertension. The last few days she been having experienced increased symptoms of asthma in the form of cough which is nonproductive. There is increased wheezing and some shortness of breath. He attributes this to changes in the weather and [facility] [employer]. No chest pain no fever no yellow sputum no hemoptysis. Her blood pressure is under good control. Weight is under good control. She already have had COVID vaccine, influenza vaccine and Pneumovax. Has not noted any fever. Review of Systems -  General ROS: negative for - chills, fatigue, fever or weight loss  ENT ROS: negative for - headaches, oral lesions or sore throat  Cardiovascular ROS: no chest pain , orthopnea or pnd   Gastrointestinal ROS: no abdominal pain, change in bowel habits, or black or bloody stools  Skin - no rash   Neuro - no blurry vision , no loc .  No focal weakness   msk - no jt tenderness or swelling    Vascular - no claudication , rest completed and negative   Lymphatic - complete and negative   Hematology - oncology - complete and negative   Allergy immunology - complete and negative    no burning or hematuria       LUNG CANCER SCREENING     CRITERIA MET    []     CT ORDERED  []      CRITERIA NOT MET   [x]      REFUSED                    []        REASON CRITERIA NOT MET     SMOKING LESS THAN 30 PY  []      AGE LESS THAN 55 or GREATER 77 YEARS  []      QUIT SMOKING 15 YEARS OR GREATER   []      RECENT CT WITH IN 11 MONTHS    []      LIFE EXPECTANCY < 5 YEARS   []      SIGNS  AND SYMPTOMS OF LUNG CANCER   []           Immunization

## 2023-11-03 RX ORDER — PREDNISONE 20 MG/1
40 TABLET ORAL DAILY
Qty: 20 TABLET | Refills: 0 | Status: SHIPPED | OUTPATIENT
Start: 2023-11-03 | End: 2023-11-13

## 2023-12-06 NOTE — TELEPHONE ENCOUNTER
Does patient have enough medication for 72 hours:     Next Visit Date:  Future Appointments   Date Time Provider Nathalia Cornell   8/26/2019  3:40 PM Alicia Ashby  [address] [facility] Maintenance   Topic Date Due    Hepatitis C screen  1960    HIV screen  07/15/1976    Cervical cancer screen  07/15/1982    Shingles Vaccine (1 of 2) 07/15/2011    Colon Cancer Screen FIT/FOBT  05/11/2019    Flu vaccine (1) 09/01/2019    Breast cancer screen  06/23/2020    Creatinine monitoring  07/02/2020    Potassium monitoring  07/08/2020    Lipid screen  09/15/2021    DTaP/Tdap/Td vaccine (2 - Td) 11/24/2025    Pneumococcal 0-64 years Vaccine  Completed       No results found for: LABA1C          ( goal A1C is < 7)   No results found for: LABMICR  LDL Calculated (mg/dL)   Date Value   09/15/2016 114   09/04/2015 100       (goal LDL is <100)   AST (U/L)   Date Value   07/02/2019 22     ALT (U/L)   Date Value   07/02/2019 23     BUN (mg/dL)   Date Value   07/02/2019 10     BP Readings from Last 3 Encounters:   07/02/19 (!) 146/84   05/07/19 136/86   02/26/19 (!) 140/78          (goal 120/80)    All Future Testing planned in [facility]  Lab Frequency Next Occurrence   CBC Auto Differential Once 02/26/2019   Comprehensive Metabolic Panel Once 12/18/4828   Lipid Panel Once 02/26/2019   TSH with Reflex Once 02/26/2019               Patient Active Problem List:     Right sided sciatica     Asthma     Hypertension
Flu positive, CBC and CMP non-actionable. Tolerating PO. Will give first dose of tamiflu and discharge home. - Josefina Pretty MD PGY-2

## 2023-12-07 ENCOUNTER — OFFICE VISIT (OUTPATIENT)
Dept: PULMONOLOGY | Age: 63
End: 2023-12-07
Payer: COMMERCIAL

## 2023-12-07 VITALS
DIASTOLIC BLOOD PRESSURE: 92 MMHG | BODY MASS INDEX: 30.9 KG/M2 | OXYGEN SATURATION: 95 % | WEIGHT: 181 LBS | HEART RATE: 89 BPM | RESPIRATION RATE: 20 BRPM | HEIGHT: 64 IN | SYSTOLIC BLOOD PRESSURE: 160 MMHG

## 2023-12-07 DIAGNOSIS — J45.40 MODERATE PERSISTENT ASTHMA WITHOUT COMPLICATION: Primary | ICD-10-CM

## 2023-12-07 PROCEDURE — 3077F SYST BP >= 140 MM HG: CPT | Performed by: INTERNAL MEDICINE

## 2023-12-07 PROCEDURE — 3080F DIAST BP >= 90 MM HG: CPT | Performed by: INTERNAL MEDICINE

## 2023-12-07 PROCEDURE — 99214 OFFICE O/P EST MOD 30 MIN: CPT | Performed by: INTERNAL MEDICINE

## 2023-12-07 RX ORDER — MONTELUKAST SODIUM 10 MG/1
10 TABLET ORAL DAILY
Qty: 30 TABLET | Refills: 11 | Status: SHIPPED | OUTPATIENT
Start: 2023-12-07

## 2023-12-07 RX ORDER — LISINOPRIL 10 MG/1
10 TABLET ORAL DAILY
COMMUNITY
Start: 2023-11-24

## 2023-12-07 RX ORDER — CETIRIZINE HYDROCHLORIDE 10 MG/1
10 TABLET ORAL DAILY
COMMUNITY

## 2023-12-07 NOTE — PROGRESS NOTES
Davey Apgar  2023    Chief Complaint   Patient presents with    Asthma     4 mo f/u asthma     systemic hypertension    Davey Apgar is noted to have moderate persistent asthma which is being treated successfully with albuterol on as-needed basis, Advair. She is also on Spiriva because she has been taking beta-blocker for hypertension. Her blood pressure is under good control asthma has been generally under good control except recently on ving when she had company she has had developed flulike illness. There was no fever she was given a course of steroids and that helped her to control the symptoms    Blood pressure has been under good control no evidence of any heart failure weight is stable. She already have her COVID-vaccine flu vaccine and Pneumovax. No thromboembolic process. Review of Systems -  General ROS: negative for - chills, fatigue, fever or weight loss  ENT ROS: negative for - headaches, oral lesions or sore throat  Cardiovascular ROS: no chest pain , orthopnea or pnd   Gastrointestinal ROS: no abdominal pain, change in bowel habits, or black or bloody stools  Skin - no rash   Neuro - no blurry vision , no loc .  No focal weakness   msk - no jt tenderness or swelling    Vascular - no claudication , rest completed and negative   Lymphatic - complete and negative   Hematology - oncology - complete and negative   Allergy immunology - complete and negative    no burning or hematuria       LUNG CANCER SCREENING     CRITERIA MET    []     CT ORDERED  []      CRITERIA NOT MET   [x]      REFUSED                    []        REASON CRITERIA NOT MET     SMOKING LESS THAN 30 PY  []      AGE LESS THAN 55 or GREATER 77 YEARS  []      QUIT SMOKING 15 YEARS OR GREATER   []      RECENT CT WITH IN 11 MONTHS    []      LIFE EXPECTANCY < 5 YEARS   []      SIGNS  AND SYMPTOMS OF LUNG CANCER   []           Immunization   Immunization History   Administered Date(s) Administered    COVID-19,

## 2023-12-07 NOTE — TELEPHONE ENCOUNTER
LAST VISIT: 8/10/23  NEXT VISIT: 12/7/23    Please address refill request during office visit today. Per last dictation patient is on this medication. Thank you.

## 2023-12-11 RX ORDER — ALBUTEROL SULFATE 2.5 MG/3ML
SOLUTION RESPIRATORY (INHALATION)
Qty: 360 ML | Refills: 11 | Status: SHIPPED | OUTPATIENT
Start: 2023-12-11

## 2024-01-19 NOTE — TELEPHONE ENCOUNTER
LAST VISIT: 12/7/23  NEXT VISIT: 5/7/24    Per last dictation patient is on this medication. Please sign for refill if ok. Thank you.

## 2024-01-22 ENCOUNTER — TELEPHONE (OUTPATIENT)
Dept: PULMONOLOGY | Age: 64
End: 2024-01-22

## 2024-01-22 NOTE — TELEPHONE ENCOUNTER
Patient called and stated that she has had a cough and wheezing since December that just keeps getting worse to the point now she is coughing so bad it makes her get sick.  Patient would like and antibiotic and steroid called into her pharmacy.  Patient last seen 12/7/23.  I verified patients pharmacy.  Please advise

## 2024-01-23 RX ORDER — ALBUTEROL SULFATE 90 UG/1
AEROSOL, METERED RESPIRATORY (INHALATION)
Qty: 1 EACH | Refills: 10 | Status: SHIPPED | OUTPATIENT
Start: 2024-01-23

## 2024-01-23 RX ORDER — PREDNISONE 20 MG/1
40 TABLET ORAL DAILY
Qty: 20 TABLET | Refills: 0 | OUTPATIENT
Start: 2024-01-23 | End: 2024-02-02

## 2024-01-23 NOTE — TELEPHONE ENCOUNTER
Patient called on this, she states she hasn't lost her cough from early December and is now coughing so hard she throws up or wets her pants.     Patient was scheduled for an appt this Thursday 1/25/24.

## 2024-01-25 ENCOUNTER — OFFICE VISIT (OUTPATIENT)
Dept: PULMONOLOGY | Age: 64
End: 2024-01-25
Payer: COMMERCIAL

## 2024-01-25 VITALS
RESPIRATION RATE: 16 BRPM | HEART RATE: 101 BPM | HEIGHT: 64 IN | WEIGHT: 181 LBS | SYSTOLIC BLOOD PRESSURE: 150 MMHG | BODY MASS INDEX: 30.9 KG/M2 | DIASTOLIC BLOOD PRESSURE: 90 MMHG | TEMPERATURE: 97.2 F | OXYGEN SATURATION: 93 %

## 2024-01-25 DIAGNOSIS — J45.40 MODERATE PERSISTENT ASTHMA WITHOUT COMPLICATION: Primary | ICD-10-CM

## 2024-01-25 DIAGNOSIS — J41.0 SIMPLE CHRONIC BRONCHITIS (HCC): ICD-10-CM

## 2024-01-25 DIAGNOSIS — E66.09 CLASS 2 OBESITY DUE TO EXCESS CALORIES WITHOUT SERIOUS COMORBIDITY IN ADULT, UNSPECIFIED BMI: ICD-10-CM

## 2024-01-25 PROCEDURE — 3080F DIAST BP >= 90 MM HG: CPT | Performed by: INTERNAL MEDICINE

## 2024-01-25 PROCEDURE — 99214 OFFICE O/P EST MOD 30 MIN: CPT | Performed by: INTERNAL MEDICINE

## 2024-01-25 PROCEDURE — 3077F SYST BP >= 140 MM HG: CPT | Performed by: INTERNAL MEDICINE

## 2024-01-25 RX ORDER — PREDNISONE 20 MG/1
40 TABLET ORAL DAILY
Qty: 20 TABLET | Refills: 0 | Status: SHIPPED | OUTPATIENT
Start: 2024-01-25 | End: 2024-02-04

## 2024-01-25 RX ORDER — DOXYCYCLINE HYCLATE 100 MG
100 TABLET ORAL 2 TIMES DAILY
Qty: 14 TABLET | Refills: 0 | Status: SHIPPED | OUTPATIENT
Start: 2024-01-25 | End: 2024-02-01

## 2024-01-25 NOTE — PROGRESS NOTES
Maryuri Spangler  1/25/2024    Chief Complaint   Patient presents with    Asthma     Follow up      systemic hypertension    Maryuri Spangler is noted to have chronic persistent moderate asthma which has been successfully treated with Advair, and albuterol.  He also on Spiriva because she has been on beta-blockers as well.  Her blood pressure has been under good control asthma has been generally under good control also.    For the last few days she had developed progressive respiratory infection no fever excessive wheezing and cough and sputum production.  Complains of \"\" sinus problems.  No hemoptysis no chest pain.    She has hypertension that is well-controlled no angina no PND.  She already had COVID-vaccine, flu vaccine and Pneumovax.  No history of thromboembolic process.  She did not check for  COVID test at home.          Review of Systems -  General ROS: negative for - chills, fatigue, fever or weight loss  ENT ROS: negative for - headaches, oral lesions or sore throat  Cardiovascular ROS: no chest pain , orthopnea or pnd   Gastrointestinal ROS: no abdominal pain, change in bowel habits, or black or bloody stools  Skin - no rash   Neuro - no blurry vision , no loc . No focal weakness   msk - no jt tenderness or swelling    Vascular - no claudication , rest completed and negative   Lymphatic - complete and negative   Hematology - oncology - complete and negative   Allergy immunology - complete and negative    no burning or hematuria       LUNG CANCER SCREENING     CRITERIA MET    []     CT ORDERED  []      CRITERIA NOT MET   [x]      REFUSED                    []        REASON CRITERIA NOT MET     SMOKING LESS THAN 30 PY  []      AGE LESS THAN 55 or GREATER 77 YEARS  []      QUIT SMOKING 15 YEARS OR GREATER   []      RECENT CT WITH IN 11 MONTHS    []      LIFE EXPECTANCY < 5 YEARS   []      SIGNS  AND SYMPTOMS OF LUNG CANCER   []           Immunization   Immunization History   Administered Date(s)

## 2024-01-26 ENCOUNTER — TELEPHONE (OUTPATIENT)
Dept: PULMONOLOGY | Age: 64
End: 2024-01-26

## 2024-01-26 RX ORDER — GUAIFENESIN/DEXTROMETHORPHAN 100-10MG/5
5 SYRUP ORAL 3 TIMES DAILY PRN
Qty: 120 ML | Refills: 0 | Status: SHIPPED | OUTPATIENT
Start: 2024-01-26 | End: 2024-02-05

## 2024-01-26 NOTE — TELEPHONE ENCOUNTER
Patient stated that you wanted her to call in today after taking a covid test to let you know the results and she was negative for Covid

## 2024-03-12 RX ORDER — TIOTROPIUM BROMIDE 18 UG/1
CAPSULE ORAL; RESPIRATORY (INHALATION)
Qty: 30 CAPSULE | Refills: 7 | Status: SHIPPED | OUTPATIENT
Start: 2024-03-12

## 2024-03-13 ENCOUNTER — TELEPHONE (OUTPATIENT)
Dept: PULMONOLOGY | Age: 64
End: 2024-03-13

## 2024-03-13 NOTE — TELEPHONE ENCOUNTER
Patient called requesting prescription for predisone.  Her asthma is flaring up right now and she is have symptoms of wheezing and shortness of breath.   Predisone burst placed.  Please sign if accepted.  Thank you

## 2024-03-15 RX ORDER — PREDNISONE 10 MG/1
10 TABLET ORAL
Qty: 30 TABLET | Refills: 0
Start: 2024-03-15 | End: 2024-03-27

## 2024-04-26 NOTE — PROGRESS NOTES
Immature Granulocytes Absolute 0.0 0.0 - 0.2 10*3/uL    Lymphocytes Absolute 2.1 1.2 - 4.0 10*3/uL    Monocytes Absolute 0.7 0.1 - 1.0 10*3/uL    Eosinophils Absolute 0.9 (H) 0.0 - 0.5 10*3/uL    Basophils Absolute 0.1 0.0 - 0.2 10*3/uL    nRBC 0 0 - 0 %   PROTHROMBIN TIME (PT)   Result Value Ref Range    PT 12.7 12.3 - 14.8 sec    INR 0.96 0.91 - 1.16   APTT   Result Value Ref Range    aPTT 31.0 25.0 - 35.0 sec   D-Dimer, Quantitative   Result Value Ref Range    D-Dimer, Quant 0.50 (H) 0.27 - 0.49 mcg/mL FEU   Troponin   Result Value Ref Range    Troponin I 0.00 0.00 - 0.04 ng/mL   Basic Metabolic Panel   Result Value Ref Range    Glucose 97 70 - 100 mg/dL    BUN 15 7 - 25 mg/dL    Creatinine 0.65 0.60 - 1.20 mg/dL    Sodium 136 136 - 145 meq/L    Potassium 4.9 3.5 - 5.1 meq/L    Chloride 101 98 - 107 meq/L    CO2 24 21 - 31 meq/L    Calcium 9.4 8.6 - 10.3 mg/dL    EGFR IF NonAfrican American >60 >60 ml/min/1.73sq m    eGFR African American >60 >60 ml/min/1.73sq m   Brain Natriuretic Peptide   Result Value Ref Range    BNP 16 0 - 100 pg/mL   POCT COVID-19, Antigen   Result Value Ref Range    SARS-COV-2, POC NEGATIVE NEGATIVE       XR CHEST 1 VIEW    Result Date: 4/26/2024  History:  Chest pain Exam/Technique:  Portable  upright AP chest Comparison:   04/23/2024 Findings:  Small area of right parahilar subsegmental atelectasis is displayed.   No other new acute pulmonary or pleural abnormalities.   Cardiac and mediastinal contours display no gross abnormalities    There is subsegmental atelectasis in the right midlung. Otherwise unchanged. Electronically signed: Bernard Mendenhall.    XR CHEST 1 VIEW    Result Date: 4/23/2024  XR CHEST 1 VIEW   INDICATION: Shortness of breath COMPARISON: XR chest 02/02/2023 TECHNIQUE: AP upright view obtained. FINDINGS: Lines/tubes: None. Respiratory: No pneumothorax, pleural effusion, or consolidation. Cardiomediastinum: Nonenlarged.    *No acute pulmonary process. Approved

## 2024-04-29 ENCOUNTER — OFFICE VISIT (OUTPATIENT)
Dept: PULMONOLOGY | Age: 64
End: 2024-04-29
Payer: COMMERCIAL

## 2024-04-29 VITALS
WEIGHT: 185 LBS | HEIGHT: 64 IN | BODY MASS INDEX: 31.58 KG/M2 | SYSTOLIC BLOOD PRESSURE: 129 MMHG | OXYGEN SATURATION: 94 % | HEART RATE: 72 BPM | DIASTOLIC BLOOD PRESSURE: 73 MMHG | RESPIRATION RATE: 16 BRPM

## 2024-04-29 DIAGNOSIS — Z87.891 FORMER CIGARETTE SMOKER: ICD-10-CM

## 2024-04-29 DIAGNOSIS — E66.09 CLASS 2 OBESITY DUE TO EXCESS CALORIES WITHOUT SERIOUS COMORBIDITY IN ADULT, UNSPECIFIED BMI: ICD-10-CM

## 2024-04-29 DIAGNOSIS — E66.9 OBESITY (BMI 30.0-34.9): ICD-10-CM

## 2024-04-29 DIAGNOSIS — J45.40 MODERATE PERSISTENT ASTHMA WITHOUT COMPLICATION: Primary | ICD-10-CM

## 2024-04-29 PROCEDURE — 3078F DIAST BP <80 MM HG: CPT | Performed by: NURSE PRACTITIONER

## 2024-04-29 PROCEDURE — 99214 OFFICE O/P EST MOD 30 MIN: CPT | Performed by: NURSE PRACTITIONER

## 2024-04-29 PROCEDURE — 3074F SYST BP LT 130 MM HG: CPT | Performed by: NURSE PRACTITIONER

## 2024-04-29 RX ORDER — PREDNISONE 10 MG/1
TABLET ORAL
Qty: 30 TABLET | Refills: 0 | Status: SHIPPED | OUTPATIENT
Start: 2024-04-29

## 2024-04-29 ASSESSMENT — ENCOUNTER SYMPTOMS
EYES NEGATIVE: 1
ALLERGIC/IMMUNOLOGIC NEGATIVE: 1
GASTROINTESTINAL NEGATIVE: 1

## 2024-11-08 ENCOUNTER — OFFICE VISIT (OUTPATIENT)
Dept: PULMONOLOGY | Age: 64
End: 2024-11-08
Payer: COMMERCIAL

## 2024-11-08 VITALS
RESPIRATION RATE: 16 BRPM | WEIGHT: 199 LBS | SYSTOLIC BLOOD PRESSURE: 150 MMHG | HEIGHT: 64 IN | OXYGEN SATURATION: 96 % | HEART RATE: 96 BPM | BODY MASS INDEX: 33.97 KG/M2 | DIASTOLIC BLOOD PRESSURE: 87 MMHG

## 2024-11-08 DIAGNOSIS — J45.21 MILD INTERMITTENT ASTHMA WITH EXACERBATION: ICD-10-CM

## 2024-11-08 DIAGNOSIS — J06.9 VIRAL UPPER RESPIRATORY TRACT INFECTION: Primary | ICD-10-CM

## 2024-11-08 DIAGNOSIS — J41.0 SIMPLE CHRONIC BRONCHITIS (HCC): ICD-10-CM

## 2024-11-08 PROCEDURE — 3077F SYST BP >= 140 MM HG: CPT | Performed by: NURSE PRACTITIONER

## 2024-11-08 PROCEDURE — 3079F DIAST BP 80-89 MM HG: CPT | Performed by: NURSE PRACTITIONER

## 2024-11-08 PROCEDURE — 99214 OFFICE O/P EST MOD 30 MIN: CPT | Performed by: NURSE PRACTITIONER

## 2024-11-08 RX ORDER — PREDNISONE 20 MG/1
20 TABLET ORAL 2 TIMES DAILY
Qty: 10 TABLET | Refills: 0 | Status: SHIPPED | OUTPATIENT
Start: 2024-11-08 | End: 2024-11-13

## 2024-11-08 RX ORDER — FLUTICASONE PROPIONATE AND SALMETEROL 500; 50 UG/1; UG/1
1 POWDER RESPIRATORY (INHALATION) 2 TIMES DAILY
Qty: 180 EACH | Refills: 3 | Status: SHIPPED | OUTPATIENT
Start: 2024-11-08

## 2024-11-08 RX ORDER — TIOTROPIUM BROMIDE 18 UG/1
18 CAPSULE ORAL; RESPIRATORY (INHALATION) DAILY
Qty: 90 CAPSULE | Refills: 3 | Status: SHIPPED | OUTPATIENT
Start: 2024-11-08

## 2024-11-08 RX ORDER — ALBUTEROL SULFATE 5 MG/ML
5 SOLUTION RESPIRATORY (INHALATION) 4 TIMES DAILY PRN
Qty: 360 EACH | Refills: 3 | Status: SHIPPED | OUTPATIENT
Start: 2024-11-08

## 2024-11-08 RX ORDER — ALBUTEROL SULFATE 90 UG/1
AEROSOL, METERED RESPIRATORY (INHALATION)
Qty: 3 EACH | Refills: 3 | Status: SHIPPED | OUTPATIENT
Start: 2024-11-08

## 2024-11-08 ASSESSMENT — ENCOUNTER SYMPTOMS
GASTROINTESTINAL NEGATIVE: 1
ALLERGIC/IMMUNOLOGIC NEGATIVE: 1
EYES NEGATIVE: 1

## 2024-11-08 NOTE — PROGRESS NOTES
0.5 years (0.1 ttl pk-yrs)     Types: Cigarettes     Start date: 2012     Quit date: 2012     Years since quittin.8    Smokeless tobacco: Never   Vaping Use    Vaping status: Never Used   Substance and Sexual Activity    Alcohol use: No    Drug use: No    Sexual activity: Yes     Partners: Male   Other Topics Concern    Not on file   Social History Narrative    Not on file     Social Determinants of Health     Financial Resource Strain: Low Risk  (2024)    Received from [facility]    Overall Financial Resource Strain (CARDIA)     Difficulty of Paying Living Expenses: Not hard at all   Food Insecurity: No Food Insecurity (2024)    Received from [facility]    Hunger Vital Sign     Within the past 12 months, you worried that your food would run out before you got the money to buy more.: Never true     Ran Out of Food in the Last Year: Not on file   Transportation Needs: No Transportation Needs (2024)    Received from [facility]    Transportation     In the past 12 months, has lack of transportation kept you from medical appointments or from getting medications?: No     Lack of Transportation (Non-Medical): Not on file   Physical Activity: Not on file   Stress: Not on file   Social Connections: Not on file   Intimate Partner Violence: Unknown (2024)    Received from [facility]    Humiliation, Afraid, Rape, and Kick questionnaire     Fear of Current or Ex-Partner: No     Emotionally Abused: Not on file     Physically Abused: Not on file     Sexually Abused: Not on file   Housing Stability: Low Risk  (2024)    Received from [facility]    Housing Stability Vital Sign     Unable to Pay for Housing in the Last Year: Not on file     Number of Places Lived in the Last Year: Not on file

## 2024-11-11 RX ORDER — ALBUTEROL SULFATE 0.83 MG/ML
SOLUTION RESPIRATORY (INHALATION)
Refills: 0 | OUTPATIENT
Start: 2024-11-11

## 2024-11-12 ENCOUNTER — TELEPHONE (OUTPATIENT)
Dept: PULMONOLOGY | Age: 64
End: 2024-11-12

## 2024-11-12 DIAGNOSIS — J45.21 MILD INTERMITTENT ASTHMA WITH EXACERBATION: Primary | ICD-10-CM

## 2024-11-12 RX ORDER — ALBUTEROL SULFATE 0.83 MG/ML
SOLUTION RESPIRATORY (INHALATION)
Refills: 0 | OUTPATIENT
Start: 2024-11-12

## 2024-11-12 NOTE — TELEPHONE ENCOUNTER
Previous script was for Albuterol 5 mL. Cancelled, wrong dose. Current request was denied due to duplicate.

## 2024-11-12 NOTE — TELEPHONE ENCOUNTER
Patient called, we received a few requests for her albuterol. All previous scripts are removed from the chart.     Writer pended a script for Albuterol 0.083% for her nebulizer. Please sign if you agree.

## 2024-11-13 ENCOUNTER — TELEPHONE (OUTPATIENT)
Dept: PULMONOLOGY | Age: 64
End: 2024-11-13

## 2024-11-13 RX ORDER — ALBUTEROL SULFATE 0.83 MG/ML
2.5 SOLUTION RESPIRATORY (INHALATION) 4 TIMES DAILY
Qty: 360 ML | Refills: 3 | Status: SHIPPED | OUTPATIENT
Start: 2024-11-13

## 2024-11-13 NOTE — TELEPHONE ENCOUNTER
A call came in from Kelly who stated that her [facility] pharmacy  has not filled the nebulizing solution ordered by Haider Burrows CNP. Writer stated that pharmacy did confirm e prescribed albuterol neb solution this morning on 11/13/24 at 7:13 AM. Writer told patient a call would be made to [facility] to check status and a call back made to her with update.  The Pharmacist at [facility] verified the Rx and said it will be ready for  this evening. Patient was called and informed expressing understanding.

## 2024-12-11 ENCOUNTER — NURSE ONLY (OUTPATIENT)
Dept: PULMONOLOGY | Age: 64
End: 2024-12-11

## 2024-12-26 ENCOUNTER — TELEPHONE (OUTPATIENT)
Dept: PULMONOLOGY | Age: 64
End: 2024-12-26

## 2024-12-26 NOTE — TELEPHONE ENCOUNTER
Spoke with [facility] at [facility] and they had a hold on it because they were trying to run it through insurance but patient pays cash for it.  Pharmacy is filling it and patient was notified and she agreed to  at that location

## 2024-12-26 NOTE — TELEPHONE ENCOUNTER
Patient called and stated that her pharmacy is not wanting to fill her albuterol.  I called the [facility] on [address] that it was sent to and they stated the scipt is at [facility].  I tried to call there and was on hold for over 5 min.  I will call back after 1

## 2025-01-22 RX ORDER — ALBUTEROL SULFATE 90 UG/1
2 INHALANT RESPIRATORY (INHALATION) 4 TIMES DAILY PRN
Qty: 54 G | Refills: 1 | Status: SHIPPED | OUTPATIENT
Start: 2025-01-22

## 2025-01-23 RX ORDER — LEVALBUTEROL TARTRATE 45 UG/1
AEROSOL, METERED ORAL
Refills: 0 | OUTPATIENT
Start: 2025-01-23

## 2025-02-03 ENCOUNTER — TRANSCRIBE ORDERS (OUTPATIENT)
Dept: ADMINISTRATIVE | Age: 65
End: 2025-02-03

## 2025-02-03 DIAGNOSIS — R10.32 LT GROIN PAIN: Primary | ICD-10-CM

## 2025-02-11 NOTE — PROGRESS NOTES
2020    TELEHEALTH EVALUATION -- Audio/Visual (During OMHKV-92 public health emergency)    Patient and physician are located in their individual locations. This is visit is completed via Mover application []/ Doxy. me[] / Telephone [x]     HPI:    Cassius Jiménez (:  1960) has requested an audio/video evaluation for the following concern(s): Who that also is known to have bronchial asthma which is moderate persistent. Her asthma has been controlled well with the use of bronchodilators. He does not have to use rescue inhaler very frequently. However few weeks back she developed an acute attack of asthma and has to be taken to the hospital middle of the night. She stayed in the hospital for 3 days and was aggressively treated for the asthma with steroids antibiotics and bronchodilators. Since discharge from the hospital she has been doing well. She is taking her bronchodilators regularly she has not noted any wheezing she has not noted any cough no sputum production. No chest pain no pedal edema no thromboembolic process no nasal stuffiness postnasal discharge. No acid reflux. She thinks her asthma got worse because of allergies rather than anything else she is taking antihistaminics on a regular basis. Her hypertension has been under good control. She is taking her antihypertensive therapy regularly. Taking her medication bronchodilators very regularly. Labs from the recent hospitalization were reviewed      Review of Systems was completed and it was unremarkable. Prior to Visit Medications    Medication Sig Taking?  Authorizing Provider   fluticasone (FLONASE) 50 MCG/ACT nasal spray 1 spray by Each Nostril route daily Yes Historical Provider, MD   predniSONE (DELTASONE) 20 MG tablet Take 2 tablets by mouth daily for 10 days Yes Marcio Mishra MD   enalapril-hydroCHLOROthiazide (VASERETIC) 10-25 MG per tablet take 1 tablet by mouth once daily Yes Petrona Sarabia MD omeprazole (PRILOSEC) 20 MG delayed release capsule take 1 capsule by mouth every 12 hours Yes Jp Field MD   predniSONE (DELTASONE) 10 MG tablet 40 mg po x 5 days Yes Valdo Rogers MD   montelukast (SINGULAIR) 10 MG tablet take 1 tablet by mouth every evening Yes Jp Field MD   albuterol sulfate HFA (VENTOLIN HFA) 108 (90 Base) MCG/ACT inhaler inhale 2 puffs by mouth every 6 hours if needed for wheezing Yes Valdo Rogers MD   Multiple Vitamins-Minerals (THERAPEUTIC MULTIVITAMIN-MINERALS) tablet Take 1 tablet by mouth daily Yes Historical Provider, MD   predniSONE (DELTASONE) 20 MG tablet Take 30mgs (1 1/2 tabs) daily for two weeks, then 20mgs (1 tab) daily for two weeks then proceed to different script for 10mgs daily until seen in office Yes EDUARDO Gasca CNP   albuterol (PROVENTIL) (2.5 MG/3ML) 0.083% nebulizer solution Take 3 mLs by nebulization every 6 hours as needed for Wheezing Yes EDUARDO Gasca CNP   fluticasone-salmeterol (ADVAIR) 500-50 MCG/DOSE diskus inhaler INHALE 1 PUFF INTO THE LUNGS EVERY 12 HOURS Yes Jp Field MD   albuterol (PROVENTIL) (2.5 MG/3ML) 0.083% nebulizer solution inhale contents of 1 vial in nebulizer every 6 hours if needed for wheezing or shortness of breath Yes Jp Field MD   ipratropium-albuterol (DUONEB) 0.5-2.5 (3) MG/3ML SOLN nebulizer solution Inhale 3 mLs into the lungs every 4 hours Yes Jp Field MD   acetaminophen (APAP EXTRA STRENGTH) 500 MG tablet Take 500 mg by mouth every 6 hours as needed for Pain. Yes Historical Provider, MD   Loratadine (CLARITIN) 10 MG CAPS Take  by mouth.  Yes Historical Provider, MD   ondansetron (ZOFRAN-ODT) 4 MG disintegrating tablet Take by mouth 2 times daily as needed  Historical Provider, MD   Nebulizers (COMPRESSOR/NEBULIZER) MISC 1 each by Does not apply route 4 times daily for 14 days Use with medications as directed  Jp Field MD   nitroGLYCERIN (NITROSTAT) 0.4 MG SL tablet Place 1 tablet under the tongue every 5 minutes as needed for Chest pain  Patient not taking: Reported on 2020  Maximiliano Mendez MD   fluticasone [facility]) 50 MCG/ACT nasal spray 2 sprays by Nasal route daily  Paula Mathis MD       Social History     Tobacco Use    Smoking status: Former Smoker     Packs/day: 0.25     Years: 0.50     Pack years: 0.12     Last attempt to quit: 2012     Years since quittin.6    Smokeless tobacco: Never Used   Substance Use Topics    Alcohol use: No    Drug use: No            RECORD REVIEW: Previous medical records were reviewed at today's visit.     Wt Readings from Last 3 Encounters:   20 159 lb (72.1 kg)   19 168 lb (76.2 kg)   10/11/19 166 lb (75.3 kg)       Results for orders placed or performed in visit on 20   CBC with Differential   Result Value Ref Range    WBC 9.61 4.00 - 10.60 10*3/uL    RBC 4.49 3.80 - 5.00 10*6/uL    Hemoglobin 14.3 12.0 - 15.0 g/dL    Hematocrit 39.4 36.0 - 45.0 %    MCV 87.8 82.0 - 98.0 fL    MCH 31.8 27.0 - 33.0 pg    MCHC 36.3 (H) 32.0 - 35.0 g/dL    RDW 12.2 11.5 - 15.0 %    Platelets 644 965 - 475 10*3/uL    Neutrophils % 50.8 40.0 - 72.0 %    Immature Granulocytes 0.3 0.0 - 1.0 %    Lymphocytes % 24.7 20.0 - 45.0 %    Monocytes % 12.1 (H) 5.0 - 12.0 %    Eosinophils % 10.9 (H) 0.0 - 6.0 %    Basophils % 1.2 (H) 0.0 - 1.0 %    Neutrophils Absolute 4.9 1.6 - 7.6 10*3/uL    Absolute Immature Granulocyte 0.0 0.0 - 0.2 10*3/uL    Lymphocytes Absolute 2.4 1.2 - 4.0 10*3/uL    Monocytes Absolute 1.2 (H) 0.1 - 1.0 10*3/uL    Eosinophils Absolute 1.1 (H) 0.0 - 0.5 10*3/uL    Basophils Absolute 0.1 0.0 - 0.2 10*3/uL    nRBC 0 0 - 0 %   D-Dimer, Quantitative   Result Value Ref Range    D-Dimer, Quant 0.35 0.01 - 0.49 mcg/mL FEU   APTT   Result Value Ref Range    aPTT 26.4 25.0 - 35.0 sec   PROTHROMBIN TIME (PT)   Result Value Ref Range    PT 12.0 (L) 12.3 - 14.8 sec    INR 0.89 (L) 0.91 - 1.16   Troponin   Result Value Ref Range    Troponin I 0.01 0.00 - 0.04 ng/mL   Basic Metabolic Panel   Result Value Ref Range    Glucose 117 (H) 70 - 100 mg/dL    BUN 17 7 - 25 mg/dL    CREATININE 0.66 0.60 - 1.20 mg/dL    Sodium 134 (L) 136 - 145 meq/L    Potassium 4.0 3.5 - 5.1 meq/L    Chloride 101 98 - 107 meq/L    CO2 25 21 - 31 meq/L    Calcium 9.4 8.6 - 10.3 mg/dL    EGFR IF NonAfrican American >60 >60 ml/min/1.73sq m    eGFR African American >60 >60 ml/min/1.73sq m   Brain Natriuretic Peptide   Result Value Ref Range    BNP 16 0 - 100 pg/mL   Urinalysis   Result Value Ref Range    Color, UA YELLOW YELLOW    Appearance CLEAR CLEAR    Specific Gravity, Urine 1.015 1.015 - 1.02    pH 7.0 5.0 - 8.0    Protein, Urine NEGATIVE NEGATIVE mg/dL    Glucose, UA NEGATIVE NEGATIVE mg/dL    Ketones, Urine NEGATIVE NEGATIVE mg/dL    Bilirubin Urine NEGATIVE NEGATIVE    Blood, Urine SMALL (A) NEGATIVE    Nitrite, Urine NEGATIVE NEGATIVE    Leukocyte Esterase, Urine TRACE (A) NEGATIVE    WBC, UA 0-2 (A) NONE SEEN /HPF    RBC 0-2 (A) NONE SEEN /HPF    Epithelial Cells, UA OCC FEW,OCC,NO /LPF   COVID-19    Specimen: Brain   Result Value Ref Range    SARS-CoV-2 Not Detected Not Detect   CBC with Differential   Result Value Ref Range    WBC 10.05 4.00 - 10.60 10*3/uL    RBC 4.21 3.80 - 5.00 10*6/uL    Hemoglobin 13.2 12.0 - 15.0 g/dL    Hematocrit 38.3 36.0 - 45.0 %    MCV 91.0 82.0 - 98.0 fL    MCH 31.4 27.0 - 33.0 pg    MCHC 34.5 32.0 - 35.0 g/dL    RDW 12.5 11.5 - 15.0 %    Platelets 263 475 - 236 10*3/uL    Neutrophils % 85.4 (H) 40.0 - 72.0 %    Immature Granulocytes 0.7 0.0 - 1.0 %    Lymphocytes % 7.5 (L) 20.0 - 45.0 %    Monocytes % 6.2 5.0 - 12.0 %    Eosinophils % 0.0 0.0 - 6.0 %    Basophils % 0.2 0.0 - 1.0 %    Neutrophils Absolute 8.6 (H) 1.6 - 7.6 10*3/uL    Absolute Immature Granulocyte 0.1 0.0 - 0.2 10*3/uL    Lymphocytes Absolute 0.8 (L) 1.2 - 4.0 10*3/uL    Monocytes Absolute 0.6 0.1 - 1.0 10*3/uL    Eosinophils Absolute 0.0 0.0 - 0.5 10*3/uL    Basophils Absolute 0.0 0.0 - 0.2 10*3/uL    nRBC 0 0 - 0 %   Basic Metabolic Panel   Result Value Ref Range    Glucose 136 (H) 70 - 100 mg/dL    BUN 11 7 - 25 mg/dL    CREATININE 0.55 (L) 0.60 - 1.20 mg/dL    Sodium 134 (L) 136 - 145 meq/L    Potassium 3.9 3.5 - 5.1 meq/L    Chloride 99 98 - 107 meq/L    CO2 25 21 - 31 meq/L    Calcium 9.5 8.6 - 10.3 mg/dL    EGFR IF NonAfrican American >60 >60 ml/min/1.73sq m    eGFR African American >60 >60 ml/min/1.73sq m   Magnesium, RBC   Result Value Ref Range    Magnesium 2.1 1.9 - 2.7 mg/dL   Phosphorus   Result Value Ref Range    Phosphorus 4.0 2.5 - 5.0 mg/dL   POC Glucose Fingerstick   Result Value Ref Range    POC Glucose 193 (H) 70 - 100 mg/dL   POC Glucose Fingerstick   Result Value Ref Range    POC Glucose 161 (H) 70 - 100 mg/dL   POC Glucose Fingerstick   Result Value Ref Range    POC Glucose 134 (H) 70 - 100 mg/dL   POC Glucose Fingerstick   Result Value Ref Range    POC Glucose 122 (H) 70 - 100 mg/dL   POC Glucose Fingerstick   Result Value Ref Range    POC Glucose 129 (H) 70 - 100 mg/dL   POC Glucose Fingerstick   Result Value Ref Range    POC Glucose 140 (H) 70 - 100 mg/dL       Xr Chest Portable    Result Date: 2020  R/O Infiltrates [facility] Department of Radiology [address], [place] [phone]    ========================================================================== Patient Name: Merlin Mace : 1960 Sex: F Age: Liam Navarro MRN: [id number] Pt. Location: [facility] Patient Status: E Visit #: [id number] Ordered Date: 2020 1:05:00 AM Completed Date: 2020 02:20 AM Requesting Provider: Abraham Wharton Attending Provider: Abraham Wharton Report Copy To:  Signs ^ Symptoms: Shortness of Breath History: See Comments Comments: R/O Infiltrates Exam: PORTABLE CHEST 1 VIEW Accession #: [id number] ========================================================================== PORTABLE CHEST 1 VIEW  8/11/2020 2:20 AM  CLINICAL INDICATIONS: Shortness of Breath  TECHNOLOGIST COMMENTS: increasing shortness of breath and wheezing over the last 2 days which is signs of asthma attack for her history of hypertension, asthma, seasonal allergies, GERD  QUESTION FOR THE RADIOLOGIST: R/O Infiltrates  PROTOCOL: AP(PA) view was obtained. COMPARISON: 8/2/2019  FINDINGS: Portable AP upright view of the chest was performed. Cardiac silhouette within normal limits. No vascular congestion or airspace consolidation. No pleural effusion or pneumothorax. Healed right rib fractures. IMPRESSION:  *  No acute cardiopulmonary process. Electronically signed: Burt Maravilla. Transcribed by: Jaiden, User Resident: Electronically Signed by: Brandy Fuentes @ 08/11/2020 02:45 AM      PHYSICAL EXAMINATION:  Due to this being a TeleHealth encounter, evaluation of the following organ systems is limited: Vitals/Constitutional/EENT/Resp/CV/GI//MS/Neuro/Skin/Heme-Lymph-Imm. Constitutional: [x] Appears well-developed and well-nourished. [x] Abnormal obesity   mental status  [x] Alert and awake  [x] Oriented to person/place/time [x]Able to follow commands    [x] No apparent distress      Eyes:  EOM    [x]  Normal  [] Abnormal-  Sclera  [x]  Normal  [] Abnormal -         Discharge [x]  None visible  [] Abnormal -    HENT:   [x] Normocephalic, atraumatic. [] Abnormal shaped head   [x] Mouth/Throat: Mucous membranes are moist.     Ears [x] Normal  [] Abnormal-    Neck: [x] Normal range of motion [x] Supple [x] No visualized mass. Pulmonary/Chest: [x] Respiratory effort normal.  [x] No visualized signs of difficulty breathing or respiratory distress        [] Abnormal      Musculoskeletal:   [x] Normal range of motion. [x] Normal gait with no signs of ataxia. [x]  No signs of cyanosis of the peripheral portions of extremities.          [] Abnormal       Neurological:        [x] Normal cranial nerve (limited exam to video visit) [x] No focal weakness observed       [] Abnormal          Speech       [x] Normal   [] Abnormal     Skin:        [x] No rash on visible skin  [x] Normal  [] Abnormal     Psychiatric:       [x] Normal  [] Abnormal        [x] Normal Mood  [] Anxious appearing        Other Pertinent Exam Findings:       ASSESSMENT:  1. Moderate persistent asthma without complication    2. Essential hypertension    3. Obesity (BMI 30.0-34. 9)          Plan:   1. Patient had an acute episode of asthma recently which is relieved with the use of bronchodilators steroids and visit to the hospital.  2.   3. She has been doing well since discharge from the hospital.  4.   5. She was advised to continue present bronchodilators along with inhaled steroid long-acting beta agonist and use short-acting beta agonist on as-needed basis. 6.   7. Continue treatment of hypertension as before which is under good control  8.   9. She was encouraged to lose weight  10.   11. I did give her a prescription for prednisone to keep with her and use it in case she notices that her symptoms are getting any worse. 12.   13. Already had flu vaccine  14.   15. She already Pneumovax  16.   17. He was advised to get flu vaccine in the fall again  18.   19. I plan to see her follow-up in few months  20.   21. Dictated with Dr. Angelique Rendon MD dictation over thank you  22. An  electronic signature was used to authenticate this note. --Christine Coates MD on 8/24/2020 at 11:50 AM    9}    Pursuant to the emergency declaration under the [facility], [facility] waiver authority and the [employer] Act, this Virtual  Visit was conducted, with patient's consent, to reduce the patient's risk of exposure to COVID-19 and provide continuity of care for an established patient.     Services were provided through a video synchronous discussion virtually to substitute for in-person

## 2025-02-28 ENCOUNTER — HOSPITAL ENCOUNTER (OUTPATIENT)
Dept: GENERAL RADIOLOGY | Age: 65
End: 2025-02-28
Payer: COMMERCIAL

## 2025-02-28 ENCOUNTER — HOSPITAL ENCOUNTER (OUTPATIENT)
Age: 65
Discharge: HOME | End: 2025-02-28
Payer: COMMERCIAL

## 2025-02-28 DIAGNOSIS — M25.552 LEFT HIP PAIN: ICD-10-CM

## 2025-02-28 PROCEDURE — 73502 X-RAY EXAM HIP UNI 2-3 VIEWS: CPT

## 2025-03-05 DIAGNOSIS — J45.21 MILD INTERMITTENT ASTHMA WITH EXACERBATION: ICD-10-CM

## 2025-03-05 RX ORDER — ALBUTEROL SULFATE 0.83 MG/ML
2.5 SOLUTION RESPIRATORY (INHALATION) EVERY 6 HOURS PRN
Qty: 360 ML | Refills: 7 | Status: SHIPPED | OUTPATIENT
Start: 2025-03-05

## 2025-03-05 NOTE — TELEPHONE ENCOUNTER
LAST VISIT: 11/8/24 with HARJEET Burrows  NEXT VISIT: 5/8/25 with Dr Rose    Please sign for refill if ok. Thank you.

## 2025-03-06 ENCOUNTER — OFFICE VISIT (OUTPATIENT)
Age: 65
End: 2025-03-06
Payer: COMMERCIAL

## 2025-03-06 VITALS
WEIGHT: 199 LBS | HEART RATE: 95 BPM | HEIGHT: 64 IN | SYSTOLIC BLOOD PRESSURE: 141 MMHG | BODY MASS INDEX: 33.97 KG/M2 | DIASTOLIC BLOOD PRESSURE: 80 MMHG | OXYGEN SATURATION: 95 % | TEMPERATURE: 97.2 F

## 2025-03-06 DIAGNOSIS — R10.32 LEFT GROIN PAIN: Primary | ICD-10-CM

## 2025-03-06 PROCEDURE — 3079F DIAST BP 80-89 MM HG: CPT | Performed by: NURSE PRACTITIONER

## 2025-03-06 PROCEDURE — 99203 OFFICE O/P NEW LOW 30 MIN: CPT | Performed by: NURSE PRACTITIONER

## 2025-03-06 PROCEDURE — 3077F SYST BP >= 140 MM HG: CPT | Performed by: NURSE PRACTITIONER

## 2025-03-06 ASSESSMENT — ENCOUNTER SYMPTOMS
COUGH: 0
SHORTNESS OF BREATH: 0
SORE THROAT: 0
RHINORRHEA: 0

## 2025-03-06 NOTE — PROGRESS NOTES
[facility] General Surgery   Almas Crum MD, FACS  [place] MBertha Marshallry, APRN-CNP  [address], [address]  [place] [address]  P: [phone], F: [phone]    General and Robotic Surgery  Consult Note               PATIENT NAME: Maryuri Spangler   :  1960   MRN: [id number]   PCP:  Estuardo Yates MD     TODAY'S DATE: 3/6/2025    Chief Complaint   Patient presents with    New Patient       HISTORY OF PRESENT ILLNESS: 64 y.o. female presents to discuss possible hernia/left groin pain.    Bulge: Complains of pain, no bulge to left groin- started about a month ago or more.  Skin color changes: None  Constipation/diarrhea: None  Urination problems: None  Nausea/vomiting: None  Fever/chills: None  Prior hernia repairs: None  Prior imaging done: CT done recently at [facility]- records not available at time of appointment but no hernia was noted per patient, also had hip x-ray done which was normal per patient  Most recent colonoscopy: States UTD- has had sigmoidoscopy and Cologuard in the past- normal    Major medical hx: Takotsubo cardiomyopathy, pneumonia, asthma, hypertension, GERD, diverticulitis- x2 flares in the past  Prior ABD/pelvic surgeries: D&C, pelvic laparoscopy   Blood thinning medications: None    PAST MEDICAL HISTORY     Past Medical History:   Diagnosis Date    Acute asthma exacerbation 3/29/2021    Acute diverticulitis 2021    Acute respiratory failure with hypoxia (HCC)     Asthma 2013    COVID-19 virus antibody negative 2020    negative    Essential hypertension     Former smoker     Gastroesophageal reflux disease     Hypertension 9/3/2014    Moderate persistent asthma without complication     Pneumonia of right lower lobe due to infectious organism 2021    Post-nasal drip     Takotsubo cardiomyopathy        PROBLEM LIST     Patient Active Problem List   Diagnosis    Asthma exacerbation    Hypertension    Abnormal drug screen    Simple chronic

## 2025-03-18 ENCOUNTER — TELEPHONE (OUTPATIENT)
Age: 65
End: 2025-03-18

## 2025-03-18 DIAGNOSIS — R10.32 LEFT GROIN PAIN: Primary | ICD-10-CM

## 2025-03-18 NOTE — TELEPHONE ENCOUNTER
I was told by another radiologist that for hernia the ultrasound needs to be a nonvascular extremity which was the order that I placed.  If they have another order number that I should use please have them give you the order number and let me know.  I do not see any option in epic for a groin ultrasound.

## 2025-03-18 NOTE — TELEPHONE ENCOUNTER
Daria from [facility] called in regards to patients US scheduled on 4/1/25. She states it is ordered as an extremity and it needs to be groin for the hernia rule out.   Please advise.

## 2025-03-20 NOTE — TELEPHONE ENCOUNTER
I called [facility] and was transferred to an Ultrasound Tech, the ultrasound tech said to order an US abdomen limited for the groin hernia rule out.

## 2025-03-24 ENCOUNTER — HOSPITAL ENCOUNTER (OUTPATIENT)
Dept: ULTRASOUND IMAGING | Age: 65
Discharge: HOME | End: 2025-03-26
Payer: COMMERCIAL

## 2025-03-24 DIAGNOSIS — R10.32 LEFT GROIN PAIN: ICD-10-CM

## 2025-03-24 PROCEDURE — 76857 US EXAM PELVIC LIMITED: CPT

## 2025-03-26 ENCOUNTER — RESULTS FOLLOW-UP (OUTPATIENT)
Dept: SURGERY | Age: 65
End: 2025-03-26

## 2025-05-15 ENCOUNTER — OFFICE VISIT (OUTPATIENT)
Age: 65
End: 2025-05-15
Payer: COMMERCIAL

## 2025-05-15 VITALS
HEIGHT: 64 IN | WEIGHT: 195.6 LBS | TEMPERATURE: 98.1 F | HEART RATE: 98 BPM | OXYGEN SATURATION: 95 % | SYSTOLIC BLOOD PRESSURE: 166 MMHG | BODY MASS INDEX: 33.39 KG/M2 | DIASTOLIC BLOOD PRESSURE: 100 MMHG

## 2025-05-15 DIAGNOSIS — R10.32 LEFT GROIN PAIN: ICD-10-CM

## 2025-05-15 DIAGNOSIS — K40.90 LEFT INGUINAL HERNIA: Primary | ICD-10-CM

## 2025-05-15 DIAGNOSIS — N28.1 RENAL CYST: ICD-10-CM

## 2025-05-15 DIAGNOSIS — K57.30 COLON, DIVERTICULOSIS: ICD-10-CM

## 2025-05-15 DIAGNOSIS — I71.40 ABDOMINAL AORTIC ANEURYSM (AAA) WITHOUT RUPTURE, UNSPECIFIED PART: ICD-10-CM

## 2025-05-15 PROCEDURE — 3080F DIAST BP >= 90 MM HG: CPT | Performed by: SURGERY

## 2025-05-15 PROCEDURE — 3077F SYST BP >= 140 MM HG: CPT | Performed by: SURGERY

## 2025-05-15 PROCEDURE — 99214 OFFICE O/P EST MOD 30 MIN: CPT | Performed by: SURGERY

## 2025-05-15 NOTE — PROGRESS NOTES
fluid. No peritoneal nodularity. Sigmoid diverticulosis with Associated inflammation.    Osseous structures are age compatible. No aggressive osseous lesions or fractures.    IMPRESSION:    *  Acute sigmoid diverticulitis. No free air or fluid collection at this time.    Workstation:[id number]    Finalized by Magdiel Young MD on 9/6/2023 7:03 PM        Assessment & Plan  1. Left groin pain.  The etiology of the pain could be multifactorial, potentially arising from the hernia, hip, or spinal arthritis. The presence of a small hernia may not be the sole cause of the discomfort. Given the current fungal infection in the groin area, surgical intervention is not advisable at this time. Two potential courses of action were discussed: either proceed with hernia repair once the fungal infection has resolved, with the understanding that this may not alleviate the pain, or continue to monitor the condition and consider repeat imaging if symptoms worsen.    2. Fungal infection.  The fungal infection in the groin area is being treated with Diflucan. It is advised to wait until the infection resolves before considering any surgical intervention for the hernia.    3. Aortic aneurysm.  A 2.2 cm aortic aneurysm was identified on the CT scan. Discussion with her primary care physician during the next follow-up visit is recommended.    4. Renal cyst.  A cyst on the right kidney was noted on the CT scan. Discussion with her primary care physician during the next follow-up visit is recommended.    5. Diverticulosis.  Diverticulosis was identified on the CT scan. No immediate action is required, but it should be monitored as part of her overall gastrointestinal health.    6. Arthritis.  Arthritis in the spine was noted on the CT scan. This could be contributing to her pain. No immediate action is required, but defer further management to primary care physician    7.  Follow-up with me as needed.  Happy to see the patient if her

## 2025-05-15 NOTE — PATIENT INSTRUCTIONS
Due to fungal infection to left groin currently, will monitor. Once the area is healed, come back to the office for another appointment. If pain is ongoing, will consider surgery.

## 2025-08-21 RX ORDER — FLUTICASONE PROPIONATE AND SALMETEROL 500; 50 UG/1; UG/1
POWDER RESPIRATORY (INHALATION)
Qty: 180 EACH | Refills: 0 | Status: SHIPPED | OUTPATIENT
Start: 2025-08-21